# Patient Record
Sex: FEMALE | Race: OTHER | HISPANIC OR LATINO | Employment: UNEMPLOYED | URBAN - METROPOLITAN AREA
[De-identification: names, ages, dates, MRNs, and addresses within clinical notes are randomized per-mention and may not be internally consistent; named-entity substitution may affect disease eponyms.]

---

## 2023-03-28 ENCOUNTER — HOSPITAL ENCOUNTER (EMERGENCY)
Facility: HOSPITAL | Age: 83
Discharge: HOME | End: 2023-03-28
Attending: EMERGENCY MEDICINE

## 2023-03-28 ENCOUNTER — APPOINTMENT (EMERGENCY)
Dept: RADIOLOGY | Facility: HOSPITAL | Age: 83
End: 2023-03-28

## 2023-03-28 VITALS
HEART RATE: 85 BPM | OXYGEN SATURATION: 96 % | SYSTOLIC BLOOD PRESSURE: 174 MMHG | DIASTOLIC BLOOD PRESSURE: 74 MMHG | TEMPERATURE: 98.1 F | RESPIRATION RATE: 20 BRPM

## 2023-03-28 DIAGNOSIS — R05.3 CHRONIC COUGH: Primary | ICD-10-CM

## 2023-03-28 LAB
ANION GAP SERPL CALCULATED.3IONS-SCNC: 7 MMOL/L (ref 4–13)
BASOPHILS # BLD AUTO: 0.04 THOUSANDS/ÂΜL (ref 0–0.1)
BASOPHILS NFR BLD AUTO: 0 % (ref 0–1)
BNP SERPL-MCNC: 155 PG/ML (ref 0–100)
BUN SERPL-MCNC: 16 MG/DL (ref 5–25)
CALCIUM SERPL-MCNC: 8.7 MG/DL (ref 8.4–10.2)
CARDIAC TROPONIN I PNL SERPL HS: 39 NG/L
CHLORIDE SERPL-SCNC: 103 MMOL/L (ref 96–108)
CO2 SERPL-SCNC: 26 MMOL/L (ref 21–32)
CREAT SERPL-MCNC: 0.71 MG/DL (ref 0.6–1.3)
EOSINOPHIL # BLD AUTO: 0.41 THOUSAND/ÂΜL (ref 0–0.61)
EOSINOPHIL NFR BLD AUTO: 3 % (ref 0–6)
ERYTHROCYTE [DISTWIDTH] IN BLOOD BY AUTOMATED COUNT: 14.7 % (ref 11.6–15.1)
GFR SERPL CREATININE-BSD FRML MDRD: 79 ML/MIN/1.73SQ M
GLUCOSE SERPL-MCNC: 110 MG/DL (ref 65–140)
HCT VFR BLD AUTO: 36.8 % (ref 34.8–46.1)
HGB BLD-MCNC: 12.2 G/DL (ref 11.5–15.4)
IMM GRANULOCYTES # BLD AUTO: 0.12 THOUSAND/UL (ref 0–0.2)
IMM GRANULOCYTES NFR BLD AUTO: 1 % (ref 0–2)
LYMPHOCYTES # BLD AUTO: 2.71 THOUSANDS/ÂΜL (ref 0.6–4.47)
LYMPHOCYTES NFR BLD AUTO: 22 % (ref 14–44)
MCH RBC QN AUTO: 28.8 PG (ref 26.8–34.3)
MCHC RBC AUTO-ENTMCNC: 33.2 G/DL (ref 31.4–37.4)
MCV RBC AUTO: 87 FL (ref 82–98)
MONOCYTES # BLD AUTO: 0.96 THOUSAND/ÂΜL (ref 0.17–1.22)
MONOCYTES NFR BLD AUTO: 8 % (ref 4–12)
NEUTROPHILS # BLD AUTO: 8.12 THOUSANDS/ÂΜL (ref 1.85–7.62)
NEUTS SEG NFR BLD AUTO: 66 % (ref 43–75)
NRBC BLD AUTO-RTO: 0 /100 WBCS
PLATELET # BLD AUTO: 289 THOUSANDS/UL (ref 149–390)
PMV BLD AUTO: 9.6 FL (ref 8.9–12.7)
POTASSIUM SERPL-SCNC: 4.1 MMOL/L (ref 3.5–5.3)
RBC # BLD AUTO: 4.23 MILLION/UL (ref 3.81–5.12)
SODIUM SERPL-SCNC: 136 MMOL/L (ref 135–147)
WBC # BLD AUTO: 12.36 THOUSAND/UL (ref 4.31–10.16)

## 2023-03-28 RX ORDER — AMOXICILLIN 250 MG/1
500 CAPSULE ORAL ONCE
Status: COMPLETED | OUTPATIENT
Start: 2023-03-28 | End: 2023-03-28

## 2023-03-28 RX ORDER — ALBUTEROL SULFATE 90 UG/1
2 AEROSOL, METERED RESPIRATORY (INHALATION) EVERY 6 HOURS PRN
Qty: 8.5 G | Refills: 0 | Status: SHIPPED | OUTPATIENT
Start: 2023-03-28

## 2023-03-28 RX ORDER — IPRATROPIUM BROMIDE AND ALBUTEROL SULFATE 2.5; .5 MG/3ML; MG/3ML
3 SOLUTION RESPIRATORY (INHALATION) ONCE
Status: COMPLETED | OUTPATIENT
Start: 2023-03-28 | End: 2023-03-28

## 2023-03-28 RX ORDER — AMOXICILLIN 500 MG/1
500 CAPSULE ORAL 3 TIMES DAILY
Qty: 21 CAPSULE | Refills: 0 | Status: SHIPPED | OUTPATIENT
Start: 2023-03-28 | End: 2023-04-04

## 2023-03-28 RX ORDER — PREDNISONE 50 MG/1
50 TABLET ORAL DAILY
Qty: 5 TABLET | Refills: 0 | Status: SHIPPED | OUTPATIENT
Start: 2023-03-28

## 2023-03-28 RX ADMIN — AMOXICILLIN 500 MG: 250 CAPSULE ORAL at 20:55

## 2023-03-28 RX ADMIN — IPRATROPIUM BROMIDE AND ALBUTEROL SULFATE 3 ML: 2.5; .5 SOLUTION RESPIRATORY (INHALATION) at 20:55

## 2023-03-28 RX ADMIN — PREDNISONE 50 MG: 20 TABLET ORAL at 20:55

## 2023-03-28 NOTE — ED PROVIDER NOTES
History  Chief Complaint   Patient presents with   • Cough     Pt's family member states the pt has had a cough for 2 years with yellow sputum production      This is an 45-year-old female with no significant past medical history who does not smoke or drink  She lives with family members  She is brought to the emergency department for persistent cough that has been aggravating her for more than 2 years  No recent fevers  Some chills  The cough sounds wet and sometimes she complains of sore throat  History provided by:  Relative   used: Yes    Cough  Cough characteristics:  Productive  Associated symptoms: chest pain    Associated symptoms: no chills, no ear pain, no fever, no rash, no shortness of breath and no sore throat        None       History reviewed  No pertinent past medical history  History reviewed  No pertinent surgical history  History reviewed  No pertinent family history  I have reviewed and agree with the history as documented  E-Cigarette/Vaping     E-Cigarette/Vaping Substances     Social History     Tobacco Use   • Smoking status: Never   • Smokeless tobacco: Never       Review of Systems   Constitutional: Positive for fatigue  Negative for chills and fever  HENT: Negative for ear pain and sore throat  Eyes: Negative for pain and visual disturbance  Respiratory: Positive for cough and chest tightness  Negative for shortness of breath  Cardiovascular: Positive for chest pain  Negative for palpitations  Gastrointestinal: Negative for abdominal pain and vomiting  Genitourinary: Negative for dysuria and hematuria  Musculoskeletal: Negative for arthralgias and back pain  Skin: Negative for color change and rash  Neurological: Positive for weakness  Negative for seizures and syncope  All other systems reviewed and are negative  Physical Exam  Physical Exam  Vitals and nursing note reviewed     Constitutional:       General: She is not in acute distress  Appearance: Normal appearance  She is well-developed and normal weight  HENT:      Head: Normocephalic and atraumatic  Right Ear: External ear normal       Left Ear: External ear normal       Mouth/Throat:      Mouth: Mucous membranes are moist    Eyes:      Conjunctiva/sclera: Conjunctivae normal       Pupils: Pupils are equal, round, and reactive to light  Cardiovascular:      Rate and Rhythm: Normal rate and regular rhythm  Pulses: Normal pulses  Heart sounds: Normal heart sounds  No murmur heard  Pulmonary:      Effort: Pulmonary effort is normal  No respiratory distress  Breath sounds: Wheezing (With forced expiration) and rhonchi present  Chest:      Chest wall: No tenderness  Abdominal:      General: Abdomen is flat  Palpations: Abdomen is soft  Tenderness: There is no abdominal tenderness  Musculoskeletal:         General: No swelling  Cervical back: Neck supple  Skin:     General: Skin is warm and dry  Capillary Refill: Capillary refill takes less than 2 seconds  Neurological:      General: No focal deficit present  Mental Status: She is alert and oriented to person, place, and time  Psychiatric:         Mood and Affect: Mood normal          Thought Content:  Thought content normal          Judgment: Judgment normal          Vital Signs  ED Triage Vitals [03/28/23 1827]   Temperature Pulse Respirations Blood Pressure SpO2   98 1 °F (36 7 °C) 74 17 (!) 181/77 96 %      Temp src Heart Rate Source Patient Position - Orthostatic VS BP Location FiO2 (%)   -- Monitor -- -- --      Pain Score       --           Vitals:    03/28/23 1827 03/28/23 1915 03/28/23 2000 03/28/23 2100   BP: (!) 181/77  (!) 176/74 (!) 174/74   Pulse: 74 74 73 85         Visual Acuity      ED Medications  Medications   ipratropium-albuterol (DUO-NEB) 0 5-2 5 mg/3 mL inhalation solution 3 mL (3 mL Nebulization Given 3/28/23 2055)   predniSONE tablet 50 mg (50 mg Oral Given 3/28/23 2055)   amoxicillin (AMOXIL) capsule 500 mg (500 mg Oral Given 3/28/23 2055)       Diagnostic Studies  Results Reviewed     Procedure Component Value Units Date/Time    B-Type Natriuretic Peptide(BNP) [[id number]]  (Abnormal) Collected: 03/28/23 2002    Lab Status: Final result Specimen: Blood from Arm, Right Updated: 03/28/23 2039      pg/mL     HS Troponin 0hr (reflex protocol) [[id number]]  (Normal) Collected: 03/28/23 2002    Lab Status: Final result Specimen: Blood from Arm, Right Updated: 03/28/23 2035     hs TnI 0hr 39 ng/L     HS Troponin I 2hr [[id number]]     Lab Status: No result Specimen: Blood     HS Troponin I 4hr [[id number]]     Lab Status: No result Specimen: Blood     Basic metabolic panel [[id number]] Collected: 03/28/23 2002    Lab Status: Final result Specimen: Blood from Arm, Right Updated: 03/28/23 2024     Sodium 136 mmol/L      Potassium 4 1 mmol/L      Chloride 103 mmol/L      CO2 26 mmol/L      ANION GAP 7 mmol/L      BUN 16 mg/dL      Creatinine 0 71 mg/dL      Glucose 110 mg/dL      Calcium 8 7 mg/dL      eGFR 79 ml/min/1 73sq m     Narrative:      Rodri guidelines for Chronic Kidney Disease (CKD):   •  Stage 1 with normal or high GFR (GFR > 90 mL/min/1 73 square meters)  •  Stage 2 Mild CKD (GFR = 60-89 mL/min/1 73 square meters)  •  Stage 3A Moderate CKD (GFR = 45-59 mL/min/1 73 square meters)  •  Stage 3B Moderate CKD (GFR = 30-44 mL/min/1 73 square meters)  •  Stage 4 Severe CKD (GFR = 15-29 mL/min/1 73 square meters)  •  Stage 5 End Stage CKD (GFR <15 mL/min/1 73 square meters)  Note: GFR calculation is accurate only with a steady state creatinine    CBC and differential [[id number]]  (Abnormal) Collected: 03/28/23 2002    Lab Status: Final result Specimen: Blood from Arm, Right Updated: 03/28/23 2008     WBC 12 36 Thousand/uL      RBC 4 23 Million/uL      Hemoglobin 12 2 g/dL      Hematocrit 36 8 %      MCV 87 fL      MCH 28 8 pg MCHC 33 2 g/dL      RDW 14 7 %      MPV 9 6 fL      Platelets 786 Thousands/uL      nRBC 0 /100 WBCs      Neutrophils Relative 66 %      Immat GRANS % 1 %      Lymphocytes Relative 22 %      Monocytes Relative 8 %      Eosinophils Relative 3 %      Basophils Relative 0 %      Neutrophils Absolute 8 12 Thousands/µL      Immature Grans Absolute 0 12 Thousand/uL      Lymphocytes Absolute 2 71 Thousands/µL      Monocytes Absolute 0 96 Thousand/µL      Eosinophils Absolute 0 41 Thousand/µL      Basophils Absolute 0 04 Thousands/µL                  XR chest 2 views    (Results Pending)              Procedures  Procedures         ED Course  ED Course as of 03/28/23 2114   Tue Mar 28, 2023   2028 Sodium: 136   2028 Potassium: 4 1   2028 BUN: 16   2028 Creatinine: 0 71   2052 BNP(!): 155   2052 hs TnI 0hr: 44                                             Medical Decision Making  Differential diagnosis includes but not limited to: Bronchitis, chronic bronchitis, pneumonia, pleural effusion, congestive heart failure, pulmonary fibrosis, COPD    EKG shows normal sinus rhythm, left axis deviation  Rate of 84  Amount and/or Complexity of Data Reviewed  Labs: ordered  Decision-making details documented in ED Course  Radiology: ordered  Discussion of management or test interpretation with external provider(s): Patient is having no chest pain at this time  Risk  Prescription drug management  Disposition  Final diagnoses:   Chronic cough     Time reflects when diagnosis was documented in both MDM as applicable and the Disposition within this note     Time User Action Codes Description Comment    3/28/2023  9:06 PM Esther Hickey Add [R05 3] Chronic cough       ED Disposition     ED Disposition   Discharge    Condition   Stable    Date/Time   Tue Mar 28, 2023  9:06 PM    [address] discharge to home/self care                 Follow-up Information     Follow up With Specialties Details Why Contact Paty    Maria De Jesus Buck, [address] [facility] In 1 week  [address] [facility]  [phone]            Patient's Medications   Discharge Prescriptions    ALBUTEROL (PROAIR HFA) 90 MCG/ACT INHALER    Inhale 2 puffs every 6 (six) hours as needed for wheezing       Start Date: 3/28/2023 End Date: --       Order Dose: 2 puffs       Quantity: 8 5 g    Refills: 0    AMOXICILLIN (AMOXIL) 500 MG CAPSULE    Take 1 capsule (500 mg total) by mouth 3 (three) times a day for 7 days       Start Date: 3/28/2023 End Date: 4/4/2023       Order Dose: 500 mg       Quantity: 21 capsule    Refills: 0    PREDNISONE 50 MG TABLET    Take 1 tablet (50 mg total) by mouth daily       Start Date: 3/28/2023 End Date: --       Order Dose: 50 mg       Quantity: 5 tablet    Refills: 0       No discharge procedures on file      PDMP Review     None          ED Provider  Electronically Signed by           Jaida Aguilar MD  03/28/23 8528

## 2023-03-29 ENCOUNTER — OFFICE VISIT (OUTPATIENT)
Dept: FAMILY MEDICINE CLINIC | Facility: CLINIC | Age: 83
End: 2023-03-29

## 2023-03-29 VITALS
DIASTOLIC BLOOD PRESSURE: 78 MMHG | HEIGHT: 64 IN | SYSTOLIC BLOOD PRESSURE: 126 MMHG | HEART RATE: 95 BPM | OXYGEN SATURATION: 97 % | TEMPERATURE: 98.8 F

## 2023-03-29 DIAGNOSIS — Z13.6 SCREENING FOR CARDIOVASCULAR CONDITION: ICD-10-CM

## 2023-03-29 DIAGNOSIS — R05.2 SUBACUTE COUGH: Primary | ICD-10-CM

## 2023-03-29 DIAGNOSIS — J18.9 PNEUMONIA OF LEFT LUNG DUE TO INFECTIOUS ORGANISM, UNSPECIFIED PART OF LUNG: ICD-10-CM

## 2023-03-29 LAB
ATRIAL RATE: 84 BPM
P AXIS: -1 DEGREES
PR INTERVAL: 152 MS
QRS AXIS: -43 DEGREES
QRSD INTERVAL: 86 MS
QT INTERVAL: 358 MS
QTC INTERVAL: 423 MS
T WAVE AXIS: 33 DEGREES
VENTRICULAR RATE: 84 BPM

## 2023-03-29 RX ORDER — DEXTROMETHORPHAN HYDROBROMIDE AND PROMETHAZINE HYDROCHLORIDE 15; 6.25 MG/5ML; MG/5ML
5 SOLUTION ORAL 4 TIMES DAILY PRN
Qty: 240 ML | Refills: 0 | Status: SHIPPED | OUTPATIENT
Start: 2023-03-29

## 2023-03-29 RX ORDER — AZITHROMYCIN 250 MG/1
TABLET, FILM COATED ORAL
Qty: 6 TABLET | Refills: 0 | Status: SHIPPED | OUTPATIENT
Start: 2023-03-29 | End: 2023-04-03

## 2023-03-29 RX ORDER — BENZONATATE 200 MG/1
200 CAPSULE ORAL 3 TIMES DAILY PRN
Qty: 20 CAPSULE | Refills: 0 | Status: SHIPPED | OUTPATIENT
Start: 2023-03-29

## 2023-03-29 NOTE — PROGRESS NOTES
Assessment/Plan:         Problem List Items Addressed This Visit    None  Visit Diagnoses     Subacute cough    -  Primary    Will add mucinex, promethazine dm and tessalon pearls, also suggest saline nasal spray  Repeat xray in 1 month  Relevant Medications    Promethazine-DM (PHENERGAN-DM) 6 25-15 mg/5 mL oral syrup    benzonatate (TESSALON) 200 MG capsule    Pneumonia of left lung due to infectious organism, unspecified part of lung        Will add zpak, demonestrated use of inhaler, take all prescribed antibiotics, will return to [facility] in 2 weeks, follow up if not better  Relevant Medications    Promethazine-DM (PHENERGAN-DM) 6 25-15 mg/5 mL oral syrup    benzonatate (TESSALON) 200 MG capsule    azithromycin (Zithromax) 250 mg tablet    Screening for cardiovascular condition        Please have lab work and follow up in 1 month  Relevant Orders    HEMOGLOBIN A1C W/ EAG ESTIMATION    Lipid panel            Subjective:      Patient ID: Yohannes Clark is a 80 y o  female  Laura Kyle is here to establish, she was seen in the ER for pneumonia  She only took 1 dose of the antibiotics  She is from [facility] and visits her son here every 3-4 months  She is on no medicine in [facility]  The following portions of the patient's history were reviewed and updated as appropriate:   Past Medical History:  She has no past medical history on file ,  _______________________________________________________________________  Medical Problems:  does not have a problem list on file ,  _______________________________________________________________________  Past Surgical History:   has no past surgical history on file ,  _______________________________________________________________________  Family History:  family history is not on file ,  _______________________________________________________________________  Social History:   reports that she has never smoked  She has never been exposed to tobacco smoke  She has never used smokeless tobacco  She reports that she does not currently use alcohol  She reports that she does not use drugs  ,  _______________________________________________________________________  Allergies:  has No Known Allergies     _______________________________________________________________________  Current Outpatient Medications   Medication Sig Dispense Refill   • azithromycin (Zithromax) 250 mg tablet Take 2 tablets (500 mg total) by mouth daily for 1 day, THEN 1 tablet (250 mg total) daily for 4 days  6 tablet 0   • benzonatate (TESSALON) 200 MG capsule Take 1 capsule (200 mg total) by mouth 3 (three) times a day as needed for cough 20 capsule 0   • Promethazine-DM (PHENERGAN-DM) 6 25-15 mg/5 mL oral syrup Take 5 mL by mouth 4 (four) times a day as needed for cough 240 mL 0   • albuterol (ProAir HFA) 90 mcg/act inhaler Inhale 2 puffs every 6 (six) hours as needed for wheezing 8 5 g 0   • amoxicillin (AMOXIL) 500 mg capsule Take 1 capsule (500 mg total) by mouth 3 (three) times a day for 7 days 21 capsule 0   • predniSONE 50 mg tablet Take 1 tablet (50 mg total) by mouth daily 5 tablet 0     No current facility-administered medications for this visit      _______________________________________________________________________  Review of Systems   Constitutional: Positive for fatigue  Negative for chills, diaphoresis and fever  HENT: Positive for congestion  Negative for ear pain, rhinorrhea, sinus pressure, sinus pain and sore throat  Eyes: Negative for pain and visual disturbance  Respiratory: Positive for cough, chest tightness and wheezing  Negative for shortness of breath  Cardiovascular: Positive for chest pain  Negative for palpitations  Gastrointestinal: Negative for abdominal pain, constipation, diarrhea, nausea and vomiting  Genitourinary: Negative for dysuria, frequency, hematuria and urgency  Musculoskeletal: Negative for arthralgias, back pain and myalgias     Skin: "Negative for color change and rash  Neurological: Positive for dizziness  Negative for seizures, syncope and headaches  Psychiatric/Behavioral: Positive for sleep disturbance  All other systems reviewed and are negative  Objective:  Vitals:    03/29/23 1407   BP: 126/78   Pulse: 95   Temp: 98 8 °F (37 1 °C)   SpO2: 97%   Height: 5' 4\" (1 626 m)     There is no height or weight on file to calculate BMI  Physical Exam  Vitals and nursing note reviewed  Constitutional:       Appearance: Normal appearance  She is not ill-appearing  HENT:      Head: Normocephalic  Right Ear: Tympanic membrane, ear canal and external ear normal  There is no impacted cerumen  Left Ear: Tympanic membrane, ear canal and external ear normal  There is no impacted cerumen  Nose: Congestion present  Mouth/Throat:      Mouth: Mucous membranes are moist       Pharynx: No posterior oropharyngeal erythema  Eyes:      Extraocular Movements: Extraocular movements intact  Conjunctiva/sclera: Conjunctivae normal       Pupils: Pupils are equal, round, and reactive to light  Cardiovascular:      Rate and Rhythm: Normal rate and regular rhythm  Heart sounds: No murmur heard  Pulmonary:      Effort: Pulmonary effort is normal       Breath sounds: Wheezing and rales present  Abdominal:      General: Bowel sounds are normal       Palpations: Abdomen is soft  Tenderness: There is no abdominal tenderness  Musculoskeletal:         General: Normal range of motion  Cervical back: Normal range of motion  Right lower leg: No edema  Left lower leg: No edema  Skin:     General: Skin is warm and dry  Neurological:      General: No focal deficit present  Mental Status: She is alert     Psychiatric:         Mood and Affect: Mood normal          Behavior: Behavior normal          "

## 2023-03-29 NOTE — DISCHARGE INSTRUCTIONS
A  personal message from Dr Sushma Prasad,  Thank you so much for allowing me to care for you today  I pride myself in the care and attention I give all my patients  I hope you were a witness to this tonight  If for any reason your condition does not improve, worsens, or you have a question that was not answered during your visit you can feel free to text me on my personal phone   # [phone]  I will answer to your message and continue your care past your emergency room visit

## 2023-04-11 LAB — HBA1C MFR BLD HPLC: 6.1 %

## 2023-04-17 DIAGNOSIS — R05.2 SUBACUTE COUGH: ICD-10-CM

## 2023-05-05 ENCOUNTER — OFFICE VISIT (OUTPATIENT)
Dept: FAMILY MEDICINE CLINIC | Facility: CLINIC | Age: 83
End: 2023-05-05

## 2023-05-05 VITALS
HEIGHT: 64 IN | HEART RATE: 71 BPM | OXYGEN SATURATION: 97 % | DIASTOLIC BLOOD PRESSURE: 84 MMHG | SYSTOLIC BLOOD PRESSURE: 134 MMHG | TEMPERATURE: 97.8 F

## 2023-05-05 DIAGNOSIS — Z13.6 SCREENING FOR CARDIOVASCULAR CONDITION: Primary | ICD-10-CM

## 2023-05-05 DIAGNOSIS — E28.39 OVARIAN FAILURE DUE TO MENOPAUSE: ICD-10-CM

## 2023-05-05 DIAGNOSIS — M25.561 CHRONIC PAIN OF BOTH KNEES: ICD-10-CM

## 2023-05-05 DIAGNOSIS — M25.562 CHRONIC PAIN OF BOTH KNEES: ICD-10-CM

## 2023-05-05 DIAGNOSIS — G89.29 CHRONIC PAIN OF BOTH KNEES: ICD-10-CM

## 2023-05-05 RX ORDER — MELOXICAM 15 MG/1
15 TABLET ORAL DAILY
Qty: 90 TABLET | Refills: 1 | Status: SHIPPED | OUTPATIENT
Start: 2023-05-05

## 2023-05-05 NOTE — PROGRESS NOTES
Assessment/Plan:         Problem List Items Addressed This Visit        Other    Chronic pain of both knees     Given instructions to start meloxicam once daily and tylenol arthritis twice daily and glucosamine chondroitin and tumeric  Follow up in 3 months when returned from [facility]  Relevant Medications    meloxicam (Mobic) 15 mg tablet   Other Visit Diagnoses     Screening for cardiovascular condition    -  Primary    lab work is ordered, if when she returns she still has no insurance will do in office A1C    Relevant Orders    Lipid panel    Hemoglobin A1C    TSH, 3rd generation with Free T4 reflex    CBC and Platelet    Comprehensive metabolic panel    Ovarian failure due to menopause        Dexa ordered, will wait to have until insurance is obtained  Relevant Orders    DXA bone density spine hip and pelvis            Subjective:      Patient ID: Guillermo Alex is a 80 y o  female  Sophia Mercury is here for bilateral knee pain, her breathing is improved  She will be going to [facility] on May 5/15/23 for 2 months and will return after  She still does not have insurance  The following portions of the patient's history were reviewed and updated as appropriate:   Past Medical History:  She has no past medical history on file ,  _______________________________________________________________________  Medical Problems:  does not have any pertinent problems on file ,  _______________________________________________________________________  Past Surgical History:   has no past surgical history on file ,  _______________________________________________________________________  Family History:  family history is not on file ,  _______________________________________________________________________  Social History:   reports that she has never smoked  She has never been exposed to tobacco smoke  She has never used smokeless tobacco  She reports that she does not currently use alcohol   She reports that she does not use drugs  ,  _______________________________________________________________________  Allergies:  has No Known Allergies     _______________________________________________________________________  Current Outpatient Medications   Medication Sig Dispense Refill    albuterol (ProAir HFA) 90 mcg/act inhaler Inhale 2 puffs every 6 (six) hours as needed for wheezing 8 5 g 0    benzonatate (TESSALON) 200 MG capsule Take 1 capsule (200 mg total) by mouth 3 (three) times a day as needed for cough 20 capsule 0    meloxicam (Mobic) 15 mg tablet Take 1 tablet (15 mg total) by mouth daily 90 tablet 1    predniSONE 50 mg tablet Take 1 tablet (50 mg total) by mouth daily 5 tablet 0    Promethazine-DM (PHENERGAN-DM) 6 25-15 mg/5 mL oral syrup Take 5 mL by mouth 4 (four) times a day as needed for cough 240 mL 0     No current facility-administered medications for this visit      _______________________________________________________________________  Review of Systems   Constitutional: Negative for chills, fatigue and fever  HENT: Negative for congestion, ear pain, rhinorrhea, sinus pressure, sinus pain and sore throat  Eyes: Negative for pain and visual disturbance  Respiratory: Negative for cough, chest tightness and shortness of breath  Cardiovascular: Negative for chest pain and palpitations  Gastrointestinal: Negative for abdominal pain, constipation, diarrhea, nausea and vomiting  Genitourinary: Negative for dysuria and hematuria  Musculoskeletal: Positive for arthralgias  Negative for back pain  Skin: Negative for color change and rash  Neurological: Negative for dizziness, seizures, syncope, light-headedness and headaches  Psychiatric/Behavioral: Negative for dysphoric mood  The patient is not nervous/anxious  All other systems reviewed and are negative          Objective:  Vitals:    05/05/23 0928   BP: 134/84   BP Location: Left arm   Patient Position: Sitting   Cuff Size: "Standard   Pulse: 71   Temp: 97 8 °F (36 6 °C)   TempSrc: Tympanic   SpO2: 97%   Height: 5' 4\" (1 626 m)     There is no height or weight on file to calculate BMI  Physical Exam  Vitals and nursing note reviewed  Constitutional:       General: She is not in acute distress  Appearance: Normal appearance  She is not ill-appearing  HENT:      Head: Normocephalic  Right Ear: External ear normal  There is impacted cerumen  Left Ear: Tympanic membrane, ear canal and external ear normal  There is no impacted cerumen  Nose: Nose normal  No congestion  Mouth/Throat:      Mouth: Mucous membranes are moist       Pharynx: No posterior oropharyngeal erythema  Eyes:      Extraocular Movements: Extraocular movements intact  Conjunctiva/sclera: Conjunctivae normal       Pupils: Pupils are equal, round, and reactive to light  Cardiovascular:      Rate and Rhythm: Normal rate and regular rhythm  Heart sounds: Normal heart sounds  No murmur heard  Pulmonary:      Effort: Pulmonary effort is normal       Breath sounds: Rhonchi present  No wheezing  Abdominal:      General: Bowel sounds are normal       Palpations: Abdomen is soft  Tenderness: There is no abdominal tenderness  Musculoskeletal:         General: Normal range of motion  Cervical back: Normal range of motion  Right lower leg: No edema  Left lower leg: No edema  Skin:     General: Skin is warm and dry  Neurological:      General: No focal deficit present  Mental Status: She is alert     Psychiatric:         Mood and Affect: Mood normal          Behavior: Behavior normal          "

## 2023-05-05 NOTE — ASSESSMENT & PLAN NOTE
Given instructions to start meloxicam once daily and tylenol arthritis twice daily and glucosamine chondroitin and tumeric  Follow up in 3 months when returned from [facility]

## 2023-12-28 ENCOUNTER — OFFICE VISIT (OUTPATIENT)
Dept: FAMILY MEDICINE CLINIC | Facility: CLINIC | Age: 83
End: 2023-12-28

## 2023-12-28 VITALS — HEIGHT: 64 IN | HEART RATE: 63 BPM | TEMPERATURE: 97.2 F | OXYGEN SATURATION: 97 %

## 2023-12-28 DIAGNOSIS — J06.9 UPPER RESPIRATORY TRACT INFECTION, UNSPECIFIED TYPE: Primary | ICD-10-CM

## 2023-12-28 LAB
SARS-COV-2 AG UPPER RESP QL IA: NEGATIVE
VALID CONTROL: NORMAL

## 2023-12-28 PROCEDURE — 99214 OFFICE O/P EST MOD 30 MIN: CPT

## 2023-12-28 PROCEDURE — 87811 SARS-COV-2 COVID19 W/OPTIC: CPT

## 2023-12-28 RX ORDER — FLUTICASONE PROPIONATE 50 MCG
1 SPRAY, SUSPENSION (ML) NASAL DAILY
Qty: 15.8 ML | Refills: 1 | Status: SHIPPED | OUTPATIENT
Start: 2023-12-28

## 2023-12-28 RX ORDER — MELOXICAM 15 MG/1
15 TABLET ORAL DAILY
Qty: 60 TABLET | Refills: 0 | Status: SHIPPED | OUTPATIENT
Start: 2023-12-28

## 2023-12-28 RX ORDER — PREDNISONE 10 MG/1
TABLET ORAL DAILY
Qty: 30 TABLET | Refills: 0 | Status: SHIPPED | OUTPATIENT
Start: 2023-12-28 | End: 2024-01-06

## 2023-12-28 RX ORDER — DEXTROMETHORPHAN HYDROBROMIDE AND PROMETHAZINE HYDROCHLORIDE 15; 6.25 MG/5ML; MG/5ML
5 SYRUP ORAL 4 TIMES DAILY PRN
Qty: 473 ML | Refills: 1 | Status: SHIPPED | OUTPATIENT
Start: 2023-12-28

## 2023-12-28 RX ORDER — AMOXICILLIN AND CLAVULANATE POTASSIUM 875; 125 MG/1; MG/1
1 TABLET, FILM COATED ORAL EVERY 12 HOURS SCHEDULED
Qty: 20 TABLET | Refills: 0 | Status: SHIPPED | OUTPATIENT
Start: 2023-12-28 | End: 2024-01-07

## 2023-12-28 RX ORDER — ALBUTEROL SULFATE 90 UG/1
2 AEROSOL, METERED RESPIRATORY (INHALATION) EVERY 6 HOURS PRN
Qty: 18 G | Refills: 5 | Status: SHIPPED | OUTPATIENT
Start: 2023-12-28

## 2023-12-28 NOTE — PATIENT INSTRUCTIONS
Problem List Items Addressed This Visit    None  Visit Diagnoses       Upper respiratory tract infection, unspecified type    -  Primary    Will treat with augmentin and steroid burst, promethazine as needed. flonase and inhaler.    Relevant Medications    amoxicillin-clavulanate (AUGMENTIN) 875-125 mg per tablet    predniSONE 10 mg tablet    fluticasone (FLONASE) 50 mcg/act nasal spray    promethazine-dextromethorphan (PHENERGAN-DM) 6.25-15 mg/5 mL oral syrup

## 2023-12-28 NOTE — PROGRESS NOTES
Assessment/Plan:         Problem List Items Addressed This Visit    None  Visit Diagnoses     Upper respiratory tract infection, unspecified type    -  Primary    Will treat with augmentin and steroid burst, promethazine as needed. flonase and inhaler.    Relevant Medications    amoxicillin-clavulanate (AUGMENTIN) 875-125 mg per tablet    predniSONE 10 mg tablet    fluticasone (FLONASE) 50 mcg/act nasal spray    promethazine-dextromethorphan (PHENERGAN-DM) 6.25-15 mg/5 mL oral syrup    albuterol (Ventolin HFA) 90 mcg/act inhaler    meloxicam (Mobic) 15 mg tablet    Other Relevant Orders    POCT Rapid Covid Ag (Completed)              Subjective:      Patient ID: Bart Jasso is a 83 y.o. female.    Bart is here for a cough for 8 days. Bart came to the [place] from [facility] on the 19th of December.     Cough  Associated symptoms include chest pain, chills, headaches, shortness of breath and wheezing. Pertinent negatives include no ear pain, fever, postnasal drip, rash, rhinorrhea or sore throat.       The following portions of the patient's history were reviewed and updated as appropriate:   Past Medical History:  She has no past medical history on file.,  _______________________________________________________________________  Medical Problems:  does not have any pertinent problems on file.,  _______________________________________________________________________  Past Surgical History:   has no past surgical history on file.,  _______________________________________________________________________  Family History:  family history is not on file.,  _______________________________________________________________________  Social History:   reports that she has never smoked. She has never been exposed to tobacco smoke. She has never used smokeless tobacco. She reports that she does not currently use alcohol. She reports that she does not use  drugs.,  _______________________________________________________________________  Allergies:  has No Known Allergies..  _______________________________________________________________________  Current Outpatient Medications   Medication Sig Dispense Refill   • albuterol (Ventolin HFA) 90 mcg/act inhaler Inhale 2 puffs every 6 (six) hours as needed for wheezing 18 g 5   • amoxicillin-clavulanate (AUGMENTIN) 875-125 mg per tablet Take 1 tablet by mouth every 12 (twelve) hours for 10 days 20 tablet 0   • fluticasone (FLONASE) 50 mcg/act nasal spray 1 spray into each nostril daily 15.8 mL 1   • meloxicam (Mobic) 15 mg tablet Take 1 tablet (15 mg total) by mouth daily 60 tablet 0   • predniSONE 10 mg tablet Take 5 tablets (50 mg total) by mouth daily for 2 days, THEN 4 tablets (40 mg total) daily for 2 days, THEN 3 tablets (30 mg total) daily for 2 days, THEN 2 tablets (20 mg total) daily for 2 days, THEN 1 tablet (10 mg total) daily for 2 days. 30 tablet 0   • promethazine-dextromethorphan (PHENERGAN-DM) 6.25-15 mg/5 mL oral syrup Take 5 mL by mouth 4 (four) times a day as needed for cough 473 mL 1     No current facility-administered medications for this visit.     _______________________________________________________________________  Review of Systems   Constitutional:  Positive for chills and fatigue. Negative for fever.   HENT:  Negative for congestion, ear pain, postnasal drip, rhinorrhea, sinus pressure, sinus pain and sore throat.    Eyes:  Negative for pain and visual disturbance.   Respiratory:  Positive for cough, chest tightness, shortness of breath and wheezing.    Cardiovascular:  Positive for chest pain. Negative for palpitations.   Gastrointestinal:  Negative for abdominal pain, constipation, diarrhea, nausea and vomiting.   Genitourinary:  Negative for dysuria, frequency, hematuria and urgency.   Musculoskeletal:  Positive for arthralgias and back pain.   Skin:  Negative for color change and rash.  "  Neurological:  Positive for headaches. Negative for seizures and syncope.   All other systems reviewed and are negative.        Objective:  Vitals:    12/28/23 1121   Pulse: 63   Temp: (!) 97.2 °F (36.2 °C)   SpO2: 97%   Height: 5' 4\" (1.626 m)     There is no height or weight on file to calculate BMI.     Physical Exam  Vitals and nursing note reviewed.   Constitutional:       Appearance: Normal appearance. She is not ill-appearing.   HENT:      Head: Normocephalic.      Right Ear: Tympanic membrane, ear canal and external ear normal. There is no impacted cerumen.      Left Ear: Tympanic membrane, ear canal and external ear normal. There is no impacted cerumen.      Nose: Nose normal. No congestion.      Mouth/Throat:      Mouth: Mucous membranes are moist.      Pharynx: No posterior oropharyngeal erythema.   Eyes:      Extraocular Movements: Extraocular movements intact.      Conjunctiva/sclera: Conjunctivae normal.      Pupils: Pupils are equal, round, and reactive to light.   Cardiovascular:      Rate and Rhythm: Normal rate and regular rhythm.      Heart sounds: Normal heart sounds. No murmur heard.  Pulmonary:      Effort: Pulmonary effort is normal.      Breath sounds: Wheezing present.   Abdominal:      Palpations: Abdomen is soft.      Tenderness: There is no abdominal tenderness.   Musculoskeletal:         General: Normal range of motion.      Cervical back: Normal range of motion.      Right lower leg: No edema.      Left lower leg: No edema.   Skin:     General: Skin is warm and dry.   Neurological:      General: No focal deficit present.      Mental Status: She is alert.   Psychiatric:         Mood and Affect: Mood normal.         Behavior: Behavior normal.         "

## 2025-01-07 ENCOUNTER — APPOINTMENT (EMERGENCY)
Dept: RADIOLOGY | Facility: HOSPITAL | Age: 85
DRG: 660 | End: 2025-01-07
Payer: COMMERCIAL

## 2025-01-07 ENCOUNTER — HOSPITAL ENCOUNTER (INPATIENT)
Facility: HOSPITAL | Age: 85
LOS: 8 days | Discharge: HOME | DRG: 660 | End: 2025-01-16
Attending: EMERGENCY MEDICINE | Admitting: STUDENT IN AN ORGANIZED HEALTH CARE EDUCATION/TRAINING PROGRAM
Payer: COMMERCIAL

## 2025-01-07 ENCOUNTER — OFFICE VISIT (OUTPATIENT)
Dept: FAMILY MEDICINE CLINIC | Facility: CLINIC | Age: 85
End: 2025-01-07

## 2025-01-07 ENCOUNTER — APPOINTMENT (EMERGENCY)
Dept: CT IMAGING | Facility: HOSPITAL | Age: 85
DRG: 660 | End: 2025-01-07
Payer: COMMERCIAL

## 2025-01-07 VITALS
BODY MASS INDEX: 22.81 KG/M2 | HEART RATE: 73 BPM | WEIGHT: 133.6 LBS | HEIGHT: 64 IN | SYSTOLIC BLOOD PRESSURE: 120 MMHG | DIASTOLIC BLOOD PRESSURE: 70 MMHG | OXYGEN SATURATION: 99 %

## 2025-01-07 DIAGNOSIS — R09.89 BILATERAL RALES: ICD-10-CM

## 2025-01-07 DIAGNOSIS — I49.9 CARDIAC ARRHYTHMIA, UNSPECIFIED CARDIAC ARRHYTHMIA TYPE: Primary | ICD-10-CM

## 2025-01-07 DIAGNOSIS — R13.10 ODYNOPHAGIA: ICD-10-CM

## 2025-01-07 DIAGNOSIS — I48.0 PAF (PAROXYSMAL ATRIAL FIBRILLATION) (HCC): ICD-10-CM

## 2025-01-07 DIAGNOSIS — R79.89 ELEVATED TROPONIN: ICD-10-CM

## 2025-01-07 DIAGNOSIS — I48.20 CHRONIC ATRIAL FIBRILLATION (HCC): ICD-10-CM

## 2025-01-07 DIAGNOSIS — K13.79 MOUTH SORES: ICD-10-CM

## 2025-01-07 DIAGNOSIS — I48.0 PAROXYSMAL ATRIAL FIBRILLATION (HCC): ICD-10-CM

## 2025-01-07 DIAGNOSIS — D69.6 THROMBOCYTOPENIA (HCC): ICD-10-CM

## 2025-01-07 DIAGNOSIS — K13.79 MOUTH PAIN: ICD-10-CM

## 2025-01-07 DIAGNOSIS — K13.79 MOUTH SORES: Primary | ICD-10-CM

## 2025-01-07 DIAGNOSIS — D61.818 PANCYTOPENIA (HCC): ICD-10-CM

## 2025-01-07 LAB
2HR DELTA HS TROPONIN: 16 NG/L
4HR DELTA HS TROPONIN: 54 NG/L
ALBUMIN SERPL BCG-MCNC: 3.7 G/DL (ref 3.5–5)
ALP SERPL-CCNC: 68 U/L (ref 34–104)
ALT SERPL W P-5'-P-CCNC: 14 U/L (ref 7–52)
ANION GAP SERPL CALCULATED.3IONS-SCNC: 7 MMOL/L (ref 4–13)
APTT PPP: 29 SECONDS (ref 23–34)
AST SERPL W P-5'-P-CCNC: 12 U/L (ref 13–39)
ATRIAL RATE: 74 BPM
BACTERIA UR QL AUTO: ABNORMAL /HPF
BASOPHILS # BLD AUTO: 0 THOUSANDS/ΜL (ref 0–0.1)
BASOPHILS NFR BLD AUTO: 0 % (ref 0–1)
BILIRUB SERPL-MCNC: 0.89 MG/DL (ref 0.2–1)
BILIRUB UR QL STRIP: NEGATIVE
BNP SERPL-MCNC: 372 PG/ML (ref 0–100)
BUN SERPL-MCNC: 26 MG/DL (ref 5–25)
CALCIUM SERPL-MCNC: 8.7 MG/DL (ref 8.4–10.2)
CARDIAC TROPONIN I PNL SERPL HS: 120 NG/L (ref ?–50)
CARDIAC TROPONIN I PNL SERPL HS: 136 NG/L (ref ?–50)
CARDIAC TROPONIN I PNL SERPL HS: 174 NG/L (ref ?–50)
CHLORIDE SERPL-SCNC: 107 MMOL/L (ref 96–108)
CLARITY UR: CLEAR
CO2 SERPL-SCNC: 26 MMOL/L (ref 21–32)
COLOR UR: YELLOW
CREAT SERPL-MCNC: 1.14 MG/DL (ref 0.6–1.3)
EOSINOPHIL # BLD AUTO: 0.03 THOUSAND/ΜL (ref 0–0.61)
EOSINOPHIL NFR BLD AUTO: 1 % (ref 0–6)
ERYTHROCYTE [DISTWIDTH] IN BLOOD BY AUTOMATED COUNT: 13.8 % (ref 11.6–15.1)
GFR SERPL CREATININE-BSD FRML MDRD: 44 ML/MIN/1.73SQ M
GLUCOSE SERPL-MCNC: 114 MG/DL (ref 65–140)
GLUCOSE UR STRIP-MCNC: NEGATIVE MG/DL
HCT VFR BLD AUTO: 35.1 % (ref 34.8–46.1)
HGB BLD-MCNC: 11.4 G/DL (ref 11.5–15.4)
HGB UR QL STRIP.AUTO: ABNORMAL
IMM GRANULOCYTES # BLD AUTO: 0.02 THOUSAND/UL (ref 0–0.2)
IMM GRANULOCYTES NFR BLD AUTO: 1 % (ref 0–2)
INR PPP: 1.13 (ref 0.85–1.19)
KETONES UR STRIP-MCNC: ABNORMAL MG/DL
LACTATE SERPL-SCNC: 1.1 MMOL/L (ref 0.5–2)
LDH SERPL-CCNC: 128 U/L (ref 140–271)
LEUKOCYTE ESTERASE UR QL STRIP: NEGATIVE
LG PLATELETS BLD QL SMEAR: PRESENT
LYMPHOCYTES # BLD AUTO: 1.74 THOUSANDS/ΜL (ref 0.6–4.47)
LYMPHOCYTES NFR BLD AUTO: 54 % (ref 14–44)
MCH RBC QN AUTO: 28.4 PG (ref 26.8–34.3)
MCHC RBC AUTO-ENTMCNC: 32.5 G/DL (ref 31.4–37.4)
MCV RBC AUTO: 88 FL (ref 82–98)
MONOCYTES # BLD AUTO: 0.02 THOUSAND/ΜL (ref 0.17–1.22)
MONOCYTES NFR BLD AUTO: 1 % (ref 4–12)
MUCOUS THREADS UR QL AUTO: ABNORMAL
NEUTROPHILS # BLD AUTO: 1.38 THOUSANDS/ΜL (ref 1.85–7.62)
NEUTS SEG NFR BLD AUTO: 43 % (ref 43–75)
NITRITE UR QL STRIP: NEGATIVE
NON-SQ EPI CELLS URNS QL MICRO: ABNORMAL /HPF
NRBC BLD AUTO-RTO: 0 /100 WBCS
P AXIS: 61 DEGREES
PATHOLOGY REVIEW: NO
PH UR STRIP.AUTO: 6 [PH]
PLATELET # BLD AUTO: 33 THOUSANDS/UL (ref 149–390)
PLATELET BLD QL SMEAR: ABNORMAL
PMV BLD AUTO: 11.1 FL (ref 8.9–12.7)
POTASSIUM SERPL-SCNC: 4.4 MMOL/L (ref 3.5–5.3)
PR INTERVAL: 166 MS
PROCALCITONIN SERPL-MCNC: <0.05 NG/ML
PROT SERPL-MCNC: 6.1 G/DL (ref 6.4–8.4)
PROT UR STRIP-MCNC: ABNORMAL MG/DL
PROTHROMBIN TIME: 15.2 SECONDS (ref 12.3–15)
QRS AXIS: -39 DEGREES
QRSD INTERVAL: 80 MS
QT INTERVAL: 344 MS
QTC INTERVAL: 381 MS
RBC # BLD AUTO: 4.01 MILLION/UL (ref 3.81–5.12)
RBC #/AREA URNS AUTO: ABNORMAL /HPF
RBC MORPH BLD: NORMAL
RETICS # AUTO: ABNORMAL 10*3/UL (ref 14097–95744)
RETICS # CALC: 0.35 % (ref 0.37–1.87)
SODIUM SERPL-SCNC: 140 MMOL/L (ref 135–147)
SP GR UR STRIP.AUTO: >=1.05 (ref 1–1.03)
T WAVE AXIS: 61 DEGREES
UROBILINOGEN UR STRIP-ACNC: <2 MG/DL
VENTRICULAR RATE: 74 BPM
WBC # BLD AUTO: 3.19 THOUSAND/UL (ref 4.31–10.16)
WBC #/AREA URNS AUTO: ABNORMAL /HPF

## 2025-01-07 PROCEDURE — 99285 EMERGENCY DEPT VISIT HI MDM: CPT | Performed by: EMERGENCY MEDICINE

## 2025-01-07 PROCEDURE — 99284 EMERGENCY DEPT VISIT MOD MDM: CPT

## 2025-01-07 PROCEDURE — 84145 PROCALCITONIN (PCT): CPT | Performed by: EMERGENCY MEDICINE

## 2025-01-07 PROCEDURE — 84484 ASSAY OF TROPONIN QUANT: CPT | Performed by: EMERGENCY MEDICINE

## 2025-01-07 PROCEDURE — 93005 ELECTROCARDIOGRAM TRACING: CPT

## 2025-01-07 PROCEDURE — 85610 PROTHROMBIN TIME: CPT | Performed by: EMERGENCY MEDICINE

## 2025-01-07 PROCEDURE — 84484 ASSAY OF TROPONIN QUANT: CPT | Performed by: INTERNAL MEDICINE

## 2025-01-07 PROCEDURE — 83880 ASSAY OF NATRIURETIC PEPTIDE: CPT | Performed by: EMERGENCY MEDICINE

## 2025-01-07 PROCEDURE — 87040 BLOOD CULTURE FOR BACTERIA: CPT | Performed by: EMERGENCY MEDICINE

## 2025-01-07 PROCEDURE — 71046 X-RAY EXAM CHEST 2 VIEWS: CPT

## 2025-01-07 PROCEDURE — 80053 COMPREHEN METABOLIC PANEL: CPT | Performed by: EMERGENCY MEDICINE

## 2025-01-07 PROCEDURE — 85045 AUTOMATED RETICULOCYTE COUNT: CPT | Performed by: INTERNAL MEDICINE

## 2025-01-07 PROCEDURE — 85730 THROMBOPLASTIN TIME PARTIAL: CPT | Performed by: EMERGENCY MEDICINE

## 2025-01-07 PROCEDURE — 99215 OFFICE O/P EST HI 40 MIN: CPT

## 2025-01-07 PROCEDURE — 85025 COMPLETE CBC W/AUTO DIFF WBC: CPT | Performed by: EMERGENCY MEDICINE

## 2025-01-07 PROCEDURE — 71275 CT ANGIOGRAPHY CHEST: CPT

## 2025-01-07 PROCEDURE — 83605 ASSAY OF LACTIC ACID: CPT | Performed by: EMERGENCY MEDICINE

## 2025-01-07 PROCEDURE — 83615 LACTATE (LD) (LDH) ENZYME: CPT | Performed by: INTERNAL MEDICINE

## 2025-01-07 PROCEDURE — 81001 URINALYSIS AUTO W/SCOPE: CPT | Performed by: INTERNAL MEDICINE

## 2025-01-07 PROCEDURE — 36415 COLL VENOUS BLD VENIPUNCTURE: CPT | Performed by: EMERGENCY MEDICINE

## 2025-01-07 PROCEDURE — 87070 CULTURE OTHR SPECIMN AEROBIC: CPT

## 2025-01-07 PROCEDURE — 99222 1ST HOSP IP/OBS MODERATE 55: CPT | Performed by: INTERNAL MEDICINE

## 2025-01-07 RX ORDER — LIDOCAINE HYDROCHLORIDE 20 MG/ML
15 SOLUTION OROPHARYNGEAL 4 TIMES DAILY PRN
Qty: 100 ML | Refills: 0 | Status: ON HOLD | OUTPATIENT
Start: 2025-01-07 | End: 2025-01-16

## 2025-01-07 RX ORDER — DIPHENHYDRAMINE HYDROCHLORIDE AND LIDOCAINE HYDROCHLORIDE AND ALUMINUM HYDROXIDE AND MAGNESIUM HYDRO
10 KIT EVERY 4 HOURS PRN
Status: DISCONTINUED | OUTPATIENT
Start: 2025-01-07 | End: 2025-01-12

## 2025-01-07 RX ORDER — ASPIRIN 81 MG/1
324 TABLET, CHEWABLE ORAL ONCE
Status: COMPLETED | OUTPATIENT
Start: 2025-01-07 | End: 2025-01-07

## 2025-01-07 RX ORDER — HEPARIN SODIUM 5000 [USP'U]/ML
5000 INJECTION, SOLUTION INTRAVENOUS; SUBCUTANEOUS EVERY 8 HOURS SCHEDULED
Status: DISCONTINUED | OUTPATIENT
Start: 2025-01-08 | End: 2025-01-07 | Stop reason: CLARIF

## 2025-01-07 RX ORDER — ACETAMINOPHEN 325 MG/1
650 TABLET ORAL EVERY 6 HOURS PRN
Status: DISCONTINUED | OUTPATIENT
Start: 2025-01-07 | End: 2025-01-09

## 2025-01-07 RX ORDER — AMOXICILLIN 400 MG/5ML
875 POWDER, FOR SUSPENSION ORAL 2 TIMES DAILY
Qty: 220 ML | Refills: 0 | Status: SHIPPED | OUTPATIENT
Start: 2025-01-07 | End: 2025-01-16

## 2025-01-07 RX ORDER — ALBUTEROL SULFATE 90 UG/1
2 INHALANT RESPIRATORY (INHALATION) EVERY 6 HOURS PRN
Status: DISCONTINUED | OUTPATIENT
Start: 2025-01-07 | End: 2025-01-16 | Stop reason: HOSPADM

## 2025-01-07 RX ORDER — FLUTICASONE PROPIONATE 50 MCG
1 SPRAY, SUSPENSION (ML) NASAL DAILY
Status: DISCONTINUED | OUTPATIENT
Start: 2025-01-07 | End: 2025-01-16 | Stop reason: HOSPADM

## 2025-01-07 RX ADMIN — FLUTICASONE PROPIONATE 1 SPRAY: 50 SPRAY, METERED NASAL at 16:01

## 2025-01-07 RX ADMIN — IOHEXOL 80 ML: 350 INJECTION, SOLUTION INTRAVENOUS at 15:31

## 2025-01-07 RX ADMIN — ASPIRIN 324 MG: 81 TABLET, CHEWABLE ORAL at 17:38

## 2025-01-07 RX ADMIN — ALBUTEROL SULFATE 2 PUFF: 90 AEROSOL, METERED RESPIRATORY (INHALATION) at 16:00

## 2025-01-07 RX ADMIN — LIDOCAINE HYDROCHLORIDE 10 ML: 20 SOLUTION ORAL; TOPICAL at 16:00

## 2025-01-07 RX ADMIN — ACETAMINOPHEN 650 MG: 325 TABLET, FILM COATED ORAL at 16:00

## 2025-01-07 NOTE — ADDENDUM NOTE
Addended by: SARABJIT BLANKENSHIP on: 1/7/2025 12:28 PM     Modules accepted: Orders, Level of Service

## 2025-01-07 NOTE — ASSESSMENT & PLAN NOTE
Reports of mouth pain for the past week or 2 with poor oral intake  Noted with mouth sore.  Care discussed with GI and currently viral workup including HSV mouth swab ordered  Follow-up with ID consultation.

## 2025-01-07 NOTE — PROGRESS NOTES
Name: Bart Jasso      : 1940      MRN: [id number]  Encounter Provider: MAKAYLA Chapman  Encounter Date: 2025   Encounter department: [facility] [address]  :  Assessment & Plan  Mouth sores  .  Start nystatin, swab sent today.  Lidocaine swish and swallow sent can use up to 4 times a day amoxicillin twice daily.  Will call with results follow-up as needed.  Call if symptoms are not improving  Orders:    Lidocaine Viscous HCl (XYLOCAINE) 2 % mucosal solution; Swish and spit 15 mL 4 (four) times a day as needed for mouth pain or discomfort    amoxicillin (AMOXIL) 400 MG/5ML suspension; Take 10.9 mL (875 mg total) by mouth 2 (two) times a day for 10 days    Throat culture    Bilateral rales  Rales on auscultation, cough present.  Will x-ray and call with results  Orders:    XR chest pa and lateral; Future    Cardiac arrhythmia, unspecified cardiac arrhythmia type  EKG in the office today Afib,   Chronic atrial fibrillation (HCC)  Afib RVR in the office, ADT order placed pt and family understand to report to ER.  Orders:    Ambulatory Referral to Cardiology; Future    Transfer to other facility           History of Present Illness     Bart Milan is here for an injury from her denture, she had the injury and now pain has spread to throat, she is not eating or drinking for 1 week. She is not able to put the denture back in due to pain. She is on nystatin now.     Oral Pain   Pertinent negatives include no fever.     Review of Systems   Constitutional:  Positive for chills. Negative for diaphoresis and fever.   HENT:  Positive for dental problem and sore throat.    Respiratory:  Positive for cough. Negative for chest tightness, shortness of breath and wheezing.    Gastrointestinal:  Negative for diarrhea, nausea and vomiting.   Skin:  Positive for wound.   Neurological:  Negative for dizziness and headaches.   All other systems reviewed and are  "negative.      Objective   /70 (BP Location: Right arm, Patient Position: Sitting, Cuff Size: Standard)   Pulse 73   Ht 5' 4\" (1.626 m)   Wt 60.6 kg (133 lb 9.6 oz)   SpO2 99%   BMI 22.93 kg/m²      Physical Exam  Vitals and nursing note reviewed.   Constitutional:       General: She is not in acute distress.     Appearance: Normal appearance. She is well-developed. She is not ill-appearing.   HENT:      Head: Normocephalic and atraumatic.      Right Ear: There is impacted cerumen.      Left Ear: Tympanic membrane, ear canal and external ear normal. There is no impacted cerumen.      Nose: Nose normal. No congestion.      Mouth/Throat:      Mouth: Oral lesions present.      Pharynx: Pharyngeal swelling and posterior oropharyngeal erythema present.   Eyes:      Extraocular Movements: Extraocular movements intact.      Conjunctiva/sclera: Conjunctivae normal.      Pupils: Pupils are equal, round, and reactive to light.   Cardiovascular:      Rate and Rhythm: Normal rate. Rhythm irregular.      Heart sounds: No murmur heard.  Pulmonary:      Effort: Pulmonary effort is normal. No respiratory distress.      Breath sounds: Rales present.   Abdominal:      Palpations: Abdomen is soft.      Tenderness: There is no abdominal tenderness.   Musculoskeletal:         General: No swelling.      Cervical back: Normal range of motion and neck supple.      Right lower leg: No edema.      Left lower leg: No edema.   Lymphadenopathy:      Cervical: No cervical adenopathy.   Skin:     General: Skin is warm and dry.      Capillary Refill: Capillary refill takes less than 2 seconds.   Neurological:      General: No focal deficit present.      Mental Status: She is alert.   Psychiatric:         Mood and Affect: Mood normal.         Behavior: Behavior normal.         "

## 2025-01-07 NOTE — ASSESSMENT & PLAN NOTE
Noted to have platelet count of 33  Unclear etiology   given mild anemia we will check LDH, haptoglobin, reticulocyte count to rule out hemolytic anemia  Hematology consult for further evaluation

## 2025-01-07 NOTE — H&P
H&P - Hospitalist   Name: Bart Jasso 84 y.o. female I MRN: [id number]  Unit/Bed#: ED 13 I Date of Admission: 1/7/2025   Date of Service: 1/7/2025 I Hospital Day: 0     Assessment & Plan  Elevated troponin  Noted to have troponin of 120 in the emergency department  Unclear etiology  EKG revealed no acute abnormalities  Currently without chest pain  Was sent in by primary care office as well as reportedly noted to have A-fib with RVR  Currently in normal sinus rhythm  Will trend troponins  Check echo  Pending echo May benefit from stress test if noted to have regional wall abnormalities  Telemetry monitoring  Consider cardiology consult  Cardiac arrhythmia  Reportedly had A-fib with RVR and outpatient PCP office earlier today  Currently normal sinus rhythm and rate controlled  Will monitor on telemetry for now  Mouth pain  Reports of mouth pain for the past week or 2 with poor oral intake  No signs of acute infection  Will provide Magic mouthwash  Thrombocytopenia (HCC)  Noted to have platelet count of 33  Unclear etiology   given mild anemia we will check LDH, haptoglobin, reticulocyte count to rule out hemolytic anemia  Hematology consult for further evaluation      VTE Pharmacologic Prophylaxis:   Moderate Risk (Score 3-4) - Pharmacological DVT Prophylaxis Ordered: heparin.  Code Status: full code  Discussion with family: Patient declined call to .     Anticipated Length of Stay: Patient will be admitted on an observation basis with an anticipated length of stay of less than 2 midnights secondary to elevated troponin.    History of Present Illness   Chief Complaint: Mouth pain    Bart Jasso is a 84 y.o. female Mongolian-speaking patient with no significant past medical history initially presented with mouth pain to her PCP office.  Reports mouth pain for the past few days and reports poor oral intake.  She otherwise denies any acute complaints.  Denies chest pain, shortness of  breath and cough, fevers, chills, abdominal pain, nausea  Review of Systems   Constitutional:  Negative for activity change, appetite change, chills, diaphoresis, fever and unexpected weight change.   HENT:  Negative for congestion, facial swelling and rhinorrhea.    Eyes:  Negative for photophobia and visual disturbance.   Respiratory:  Negative for cough, shortness of breath and wheezing.    Cardiovascular:  Negative for chest pain and palpitations.   Gastrointestinal:  Negative for abdominal pain, blood in stool, constipation, diarrhea, nausea and vomiting.   Genitourinary:  Negative for decreased urine volume, difficulty urinating, dysuria, flank pain, frequency, hematuria and urgency.   Musculoskeletal:  Negative for arthralgias, back pain, joint swelling and myalgias.   Neurological:  Negative for dizziness, syncope, facial asymmetry, light-headedness, numbness and headaches.   Psychiatric/Behavioral:  Negative for confusion and decreased concentration. The patient is not nervous/anxious.        Historical Information   History reviewed. No pertinent past medical history.  History reviewed. No pertinent surgical history.  Social History     Tobacco Use    Smoking status: Never     Passive exposure: Never    Smokeless tobacco: Never   Substance and Sexual Activity    Alcohol use: Not Currently    Drug use: Never    Sexual activity: Not Currently     E-Cigarette/Vaping     E-Cigarette/Vaping Substances     History reviewed. No pertinent family history.  Social History:  Marital Status: Single   O  Patient Pre-hospital Living Situation: Home  Patient Pre-hospital Level of Mobility: walks  Patient Pre-hospital Diet Restrictions: none    Meds/Allergies   I have reviewed home medications with patient personally.  Prior to Admission medications    Medication Sig Start Date End Date Taking? Authorizing Provider   albuterol (Ventolin HFA) 90 mcg/act inhaler Inhale 2 puffs every 6 (six) hours as needed for wheezing  12/28/23   MAKAYLA Chapman   amoxicillin (AMOXIL) 400 MG/5ML suspension Take 10.9 mL (875 mg total) by mouth 2 (two) times a day for 10 days 1/7/25 1/17/25  MAKAYLA Chapman   fluticasone (FLONASE) 50 mcg/act nasal spray 1 spray into each nostril daily 12/28/23   MAKAYLA Chapman   Lidocaine Viscous HCl (XYLOCAINE) 2 % mucosal solution Swish and spit 15 mL 4 (four) times a day as needed for mouth pain or discomfort 1/7/25   MAKAYLA Chapman   meloxicam (Mobic) 15 mg tablet Take 1 tablet (15 mg total) by mouth daily 12/28/23   MAKAYLA Chapman   promethazine-dextromethorphan (PHENERGAN-DM) 6.25-15 mg/5 mL oral syrup Take 5 mL by mouth 4 (four) times a day as needed for cough 12/28/23   MAKAYLA Chapman     No Known Allergies    Objective :  Temp:  [98.6 °F (37 °C)] 98.6 °F (37 °C)  HR:  [73-83] 83  BP: (100-120)/(50-70) 100/50  Resp:  [20] 20  SpO2:  [96 %-99 %] 96 %  O2 Device: None (Room air)    Physical Exam  Constitutional:       General: She is not in acute distress.     Appearance: She is well-developed. She is not diaphoretic.   HENT:      Head: Normocephalic and atraumatic.      Nose: Nose normal.      Mouth/Throat:      Pharynx: No oropharyngeal exudate.   Eyes:      General: No scleral icterus.        Right eye: No discharge.         Left eye: No discharge.      Conjunctiva/sclera: Conjunctivae normal.   Neck:      Thyroid: No thyromegaly.      Vascular: No JVD.   Cardiovascular:      Rate and Rhythm: Normal rate and regular rhythm.      Heart sounds: Normal heart sounds. No murmur heard.     No friction rub. No gallop.   Pulmonary:      Effort: Pulmonary effort is normal. No respiratory distress.      Breath sounds: Normal breath sounds. No wheezing or rales.   Chest:      Chest wall: No tenderness.   Abdominal:      General: Bowel sounds are normal. There is no distension.      Palpations: Abdomen is soft.      Tenderness: There is no abdominal tenderness. There is no  guarding or rebound.   Musculoskeletal:         General: No tenderness or deformity. Normal range of motion.      Cervical back: Normal range of motion and neck supple.   Skin:     General: Skin is warm and dry.      Findings: No erythema or rash.   Neurological:      Mental Status: She is alert. Mental status is at baseline.      Cranial Nerves: No cranial nerve deficit.      Sensory: No sensory deficit.      Motor: No abnormal muscle tone.      Coordination: Coordination normal.          Lines/Drains:            Lab Results: I have reviewed the following results:  Results from last 7 days   Lab Units 01/07/25  1357   WBC Thousand/uL 3.19*   HEMOGLOBIN g/dL 11.4*   HEMATOCRIT % 35.1   PLATELETS Thousands/uL 33*   SEGS PCT % 43   LYMPHO PCT % 54*   MONO PCT % 1*   EOS PCT % 1     Results from last 7 days   Lab Units 01/07/25  1357   SODIUM mmol/L 140   POTASSIUM mmol/L 4.4   CHLORIDE mmol/L 107   CO2 mmol/L 26   BUN mg/dL 26*   CREATININE mg/dL 1.14   ANION GAP mmol/L 7   CALCIUM mg/dL 8.7   ALBUMIN g/dL 3.7   TOTAL BILIRUBIN mg/dL 0.89   ALK PHOS U/L 68   ALT U/L 14   AST U/L 12*   GLUCOSE RANDOM mg/dL 114     Results from last 7 days   Lab Units 01/07/25  1357   INR  1.13         Lab Results   Component Value Date    HGBA1C 6.1 04/11/2023     Results from last 7 days   Lab Units 01/07/25  1357   LACTIC ACID mmol/L 1.1   PROCALCITONIN ng/ml <0.05       Imaging Results Review: I personally reviewed the following image studies/reports in PACS and discussed pertinent findings with Radiology: chest xray. My interpretation of the radiology images/reports is:  .  Other Study Results Review: EKG was reviewed.     Administrative Statements   I have spent a total time of 60 minutes in caring for this patient on the day of the visit/encounter including Diagnostic results.    ** Please Note: This note has been constructed using a voice recognition system. **

## 2025-01-07 NOTE — ASSESSMENT & PLAN NOTE
Reports of mouth pain for the past week or 2 with poor oral intake  No signs of acute infection  Will provide Magic mouthwash

## 2025-01-07 NOTE — ED PROVIDER NOTES
Time reflects when diagnosis was documented in both MDM as applicable and the Disposition within this note       Time User Action Codes Description Comment    1/7/2025  3:18 PM Kamlesh Foster Add [R79.89] Elevated troponin     1/7/2025  4:23 PM Balbir Moore Add [D69.6] Thrombocytopenia (HCC)           ED Disposition       ED Disposition   Admit    Condition   Stable    Date/Time   Tue Jan 7, 2025  3:41 PM    Comment   Case was discussed with hospitalist and the patient's admission status was agreed to be Admission Status: observation status to the service of Dr. Moore .               Assessment & Plan       Medical Decision Making  Amount and/or Complexity of Data Reviewed  Labs: ordered.  Radiology: ordered.    Risk  Prescription drug management.  Decision regarding hospitalization.    Medical decision making 84-year-old female presents emergency department difficulty story and has non-English-speaking but history through the language line apparently cough and congestion went to urgent care today because of soreness in her mouth apparently has a laceration her mouth related to her dentures and then has pain rating down into her chest.  EKG showed no ST elevation.  Her cardiac troponin was 120 with normal renal function.  Concern for non-ST elevation MI.  Patient will require further evaluation.  Cussed with the hospitalist service will admit.  Treated with aspirin         Medications   acetaminophen (TYLENOL) tablet 650 mg (650 mg Oral Given 1/7/25 1600)   diphenhydramine, lidocaine, Al/Mg hydroxide, simethicone (Magic Mouthwash) oral solution 10 mL (10 mL Swish & Spit Given 1/7/25 1600)   albuterol (PROVENTIL HFA,VENTOLIN HFA) inhaler 2 puff (2 puffs Inhalation Given 1/7/25 1600)   fluticasone (FLONASE) 50 mcg/act nasal spray 1 spray (1 spray Nasal Given 1/7/25 1601)   aspirin chewable tablet 324 mg (has no administration in time range)   iohexol (OMNIPAQUE) 350 MG/ML injection (MULTI-DOSE) 80 mL (80 mL  Intravenous Given 1/7/25 1531)       ED Risk Strat Scores          Chest x-ray: Chest x-ray showed a normal cardiac silhouette, no pneumothorax no infiltrates, No sign of pathology, interpreted by me, I was the primary .       Diagnostic testing  White count was normal at 3.1 no sign of inflammation hemoglobin is normal 11 no sign of anemia  Lactate was 1.1 no sign of severe sepsis  Electrolytes within normal limits.  Platelets were reduced.  33.    Initial cardiac troponin was 120, there were no acute EKG changes possible STEMI difficult to tell as difficult historian.      Chest x-ray: Chest x-ray showed a normal cardiac silhouette, no pneumothorax no infiltrates, No sign of pathology, interpreted by me, I was the primary .  CT scan of the chest was done because patient had elevated troponin concern for pulmonary malign strain showed left lower lobe bronchial inflammation no airspace consolidation, no pulmonary emboli.                          History of Present Illness       Chief Complaint   Patient presents with    Mouth Lesions     Pt sent from PCP for sore throat and sores in her mouth for the past 15 days.        History reviewed. No pertinent past medical history.   History reviewed. No pertinent surgical history.   History reviewed. No pertinent family history.   Social History     Tobacco Use    Smoking status: Never     Passive exposure: Never    Smokeless tobacco: Never   Substance Use Topics    Alcohol use: Not Currently    Drug use: Never      E-Cigarette/Vaping      E-Cigarette/Vaping Substances      I have reviewed and agree with the history as documented.     HPI the patient is an 84-year-old female her son speaks Tu[place] and the history is through the language line apparently patient speaks another language that we do not know what exactly it is but this time can interpret for her.  Son reports they were seen at the urgent care clinic he was told that she had heart and lung  issues and that she should come to the hospital.  Patient reports sores in her mouth and pain when she swallows she reports the pain goes down into her chest.  She describes a chest pain seems to be worse with swallowing and with movement of her tongue and lips.  Denies any fever or chills.  She denies any vomiting.  Son reports she has no difficulty breathing.  They report she developed mouth lesions over the last 2 weeks.  Apparently seen at urgent care today and given nystatin and amoxicillin although just treated with Augmentin.  Apparently had an injury from her denture.  Past medical history cough and congestion ED visit back in March 2023.  Family history noncontributory  Social history here with her family non-smoker    Review of Systems   Constitutional:  Negative for fever.   HENT:  Positive for mouth sores. Negative for congestion.    Eyes:  Negative for pain and redness.   Respiratory:  Positive for cough. Negative for shortness of breath.    Cardiovascular:  Negative for chest pain.   Gastrointestinal:  Negative for abdominal pain and vomiting.   Oral pain        Objective       ED Triage Vitals   Temperature Pulse Blood Pressure Respirations SpO2 Patient Position - Orthostatic VS   01/07/25 1314 01/07/25 1314 01/07/25 1314 01/07/25 1314 01/07/25 1314 01/07/25 1314   98.6 °F (37 °C) 83 100/50 20 96 % Sitting      Temp Source Heart Rate Source BP Location FiO2 (%) Pain Score    01/07/25 1314 01/07/25 1314 01/07/25 1314 -- 01/07/25 1600    Temporal Monitor Left arm  10 - Worst Possible Pain      Vitals      Date and Time Temp Pulse SpO2 Resp BP Pain Score FACES Pain Rating User   01/07/25 1631 -- -- -- -- -- 5 mouth -- AM   01/07/25 1600 -- 71 96 % 16 115/56 10 - Worst Possible Pain -- AM   01/07/25 1500 -- 72 96 % 17 116/63 -- -- SG   01/07/25 1314 98.6 °F (37 °C) 83 96 % 20 100/50 -- -- GP            Physical Exam  Vitals and nursing note reviewed.   Constitutional:       Appearance: She is  well-developed.   HENT:      Head: Normocephalic.      Right Ear: External ear normal.      Left Ear: External ear normal.      Nose: Nose normal.      Mouth/Throat:      Mouth: Mucous membranes are moist.      Pharynx: Oropharynx is clear.      Comments: Laceration of the upper palate consistent with an injury probably secondary to the patient's dentures, no viral lesions  Eyes:      General: Lids are normal.      Extraocular Movements: Extraocular movements intact.      Pupils: Pupils are equal, round, and reactive to light.   Cardiovascular:      Rate and Rhythm: Normal rate and regular rhythm.   Pulmonary:      Effort: Pulmonary effort is normal. No respiratory distress.   Musculoskeletal:         General: No deformity. Normal range of motion.      Cervical back: Normal range of motion and neck supple.   Skin:     General: Skin is warm and dry.   Neurological:      Mental Status: She is alert and oriented to person, place, and time.   Psychiatric:         Mood and Affect: Mood normal.         Results Reviewed       Procedure Component Value Units Date/Time    Lactate dehydrogenase [[id number]] Collected: 01/07/25 1357    Lab Status: In process Specimen: Blood from Arm, Right Updated: 01/07/25 1632    Retic Count [[id number]]     Lab Status: No result Specimen: Blood     HS Troponin I 2hr [[id number]] Collected: 01/07/25 1612    Lab Status: In process Specimen: Blood from Arm, Right Updated: 01/07/25 1615    HS Troponin I 4hr [[id number]]     Lab Status: No result Specimen: Blood     Procalcitonin [[id number]]  (Normal) Collected: 01/07/25 1357    Lab Status: Final result Specimen: Blood from Arm, Right Updated: 01/07/25 1541     Procalcitonin <0.05 ng/ml     B-Type Natriuretic Peptide(BNP) [[id number]]  (Abnormal) Collected: 01/07/25 1357    Lab Status: Final result Specimen: Blood from Arm, Right Updated: 01/07/25 1535      pg/mL     Smear Review(Phlebs Do Not Order) [[id number]]  (Abnormal) Collected: 01/07/25  1357    Lab Status: Final result Specimen: Blood from Arm, Right Updated: 01/07/25 1531     RBC Morphology Normal     Platelet Estimate Decreased     Large Platelet Present     Pathology Review No    Comprehensive metabolic panel [[id number]]  (Abnormal) Collected: 01/07/25 1357    Lab Status: Final result Specimen: Blood from Arm, Right Updated: 01/07/25 1504     Sodium 140 mmol/L      Potassium 4.4 mmol/L      Chloride 107 mmol/L      CO2 26 mmol/L      ANION GAP 7 mmol/L      BUN 26 mg/dL      Creatinine 1.14 mg/dL      Glucose 114 mg/dL      Calcium 8.7 mg/dL      AST 12 U/L      ALT 14 U/L      Alkaline Phosphatase 68 U/L      Total Protein 6.1 g/dL      Albumin 3.7 g/dL      Total Bilirubin 0.89 mg/dL      eGFR 44 ml/min/1.73sq m     Narrative:      National Kidney Disease Foundation guidelines for Chronic Kidney Disease (CKD):     Stage 1 with normal or high GFR (GFR > 90 mL/min/1.73 square meters)    Stage 2 Mild CKD (GFR = 60-89 mL/min/1.73 square meters)    Stage 3A Moderate CKD (GFR = 45-59 mL/min/1.73 square meters)    Stage 3B Moderate CKD (GFR = 30-44 mL/min/1.73 square meters)    Stage 4 Severe CKD (GFR = 15-29 mL/min/1.73 square meters)    Stage 5 End Stage CKD (GFR <15 mL/min/1.73 square meters)  Note: GFR calculation is accurate only with a steady state creatinine    Lactic acid [[id number]]  (Normal) Collected: 01/07/25 1357    Lab Status: Final result Specimen: Blood from Arm, Right Updated: 01/07/25 1503     LACTIC ACID 1.1 mmol/L     Narrative:      Result may be elevated if tourniquet was used during collection.    CBC and differential [[id number]]  (Abnormal) Collected: 01/07/25 1357    Lab Status: Final result Specimen: Blood from Arm, Right Updated: 01/07/25 1455     WBC 3.19 Thousand/uL      RBC 4.01 Million/uL      Hemoglobin 11.4 g/dL      Hematocrit 35.1 %      MCV 88 fL      MCH 28.4 pg      MCHC 32.5 g/dL      RDW 13.8 %      MPV 11.1 fL      Platelets 33 Thousands/uL      nRBC 0 /100  WBCs      Segmented % 43 %      Immature Grans % 1 %      Lymphocytes % 54 %      Monocytes % 1 %      Eosinophils Relative 1 %      Basophils Relative 0 %      Absolute Neutrophils 1.38 Thousands/µL      Absolute Immature Grans 0.02 Thousand/uL      Absolute Lymphocytes 1.74 Thousands/µL      Absolute Monocytes 0.02 Thousand/µL      Eosinophils Absolute 0.03 Thousand/µL      Basophils Absolute 0.00 Thousands/µL     Narrative:      This is an appended report.  These results have been appended to a previously verified report.    Protime-INR [[id number]]  (Abnormal) Collected: 01/07/25 1357    Lab Status: Final result Specimen: Blood from Arm, Right Updated: 01/07/25 1441     Protime 15.2 seconds      INR 1.13    Narrative:      INR Therapeutic Range    Indication                                             INR Range      Atrial Fibrillation                                               2.0-3.0  Hypercoagulable State                                    2.0.2.3  Left Ventricular Asist Device                            2.0-3.0  Mechanical Heart Valve                                  -    Aortic(with afib, MI, embolism, HF, LA enlargement,    and/or coagulopathy)                                     2.0-3.0 (2.5-3.5)     Mitral                                                             2.5-3.5  Prosthetic/Bioprosthetic Heart Valve               2.0-3.0  Venous thromboembolism (VTE: VT, PE        2.0-3.0    APTT [[id number]]  (Normal) Collected: 01/07/25 1357    Lab Status: Final result Specimen: Blood from Arm, Right Updated: 01/07/25 1441     PTT 29 seconds     HS Troponin 0hr (reflex protocol) [[id number]]  (Abnormal) Collected: 01/07/25 1357    Lab Status: Final result Specimen: Blood from Arm, Right Updated: 01/07/25 1437     hs TnI 0hr 120 ng/L     Blood culture #1 [[id number]] Collected: 01/07/25 1403    Lab Status: In process Specimen: Blood from Arm, Right Updated: 01/07/25 1412    Blood culture #2 [[id number]]  Collected: 25 1357    Lab Status: In process Specimen: Blood from Arm, Right Updated: 25 1412    UA w Reflex to Microscopic w Reflex to Culture [[id number]]     Lab Status: No result Specimen: Urine             CTA chest pe study   Final Interpretation by Samuel Renteria MD ( [id number])         1. Right lower lobe bronchial inflammation. No airspace consolidation.   2. No evidence of acute pulmonary embolus.                  Workstation performed: [id number]         XR chest pa and lateral   Final Interpretation by Lane Uriarte MD ( [id number])      No acute cardiopulmonary disease.            Workstation performed: [id number]             ECG 12 Lead Documentation Only    Date/Time: 2025 2:22 PM    Performed by: Kamlesh Foster MD  Authorized by: Kamlesh Foster MD    Indications / Diagnosis:  Difficulty swallowing cough congestion  Patient location:  ED  Previous ECG:     Previous ECG:  Compared to current    Comparison ECG info:  2023    Similarity:  Changes noted  Interpretation:     Interpretation: non-specific    Rate:     ECG rate:  74    ECG rate assessment: normal    Rhythm:     Rhythm: sinus rhythm    Comments:      Normal sinus rhythm poor anterior forces no acute ST-T wave changes      ED Medication and Procedure Management   Prior to Admission Medications   Prescriptions Last Dose Informant Patient Reported? Taking?   Lidocaine Viscous HCl (XYLOCAINE) 2 % mucosal solution   No No   Sig: Swish and spit 15 mL 4 (four) times a day as needed for mouth pain or discomfort   albuterol (Ventolin HFA) 90 mcg/act inhaler   No No   Sig: Inhale 2 puffs every 6 (six) hours as needed for wheezing   amoxicillin (AMOXIL) 400 MG/5ML suspension   No No   Sig: Take 10.9 mL (875 mg total) by mouth 2 (two) times a day for 10 days   fluticasone (FLONASE) 50 mcg/act nasal spray   No No   Si spray into each nostril daily   meloxicam (Mobic) 15 mg tablet   No No   Sig: Take 1 tablet (15 mg  total) by mouth daily   promethazine-dextromethorphan (PHENERGAN-DM) 6.25-15 mg/5 mL oral syrup   No No   Sig: Take 5 mL by mouth 4 (four) times a day as needed for cough      Facility-Administered Medications: None     Patient's Medications   Discharge Prescriptions    No medications on file     No discharge procedures on file.  ED SEPSIS DOCUMENTATION   Time reflects when diagnosis was documented in both MDM as applicable and the Disposition within this note       Time User Action Codes Description Comment    1/7/2025  3:18 PM Kamlesh Foster Add [R79.89] Elevated troponin     1/7/2025  4:23 PM Balbir Moore Add [D69.6] Thrombocytopenia (HCC)                  Kamlesh Foster MD  01/07/25 [id number]

## 2025-01-07 NOTE — ASSESSMENT & PLAN NOTE
Reportedly had A-fib with RVR and outpatient PCP office earlier today  Currently normal sinus rhythm and rate controlled  Will monitor on telemetry for now

## 2025-01-07 NOTE — ASSESSMENT & PLAN NOTE
Noted to have troponin of 120 in the emergency department  Unclear etiology  EKG revealed no acute abnormalities  Currently without chest pain  Was sent in by primary care office as well as reportedly noted to have A-fib with RVR  Currently in normal sinus rhythm  Will trend troponins  Check echo  Pending echo May benefit from stress test if noted to have regional wall abnormalities  Telemetry monitoring  Consider cardiology consult

## 2025-01-07 NOTE — ASSESSMENT & PLAN NOTE
84-year-old female patient from [facility] here with multiple problems.  Elevated troponin likely secondary to A-fib RVR.  Troponins are elevated however flat.  Follow-up with cardiology recommendation.

## 2025-01-08 ENCOUNTER — APPOINTMENT (OUTPATIENT)
Dept: NON INVASIVE DIAGNOSTICS | Facility: HOSPITAL | Age: 85
DRG: 660 | End: 2025-01-08
Payer: COMMERCIAL

## 2025-01-08 PROBLEM — I48.0 PAROXYSMAL ATRIAL FIBRILLATION (HCC): Status: ACTIVE | Noted: 2025-01-08

## 2025-01-08 PROBLEM — N18.30 CKD (CHRONIC KIDNEY DISEASE) STAGE 3, GFR 30-59 ML/MIN (HCC): Status: ACTIVE | Noted: 2025-01-08

## 2025-01-08 PROBLEM — D61.818 PANCYTOPENIA (HCC): Status: ACTIVE | Noted: 2025-01-08

## 2025-01-08 PROBLEM — D69.6 THROMBOCYTOPENIA (HCC): Status: RESOLVED | Noted: 2025-01-07 | Resolved: 2025-01-08

## 2025-01-08 LAB
2HR DELTA HS TROPONIN: -2 NG/L
4HR DELTA HS TROPONIN: -8 NG/L
ABO GROUP BLD: NORMAL
ABO GROUP BLD: NORMAL
ANION GAP SERPL CALCULATED.3IONS-SCNC: 4 MMOL/L (ref 4–13)
AORTIC ROOT: 3.7 CM
ASCENDING AORTA: 3.9 CM
AV LVOT MEAN GRADIENT: 2 MMHG
AV LVOT PEAK GRADIENT: 3 MMHG
AV REGURGITATION PRESSURE HALF TIME: 369 MS
BASOPHILS # BLD AUTO: 0 THOUSANDS/ΜL (ref 0–0.1)
BASOPHILS NFR BLD AUTO: 0 % (ref 0–1)
BLD GP AB SCN SERPL QL: NEGATIVE
BSA FOR ECHO PROCEDURE: 1.64 M2
BUN SERPL-MCNC: 27 MG/DL (ref 5–25)
CALCIUM SERPL-MCNC: 8.1 MG/DL (ref 8.4–10.2)
CARDIAC TROPONIN I PNL SERPL HS: 84 NG/L (ref ?–50)
CARDIAC TROPONIN I PNL SERPL HS: 90 NG/L (ref ?–50)
CARDIAC TROPONIN I PNL SERPL HS: 92 NG/L (ref ?–50)
CHLORIDE SERPL-SCNC: 109 MMOL/L (ref 96–108)
CO2 SERPL-SCNC: 27 MMOL/L (ref 21–32)
CREAT SERPL-MCNC: 0.93 MG/DL (ref 0.6–1.3)
DOP CALC LVOT PEAK VEL VTI: 18.3 CM
DOP CALC LVOT PEAK VEL: 0.82 M/S
E WAVE DECELERATION TIME: 224 MS
E/A RATIO: 0.75
EOSINOPHIL # BLD AUTO: 0.16 THOUSAND/ΜL (ref 0–0.61)
EOSINOPHIL NFR BLD AUTO: 7 % (ref 0–6)
ERYTHROCYTE [DISTWIDTH] IN BLOOD BY AUTOMATED COUNT: 13.9 % (ref 11.6–15.1)
FERRITIN SERPL-MCNC: 278 NG/ML (ref 11–307)
FOLATE SERPL-MCNC: 5.3 NG/ML
FRACTIONAL SHORTENING: 30 (ref 28–44)
GFR SERPL CREATININE-BSD FRML MDRD: 56 ML/MIN/1.73SQ M
GLUCOSE SERPL-MCNC: 105 MG/DL (ref 65–140)
HCT VFR BLD AUTO: 30.7 % (ref 34.8–46.1)
HGB BLD-MCNC: 10.2 G/DL (ref 11.5–15.4)
IMM GRANULOCYTES # BLD AUTO: 0.01 THOUSAND/UL (ref 0–0.2)
IMM GRANULOCYTES NFR BLD AUTO: 0 % (ref 0–2)
INTERVENTRICULAR SEPTUM IN DIASTOLE (PARASTERNAL SHORT AXIS VIEW): 1 CM
INTERVENTRICULAR SEPTUM: 1 CM (ref 0.6–1.1)
IRON SATN MFR SERPL: 16 % (ref 15–50)
IRON SERPL-MCNC: 32 UG/DL (ref 50–212)
LA/AORTA RATIO 2D: 0.78
LAAS-AP2: 11.6 CM2
LAAS-AP4: 16.9 CM2
LEFT ATRIUM SIZE: 2.9 CM
LEFT INTERNAL DIMENSION IN SYSTOLE: 3.3 CM (ref 2.1–4)
LEFT VENTRICULAR INTERNAL DIMENSION IN DIASTOLE: 4.7 CM (ref 3.5–6)
LEFT VENTRICULAR POSTERIOR WALL IN END DIASTOLE: 1 CM
LEFT VENTRICULAR STROKE VOLUME: 56 ML
LV EF US.2D.A4C+ESTIMATED: 59 %
LVSV (TEICH): 56 ML
LYMPHOCYTES # BLD AUTO: 1.42 THOUSANDS/ΜL (ref 0.6–4.47)
LYMPHOCYTES NFR BLD AUTO: 64 % (ref 14–44)
MCH RBC QN AUTO: 28.9 PG (ref 26.8–34.3)
MCHC RBC AUTO-ENTMCNC: 33.2 G/DL (ref 31.4–37.4)
MCV RBC AUTO: 87 FL (ref 82–98)
MONOCYTES # BLD AUTO: 0.01 THOUSAND/ΜL (ref 0.17–1.22)
MONOCYTES NFR BLD AUTO: 0 % (ref 4–12)
MV E'TISSUE VEL-SEP: 7 CM/S
MV PEAK A VEL: 0.85 M/S
MV PEAK E VEL: 64 CM/S
MV STENOSIS PRESSURE HALF TIME: 65 MS
MV VALVE AREA P 1/2 METHOD: 3.38
NEUTROPHILS # BLD AUTO: 0.66 THOUSANDS/ΜL (ref 1.85–7.62)
NEUTS SEG NFR BLD AUTO: 29 % (ref 43–75)
NRBC BLD AUTO-RTO: 0 /100 WBCS
PLATELET # BLD AUTO: 26 THOUSANDS/UL (ref 149–390)
PMV BLD AUTO: 12 FL (ref 8.9–12.7)
POTASSIUM SERPL-SCNC: 4.1 MMOL/L (ref 3.5–5.3)
RBC # BLD AUTO: 3.53 MILLION/UL (ref 3.81–5.12)
RH BLD: POSITIVE
RH BLD: POSITIVE
RIGHT VENTRICLE ID DIMENSION: 2.7 CM
SL CV AV PEAK GRADIENT RETROGRADE: 38 MMHG
SL CV LV EF: 52
SL CV PED ECHO LEFT VENTRICLE DIASTOLIC VOLUME (MOD BIPLANE) 2D: 100 ML
SL CV PED ECHO LEFT VENTRICLE SYSTOLIC VOLUME (MOD BIPLANE) 2D: 44 ML
SODIUM SERPL-SCNC: 140 MMOL/L (ref 135–147)
SPECIMEN EXPIRATION DATE: NORMAL
TIBC SERPL-MCNC: 197.4 UG/DL (ref 250–450)
TRANSFERRIN SERPL-MCNC: 141 MG/DL (ref 203–362)
TRICUSPID ANNULAR PLANE SYSTOLIC EXCURSION: 2 CM
UIBC SERPL-MCNC: 165 UG/DL (ref 155–355)
VIT B12 SERPL-MCNC: 937 PG/ML (ref 180–914)
WBC # BLD AUTO: 2.26 THOUSAND/UL (ref 4.31–10.16)

## 2025-01-08 PROCEDURE — 88184 FLOWCYTOMETRY/ TC 1 MARKER: CPT | Performed by: PHYSICIAN ASSISTANT

## 2025-01-08 PROCEDURE — 82607 VITAMIN B-12: CPT | Performed by: PHYSICIAN ASSISTANT

## 2025-01-08 PROCEDURE — 93306 TTE W/DOPPLER COMPLETE: CPT

## 2025-01-08 PROCEDURE — 86645 CMV ANTIBODY IGM: CPT | Performed by: PHYSICIAN ASSISTANT

## 2025-01-08 PROCEDURE — 86665 EPSTEIN-BARR CAPSID VCA: CPT | Performed by: PHYSICIAN ASSISTANT

## 2025-01-08 PROCEDURE — 86900 BLOOD TYPING SEROLOGIC ABO: CPT | Performed by: STUDENT IN AN ORGANIZED HEALTH CARE EDUCATION/TRAINING PROGRAM

## 2025-01-08 PROCEDURE — 83540 ASSAY OF IRON: CPT | Performed by: PHYSICIAN ASSISTANT

## 2025-01-08 PROCEDURE — 87254 VIRUS INOCULATION SHELL VIA: CPT | Performed by: PHYSICIAN ASSISTANT

## 2025-01-08 PROCEDURE — 86850 RBC ANTIBODY SCREEN: CPT | Performed by: STUDENT IN AN ORGANIZED HEALTH CARE EDUCATION/TRAINING PROGRAM

## 2025-01-08 PROCEDURE — 86664 EPSTEIN-BARR NUCLEAR ANTIGEN: CPT | Performed by: PHYSICIAN ASSISTANT

## 2025-01-08 PROCEDURE — 84165 PROTEIN E-PHORESIS SERUM: CPT | Performed by: PHYSICIAN ASSISTANT

## 2025-01-08 PROCEDURE — 86663 EPSTEIN-BARR ANTIBODY: CPT | Performed by: PHYSICIAN ASSISTANT

## 2025-01-08 PROCEDURE — 86704 HEP B CORE ANTIBODY TOTAL: CPT | Performed by: PHYSICIAN ASSISTANT

## 2025-01-08 PROCEDURE — 87252 VIRUS INOCULATION TISSUE: CPT | Performed by: PHYSICIAN ASSISTANT

## 2025-01-08 PROCEDURE — 85025 COMPLETE CBC W/AUTO DIFF WBC: CPT | Performed by: INTERNAL MEDICINE

## 2025-01-08 PROCEDURE — 83010 ASSAY OF HAPTOGLOBIN QUANT: CPT | Performed by: INTERNAL MEDICINE

## 2025-01-08 PROCEDURE — NC001 PR NO CHARGE: Performed by: NURSE PRACTITIONER

## 2025-01-08 PROCEDURE — 88185 FLOWCYTOMETRY/TC ADD-ON: CPT | Performed by: PHYSICIAN ASSISTANT

## 2025-01-08 PROCEDURE — 99254 IP/OBS CNSLTJ NEW/EST MOD 60: CPT | Performed by: INTERNAL MEDICINE

## 2025-01-08 PROCEDURE — 82746 ASSAY OF FOLIC ACID SERUM: CPT | Performed by: PHYSICIAN ASSISTANT

## 2025-01-08 PROCEDURE — P9055 PLT, APH/PHER, L/R, CMV-NEG: HCPCS

## 2025-01-08 PROCEDURE — 99222 1ST HOSP IP/OBS MODERATE 55: CPT | Performed by: PHYSICIAN ASSISTANT

## 2025-01-08 PROCEDURE — 87529 HSV DNA AMP PROBE: CPT | Performed by: PHYSICIAN ASSISTANT

## 2025-01-08 PROCEDURE — 83550 IRON BINDING TEST: CPT | Performed by: PHYSICIAN ASSISTANT

## 2025-01-08 PROCEDURE — 86901 BLOOD TYPING SEROLOGIC RH(D): CPT | Performed by: STUDENT IN AN ORGANIZED HEALTH CARE EDUCATION/TRAINING PROGRAM

## 2025-01-08 PROCEDURE — 87389 HIV-1 AG W/HIV-1&-2 AB AG IA: CPT | Performed by: PHYSICIAN ASSISTANT

## 2025-01-08 PROCEDURE — 99222 1ST HOSP IP/OBS MODERATE 55: CPT | Performed by: INTERNAL MEDICINE

## 2025-01-08 PROCEDURE — 93306 TTE W/DOPPLER COMPLETE: CPT | Performed by: INTERNAL MEDICINE

## 2025-01-08 PROCEDURE — 80048 BASIC METABOLIC PNL TOTAL CA: CPT | Performed by: INTERNAL MEDICINE

## 2025-01-08 PROCEDURE — 30233R1 TRANSFUSION OF NONAUTOLOGOUS PLATELETS INTO PERIPHERAL VEIN, PERCUTANEOUS APPROACH: ICD-10-PCS | Performed by: STUDENT IN AN ORGANIZED HEALTH CARE EDUCATION/TRAINING PROGRAM

## 2025-01-08 PROCEDURE — 86803 HEPATITIS C AB TEST: CPT | Performed by: PHYSICIAN ASSISTANT

## 2025-01-08 PROCEDURE — 86334 IMMUNOFIX E-PHORESIS SERUM: CPT | Performed by: PHYSICIAN ASSISTANT

## 2025-01-08 PROCEDURE — 84484 ASSAY OF TROPONIN QUANT: CPT | Performed by: STUDENT IN AN ORGANIZED HEALTH CARE EDUCATION/TRAINING PROGRAM

## 2025-01-08 PROCEDURE — 87340 HEPATITIS B SURFACE AG IA: CPT | Performed by: PHYSICIAN ASSISTANT

## 2025-01-08 PROCEDURE — 99233 SBSQ HOSP IP/OBS HIGH 50: CPT | Performed by: STUDENT IN AN ORGANIZED HEALTH CARE EDUCATION/TRAINING PROGRAM

## 2025-01-08 PROCEDURE — 86705 HEP B CORE ANTIBODY IGM: CPT | Performed by: PHYSICIAN ASSISTANT

## 2025-01-08 PROCEDURE — 82728 ASSAY OF FERRITIN: CPT | Performed by: PHYSICIAN ASSISTANT

## 2025-01-08 RX ADMIN — ACETAMINOPHEN 650 MG: 325 TABLET, FILM COATED ORAL at 02:22

## 2025-01-08 RX ADMIN — ACETAMINOPHEN 650 MG: 325 TABLET, FILM COATED ORAL at 20:23

## 2025-01-08 RX ADMIN — FLUTICASONE PROPIONATE 1 SPRAY: 50 SPRAY, METERED NASAL at 10:28

## 2025-01-08 RX ADMIN — LIDOCAINE HYDROCHLORIDE 10 ML: 20 SOLUTION ORAL; TOPICAL at 17:11

## 2025-01-08 RX ADMIN — LIDOCAINE HYDROCHLORIDE 10 ML: 20 SOLUTION ORAL; TOPICAL at 02:22

## 2025-01-08 RX ADMIN — LIDOCAINE HYDROCHLORIDE 10 ML: 20 SOLUTION ORAL; TOPICAL at 10:30

## 2025-01-08 RX ADMIN — ACETAMINOPHEN 650 MG: 325 TABLET, FILM COATED ORAL at 12:03

## 2025-01-08 NOTE — ASSESSMENT & PLAN NOTE
Reportedly had episode of A-fib with RVR with outpatient PCP visit however currently EKG noted with normal sinus rhythm.  Further care per cardiology recommendation.

## 2025-01-08 NOTE — ASSESSMENT & PLAN NOTE
Lab Results   Component Value Date    EGFR 56 01/08/2025    EGFR 44 01/07/2025    EGFR 79 03/28/2023    CREATININE 0.93 01/08/2025    CREATININE 1.14 01/07/2025    CREATININE 0.71 03/28/2023   Management per primary team.

## 2025-01-08 NOTE — CONSULTS
e-Consult ([facility])  - Interventional Radiology  Bart Jasso 84 y.o. female MRN: [id number]  Unit/Bed#: 2 E 272-01 Encounter: [id number]      Interventional Radiology has been consulted to evaluate Bart Jasso    We were consulted by internal medicine concerning this patient with pancytopenia.    Inpatient Consult to IR  Consult performed by: MAKAYLA Frausto  Consult ordered by: Garret NIETO MD        01/08/25    Assessment/Recommendation:     84-year-old Georgian-speaking female presenting to the emergency department with complaints of mouth pain worsening with swallowing and movement of tongue and lips x 2 weeks.    Review of medical record, no significant past medical history noted.    Workup/evaluation in the emergency department patient noted to have troponin of 120 with no acute abnormalities on EKG.  Also, on admission, she was noted to have a platelet count of 33,000; platelet count has decreased to 26,000 today, 1/8/2025.    Interventional radiologist been consulted for further evaluation of pancytopenia with bone marrow biopsy.    Component      Latest Ref Rng 1/8/2025   WBC      4.31 - 10.16 Thousand/uL 2.26 (L)    RBC      3.81 - 5.12 Million/uL 3.53 (L)       Component      Latest Ref Rng 1/8/2025   Platelet Count      149 - 390 Thousands/uL 26 (L)       Reviewed laboratory findings  Plan for IR bone marrow biopsy probable tomorrow, 1/9/2025.  Time to be determined per IR availability  NPO after midnight 1/9/2025  Remainder of care per primary care service team  Please do not hesitate to contact interventional radiology for questions/concerns    11-20 minutes, >50% of the total time devoted to medical consultative verbal/EMR discussion between providers. Written report will be generated in the EMR.     Thank you for allowing Interventional Radiology to participate in the care of Bart Jasso.     MAKAYLA Frausto

## 2025-01-08 NOTE — ASSESSMENT & PLAN NOTE
ulcerations involving lips and soft palate  R/o viral etiology  HHSV and CMV culture   Serum CMV studies    GI consulted   Recommend ID consult as above    WDL

## 2025-01-08 NOTE — PLAN OF CARE
Problem: PAIN - ADULT  Goal: Verbalizes/displays adequate comfort level or baseline comfort level  Description: Interventions:  - Encourage patient to monitor pain and request assistance  - Assess pain using appropriate pain scale  - Administer analgesics based on type and severity of pain and evaluate response  - Implement non-pharmacological measures as appropriate and evaluate response  - Consider cultural and social influences on pain and pain management  - Notify physician/advanced practitioner if interventions unsuccessful or patient reports new pain  Outcome: Progressing     Problem: INFECTION - ADULT  Goal: Absence or prevention of progression during hospitalization  Description: INTERVENTIONS:  - Assess and monitor for signs and symptoms of infection  - Monitor lab/diagnostic results  - Monitor all insertion sites, i.e. indwelling lines, tubes, and drains  - Monitor endotracheal if appropriate and nasal secretions for changes in amount and color  - [place] appropriate cooling/warming therapies per order  - Administer medications as ordered  - Instruct and encourage patient and family to use good hand hygiene technique  - Identify and instruct in appropriate isolation precautions for identified infection/condition  Outcome: Progressing     Problem: SAFETY ADULT  Goal: Patient will remain free of falls  Description: INTERVENTIONS:  - Educate patient/family on patient safety including physical limitations  - Instruct patient to call for assistance with activity   - Consult OT/PT to assist with strengthening/mobility   - Keep Call bell within reach  - Keep bed low and locked with side rails adjusted as appropriate  - Keep care items and personal belongings within reach  - Initiate and maintain comfort rounds  - Make Fall Risk Sign visible to staff  - Offer Toileting every  Hours, in advance of need  - Initiate/Maintain alarm  - Obtain necessary fall risk management equipment:   - Apply yellow socks and  OFFICE NOTE    10/13/22  Name: Joie Marley  GYO:[birth year]   Sex:male   Age:73 y.o. SUBJECTIVE  Chief Complaint   Patient presents with    Tingling     Bilateral hands     Other     Losing strength in the hands     Urinary Frequency       HPI Aaron Shiela continues to report depressive symptoms of anhedonia, early AM awakening, anger. No longer seeing Jaime Marie who seemed to help him. Some of his symptoms sound like lumbar spinal stenosis. Shots not much help in past, details not available. Review of Systems   Constitutional:  Positive for activity change and fatigue. Negative for appetite change, fever and unexpected weight change. HENT:  Negative for congestion, ear pain, hearing loss, sinus pain, sore throat and trouble swallowing. Eyes:  Negative for photophobia, redness and visual disturbance. Respiratory:  Negative for cough, shortness of breath and wheezing. Cardiovascular:  Negative for chest pain, palpitations and leg swelling. Gastrointestinal:  Negative for abdominal pain, blood in stool, constipation, diarrhea and vomiting. Endocrine: Negative for cold intolerance, polydipsia and polyuria. Genitourinary:  Positive for frequency and urgency. Negative for difficulty urinating, genital sores and hematuria. Musculoskeletal:  Positive for arthralgias, back pain and gait problem. Negative for joint swelling. Skin:  Negative for pallor and rash. Allergic/Immunologic: Negative for food allergies. Neurological:  Negative for dizziness, tremors, seizures, syncope, numbness and headaches. Hematological:  Negative for adenopathy. Does not bruise/bleed easily. Psychiatric/Behavioral:  Positive for dysphoric mood and sleep disturbance. Negative for agitation and hallucinations. The patient is nervous/anxious. All other systems reviewed and are negative.          Current Outpatient Medications:     celecoxib (CELEBREX) 200 MG capsule, Take 1 capsule by mouth daily, Disp: 90 bracelet for high fall risk patients  - Consider moving patient to room near nurses station  Outcome: Progressing  Goal: Maintain or return to baseline ADL function  Description: INTERVENTIONS:  -  Assess patient's ability to carry out ADLs; assess patient's baseline for ADL function and identify physical deficits which impact ability to perform ADLs (bathing, care of mouth/teeth, toileting, grooming, dressing, etc.)  - Assess/evaluate cause of self-care deficits   - Assess range of motion  - Assess patient's mobility; develop plan if impaired  - Assess patient's need for assistive devices and provide as appropriate  - Encourage maximum independence but intervene and supervise when necessary  - Involve family in performance of ADLs  - Assess for home care needs following discharge   - Consider OT consult to assist with ADL evaluation and planning for discharge  - Provide patient education as appropriate  Outcome: Progressing  Goal: Maintains/Returns to pre admission functional level  Description: INTERVENTIONS:  - Perform AM-PAC 6 Click Basic Mobility/ Daily Activity assessment daily.  - Set and communicate daily mobility goal to care team and patient/family/caregiver.   - Collaborate with rehabilitation services on mobility goals if consulted  - Perform Range of Motion  times a day.  - Reposition patient every hours.  - Dangle patient  times a day  - Stand patient  times a day  - Ambulate patient  times a day  - Out of bed to chair  times a day   - Out of bed for m times a day  - Out of bed for toileting  - Record patient progress and toleration of activity level   Outcome: Progressing     Problem: DISCHARGE PLANNING  Goal: Discharge to home or other facility with appropriate resources  Description: INTERVENTIONS:  - Identify barriers to discharge w/patient and caregiver  - Arrange for needed discharge resources and transportation as appropriate  - Identify discharge learning needs (meds, wound care, etc.)  -  capsule, Rfl: 1    tamsulosin (FLOMAX) 0.4 MG capsule, Take 2 capsules by mouth daily With food, Disp: 180 capsule, Rfl: 1    DULoxetine (CYMBALTA) 30 MG extended release capsule, Take one capsule daily in 2 weeks can increase to 2 capsules daily and remain on this dose, Disp: 60 capsule, Rfl: 5    Red Yeast Rice 600 MG CAPS, Take by mouth, Disp: , Rfl:     Multiple Vitamins-Minerals (CENTRUM SILVER 50+MEN) TABS, Take by mouth, Disp: , Rfl:     tiZANidine (ZANAFLEX) 4 MG tablet, Take 1 tablet by mouth nightly as needed (back pain) (Patient not taking: Reported on 10/13/2022), Disp: 10 tablet, Rfl: 0    DULoxetine (CYMBALTA) 30 MG extended release capsule, Take 1 capsule by mouth daily (Patient not taking: Reported on 10/13/2022), Disp: 30 capsule, Rfl: 5    ibuprofen (ADVIL;MOTRIN) 400 MG tablet, Take 400 mg by mouth every 6 hours as needed for Pain  (Patient not taking: Reported on 10/13/2022), Disp: , Rfl:   Allergies   Allergen Reactions    Atorvastatin Calcium Other (See Comments)     Leg cramps      Ezetimibe-Simvastatin Other (See Comments)     Leg pain/cramping       No past medical history on file. No past surgical history on file. No family history on file. Social History       Tobacco History       Smoking Status  Some Days Smoking Tobacco Type  Cigarettes, Cigars      Smokeless Tobacco Use  Never              Alcohol History       Alcohol Use Status  Not Currently              Drug Use       Drug Use Status  Never              Sexual Activity       Sexually Active  Not Currently Partners  Female                    OBJECTIVE  Vitals:    10/13/22 0925   BP: 138/80   Pulse: 68   Resp: 17   Temp: 97.6 °F (36.4 °C)   TempSrc: Temporal   SpO2: 97%   Weight: 232 lb 9.6 oz (105.5 kg)   Height: 5' 9\" (1.753 m)        Body mass index is 34.35 kg/m².     Orders Placed This Encounter   Procedures    Influenza, FLUAD, (age 72 y+), IM, Preservative Free, 0.5 mL    CBC with Auto Differential     Standing Status: Arrange for interpretive services to assist at discharge as needed  - Refer to Case Management Department for coordinating discharge planning if the patient needs post-hospital services based on physician/advanced practitioner order or complex needs related to functional status, cognitive ability, or social support system  Outcome: Progressing     Problem: Knowledge Deficit  Goal: Patient/family/caregiver demonstrates understanding of disease process, treatment plan, medications, and discharge instructions  Description: Complete learning assessment and assess knowledge base.  Interventions:  - Provide teaching at level of understanding  - Provide teaching via preferred learning methods  Outcome: Progressing      Future     Standing Expiration Date:   10/13/2023    Comprehensive Metabolic Panel     Standing Status:   Future     Standing Expiration Date:   10/13/2023    Lipid Panel     Standing Status:   Future     Standing Expiration Date:   10/13/2023    TSH     Standing Status:   Future     Standing Expiration Date:   10/13/2023    Urinalysis     Standing Status:   Future     Standing Expiration Date:   10/13/2023    PSA Screening     Standing Status:   Future     Standing Expiration Date:   10/13/2023    External Referral To Urology     Referral Priority:   Routine     Referral Type:   Eval and Treat     Referral Reason:   Specialty Services Required     Referred to Provider:   Berna Avery MD     Requested Specialty:   Urology     Number of Visits Requested:   1    External Referral To Pain Clinic     Referral Priority:   Routine     Referral Type:   Eval and Treat     Referral Reason:   Specialty Services Required     Referred to Provider:   Belle Chen MD     Requested Specialty:   Pain Management     Number of Visits Requested:   1    POCT Urinalysis no Micro        EXAM   Physical Exam  Vitals and nursing note reviewed. Constitutional:       Appearance: Normal appearance. He is obese. HENT:      Right Ear: Tympanic membrane and external ear normal.      Left Ear: Tympanic membrane and external ear normal.      Nose: No congestion or rhinorrhea. Mouth/Throat:      Pharynx: Oropharynx is clear. No posterior oropharyngeal erythema. Eyes:      Conjunctiva/sclera: Conjunctivae normal.   Cardiovascular:      Rate and Rhythm: Normal rate and regular rhythm. Heart sounds: No murmur heard. Pulmonary:      Effort: Pulmonary effort is normal.      Breath sounds: Normal breath sounds. Abdominal:      General: Bowel sounds are normal.      Palpations: There is no mass. Tenderness: There is no abdominal tenderness. Musculoskeletal:         General: No swelling or tenderness.       Cervical back: No tenderness. Right lower leg: No edema. Left lower leg: No edema. Lymphadenopathy:      Cervical: No cervical adenopathy. Skin:     Coloration: Skin is not jaundiced. Findings: No bruising or rash. Neurological:      Mental Status: He is alert and oriented to person, place, and time. Sensory: Sensory deficit present. Motor: Weakness present. Coordination: Coordination normal.      Gait: Gait normal.      Comments: Reporting bilateral ulnar neuropathy   Psychiatric:         Behavior: Behavior normal.      Comments: Poor eye contact. Seems discouraged         Jaclyn Armas was seen today for tingling, other and urinary frequency. Diagnoses and all orders for this visit:    Frequent urination  -     POCT Urinalysis no Micro  -     External Referral To Urology  Lost track of Dr. Narcisa Tejada who follows him for this will resubmit  referral  Primary osteoarthritis of both elbows  -     celecoxib (CELEBREX) 200 MG capsule; Take 1 capsule by mouth daily  -     CBC with Auto Differential; Future  -     Comprehensive Metabolic Panel; Future  -     Urinalysis; Future    Benign prostatic hyperplasia with urinary frequency  -     tamsulosin (FLOMAX) 0.4 MG capsule; Take 2 capsules by mouth daily With food  -     External Referral To Urology    Immunization due  -     Influenza, FLUAD, (age 72 y+), IM, Preservative Free, 0.5 mL    Other hyperlipidemia  -     Lipid Panel; Future  -     TSH; Future    Screening for prostate cancer  -     PSA Screening; Future    Current mild episode of major depressive disorder, unspecified whether recurrent (HCC)  -     DULoxetine (CYMBALTA) 30 MG extended release capsule; Take one capsule daily in 2 weeks can increase to 2 capsules daily and remain on this dose  May help him some for neuropathic pain.  We have tried multiple times to get him to take antidepressant meds, will try once more, am not optimistic  Ulnar neuropathy of both upper extremities  - External Referral To Pain Clinic    Spinal stenosis of lumbar region, unspecified whether neurogenic claudication present  -     External Referral To Pain Clinic  Suspect he has this      No follow-ups on file.     Electronically signed by Mavis Gloria MD on 10/13/22 at 10:35 AM EDT

## 2025-01-08 NOTE — ASSESSMENT & PLAN NOTE
Lab Results   Component Value Date    EGFR 56 01/08/2025    EGFR 44 01/07/2025    EGFR 79 03/28/2023    CREATININE 0.93 01/08/2025    CREATININE 1.14 01/07/2025    CREATININE 0.71 03/28/2023

## 2025-01-08 NOTE — CONSULTS
Consultation - Gastroenterology   Name: Bart Jasso 84 y.o. female I MRN: [id number]  Unit/Bed#: 2 E 272-01 I Date of Admission: 1/7/2025   Date of Service: 1/8/2025 I Hospital Day: 0   Consults  Physician Requesting Evaluation: Garret NIETO MD   Reason for Evaluation / Principal Problem: mouth pain    Assessment & Plan  Pancytopenia (HCC)    Elevated troponin    Cardiac arrhythmia    Mouth pain  - She has ulcers on the roof of her mouth with active bleeding and pain, suspicious for a viral process  - Rec cultures for HSV/CMV; consider ID or ENT consultation pending cultures  - Agree with magic mouthwash and supportive care  - IVF for hydration while not taking PO  - No indication for EGD given clear ulcerations in the mouth and her new onset thrombocytopenia  Thrombocytopenia (HCC)  - She has new thrombocytopenia compared to 2023 likely causing the bleeding  - Hematology consulted, consider bone marrow biopsy given pancytopenia    The patient will be seen by Dr. Zhu.    History of Present Illness   HPI:  Bart Jasso is a 84 y.o. female with no diagnosed PMH, who presented yesterday due to mouth pain with ulcers and bleeding. She is Somali speaking primarily so her grandson is at bedside translation. These symptoms started on 12/28. She came here from [facility] on 12/18 on a [place]. Prior to 12/28, was eating fine without any mouth pain or issues. She was having issues with a cough last May but no other reported illnesses or viruses recently. She also had Afib with RVR at a PCP office yesterday so she is undergoing workup for this.     Review of Systems  I have reviewed the patient's PMH, PSH, Social History, Family History, Meds, and Allergies  Historical Information   History reviewed. No pertinent past medical history.  History reviewed. No pertinent surgical history.  Social History     Tobacco Use    Smoking status: Never     Passive exposure: Never    Smokeless tobacco: Never   Substance  and Sexual Activity    Alcohol use: Not Currently    Drug use: Never    Sexual activity: Not Currently     E-Cigarette/Vaping     E-Cigarette/Vaping Substances     Family history non-contributory  Social History     Tobacco Use    Smoking status: Never     Passive exposure: Never    Smokeless tobacco: Never   Substance and Sexual Activity    Alcohol use: Not Currently    Drug use: Never    Sexual activity: Not Currently     Prior to Admission Medications   Prescriptions Last Dose Informant Patient Reported? Taking?   Lidocaine Viscous HCl (XYLOCAINE) 2 % mucosal solution   No No   Sig: Swish and spit 15 mL 4 (four) times a day as needed for mouth pain or discomfort   albuterol (Ventolin HFA) 90 mcg/act inhaler   No No   Sig: Inhale 2 puffs every 6 (six) hours as needed for wheezing   amoxicillin (AMOXIL) 400 MG/5ML suspension   No No   Sig: Take 10.9 mL (875 mg total) by mouth 2 (two) times a day for 10 days   fluticasone (FLONASE) 50 mcg/act nasal spray   No No   Si spray into each nostril daily   meloxicam (Mobic) 15 mg tablet   No No   Sig: Take 1 tablet (15 mg total) by mouth daily   promethazine-dextromethorphan (PHENERGAN-DM) 6.25-15 mg/5 mL oral syrup   No No   Sig: Take 5 mL by mouth 4 (four) times a day as needed for cough      Facility-Administered Medications: None       Objective :  Temp:  [98.1 °F (36.7 °C)-98.7 °F (37.1 °C)] 98.7 °F (37.1 °C)  HR:  [65-74] 74  BP: (101-130)/(45-60) 112/47  Resp:  [16-20] 16  SpO2:  [94 %-96 %] 94 %  O2 Device: None (Room air)    Physical Exam  General- Appears stated age, mouth with ulcers on the upper palate with oozing blood, dried blood throughout mouth      Lab Results: I have reviewed the following results:CBC/BMP:   .     25  0443   WBC 2.26*   HGB 10.2*   HCT 30.7*   PLT 26*   SODIUM 140   K 4.1   *   CO2 27   BUN 27*   CREATININE 0.93   GLUC 105    , Creatinine Clearance: Estimated Creatinine Clearance: 38.9 mL/min (by C-G formula based on SCr  of 0.93 mg/dL)., LFTs: No new results in last 24 hours. , PTT/INR:No new results in last 24 hours.     Imaging Results Review: No pertinent imaging studies reviewed.  Other Study Results Review: No additional pertinent studies reviewed.

## 2025-01-08 NOTE — CASE MANAGEMENT
Case Management Assessment & Discharge Planning Note    Patient name Bart Jasso  Location [address] [address]/2 E 272-01 MRN [id number]  : 1940 Date 2025       Current Admission Date: 2025  Current Admission Diagnosis:Pancytopenia (HCC)   Patient Active Problem List    Diagnosis Date Noted Date Diagnosed    Pancytopenia (HCC) 2025     CKD (chronic kidney disease) stage 3, GFR 30-59 ml/min (HCC) 2025     Elevated troponin 2025     Cardiac arrhythmia 2025     Mouth pain 2025     Thrombocytopenia (HCC) 2025     Chronic pain of both knees 2023       LOS (days): 0  Geometric Mean LOS (GMLOS) (days):   Days to GMLOS:     OBJECTIVE:              Current admission status: Inpatient       Preferred Pharmacy:   [facility]/pharmacy #[id number] - [address] [address]  Phone: [phone] Fax: [phone]    Primary Care Provider: MAKAYLA Chapman    Primary Insurance: NOLAN SAMSON PENDING  Secondary Insurance:     ASSESSMENT:  Active Health Care Proxies    There are no active Health Care Proxies on file.       Advance Directives  Does patient have a Health Care POA?: No  Does patient currently have a Health Care decision maker?: No  Does patient have Advance Directives?: No  Primary Contact: Maxime Collier/[place] ([phone])    Readmission Root Cause  30 Day Readmission: No    Patient Information  Admitted from:: Home  Mental Status: Alert  During Assessment patient was accompanied by: Other-Comment (Grandson)  Assessment information provided by:: Other - please comment (Grandson)  Primary Caregiver: Self  Support Systems: Daughter, Family members  Home entry access options. Select all that apply.: Stairs  Number of steps to enter home.: 2  Type of Current Residence: 2 story home  Living Arrangements: Lives w/ Daughter    Activities of Daily Living Prior to Admission  Functional Status:  Independent  Completes ADLs independently?: Yes  Ambulates independently?: Yes  Does patient use assisted devices?: No  Does patient currently own DME?: No  Does patient have a history of Outpatient Therapy (PT/OT)?: No  Does the patient have a history of Short-Term Rehab?: No  Does patient have a history of HHC?: No  Does patient currently have HHC?: No    Patient Information Continued  Does patient have prescription coverage?: No  Does patient have a history of substance abuse?: No  Does patient have a history of Mental Health Diagnosis?: No    Means of Transportation  Means of Transport to Appts:: Family transport    DISCHARGE DETAILS:    Discharge planning discussed with:: Pt's grandson     CM contacted family/caregiver?: Yes (Family at bedside during assessment)  Were Treatment Team discharge recommendations reviewed with patient/caregiver?: Yes  Did patient/caregiver verbalize understanding of patient care needs?: Yes  Were patient/caregiver advised of the risks associated with not following Treatment Team discharge recommendations?: Yes    Contacts  Patient Contacts: Maxime Collier/Liz  Relationship to Patient:: Family  Contact Method: In Person  Reason/Outcome: Continuity of Care, Emergency Contact, Discharge Planning    Additional Comments: CM met with pt, pt's grandson, and multiple other family members at bedside. CM offered to use  service however pt's grandson reports that pt understands Pashto but speaks a very specific dialect and offered to translate for pt. Pt's [facility] and has been visiting with family since December. Pt is staying with her daughter in a 2 story house. Pt is reportedly independent with ADLs and ambulation, no use of DME. Pt is listed as MA Pending at this time. No discharge concerns or needs expressed or identified at this time, CM department to remain available.

## 2025-01-08 NOTE — CONSULTS
Consultation - Cardiology   Bart Jasso 84 y.o. female MRN: [id number]  Unit/Bed#: 2 E 272-01 Encounter: [id number]  01/08/25  2:45 PM    Assessment/ Plan:   Assessment & Plan  Paroxysmal atrial fibrillation (HCC)  She is maintaining predominantly sinus rhythm with brief episodes of PAF on telemetry.  Suspect secondary to recent poor p.o. intake and possible underlying viral infection.  Echocardiogram pending  Will hold off on initiation of rate control given adequate rate control and soft BPs.  Will hold off on initiation of anticoagulation given pancytopenia and plans for potential bone marrow biopsy tomorrow per heme-onc.  Plan to discuss consideration of anticoagulation with the patient and family after procedures.  [id number] 3  Continue telemetry    Elevated troponin  EKG without acute ischemic changes.  Denies anginal symptoms.  Likely nonischemic myocardial injury in the setting of paroxysmal atrial fibrillation with RVR, and possible underlying infection.    Pancytopenia (HCC)  Management per heme-onc.  Per discussion with heme-onc, suspected secondary to viral infection, with plans for bone marrow biopsy potentially tomorrow.    Mouth pain  Management per primary team/heme-onc    CKD (chronic kidney disease) stage 3, GFR 30-59 ml/min ([facility])  Lab Results   Component Value Date    EGFR 56 01/08/2025    EGFR 44 01/07/2025    EGFR 79 03/28/2023    CREATININE 0.93 01/08/2025    CREATININE 1.14 01/07/2025    CREATININE 0.71 03/28/2023   Management per primary team.        History of Present Illness   Physician Requesting Consult: Garret NIETO MD    Reason for Consult / Principal Problem: Atrial fibrillation, elevated troponin    HPI: Bart Jasso is a 84 y.o. year old female with no significant known past medical history who presents with mouth pain and sores, and concern for atrial fibrillation.    Patient's family assisted in providing history and translation.  She saw her PCP  yesterday for mouth discomfort and sores, with poor p.o. intake.  EKG at her PCPs office demonstrated new onset atrial fibrillation.  Patient was advised to proceed to the ER for further evaluation.    Her EKG in the ED demonstrated sinus rhythm.  She was found to have elevated troponin: [phone].  Her GFR was found to fall in the CKD 3 range.  She is found to have leukopenia leukopenia and panel and thrombocytopenia.  She was noted to have mild anemia with hemoglobin of 11.4 yesterday, 10.2 today.  CTA demonstrated cardiomegaly, no acute PE.  Echocardiogram pending.  She has not been initiated on anticoagulation.    She denies chest pain, shortness of breath, orthopnea, palpitations, lightheadedness, syncope.  She denies bleeding history.  She denies fevers, endorses chills.    She denies history of heart disease, heart attacks, strokes, or hospitalization for heart problems.            Inpatient consult to Cardiology  Consult performed by: Yovana Gtz PA-C  Consult ordered by: Garret NIETO MD        EKG: Sinus rhythm, left axis deviation.  EKG in PCPs office from 1/7/2025 consistent with atrial fibrillation.  Tele: Predominantly sinus rhythm with occasional brief episodes of paroxysmal atrial fibrillation.    Review of Systems   Constitutional:  Positive for chills. Negative for diaphoresis, fever and unexpected weight change.   HENT:  Positive for mouth sores. Negative for ear pain.    Eyes:  Negative for pain and redness.   Respiratory:  Negative for cough and shortness of breath.    Cardiovascular:  Negative for chest pain, palpitations and leg swelling.   Gastrointestinal:  Negative for abdominal pain, blood in stool and vomiting.   Genitourinary:  Negative for dysuria and hematuria.   Skin:  Negative for color change and rash.   Neurological:  Negative for dizziness, syncope and light-headedness.   Hematological:  Does not bruise/bleed easily.   Psychiatric/Behavioral:  Negative for  "agitation and behavioral problems.        Historical Information   History reviewed. No pertinent past medical history.  History reviewed. No pertinent surgical history.  Social History     Substance and Sexual Activity   Alcohol Use Not Currently     Social History     Substance and Sexual Activity   Drug Use Never     Social History     Tobacco Use   Smoking Status Never    Passive exposure: Never   Smokeless Tobacco Never       Family History: History reviewed. No pertinent family history.    Meds/Allergies   all current active meds have been reviewed  No Known Allergies    Objective   Vitals: Blood pressure (!) 112/47, pulse 74, temperature 98.7 °F (37.1 °C), temperature source Oral, resp. rate 16, height 5' 4\" (1.626 m), weight 60.3 kg (133 lb), SpO2 94%., Body mass index is 22.83 kg/m².,   Orthostatic Blood Pressures      Flowsheet Row Most Recent Value   Blood Pressure 112/47 filed at 2025 1439   Patient Position - Orthostatic VS Sitting filed at 2025 0700            Systolic (24hrs), Av , Min:101 , Max:130     Diastolic (24hrs), Av, Min:45, Max:63        Intake/Output Summary (Last 24 hours) at 2025 1445  Last data filed at 2025 0900  Gross per 24 hour   Intake 240 ml   Output 500 ml   Net -260 ml       Invasive Devices       Peripheral Intravenous Line  Duration             Peripheral IV 25 Right Antecubital 1 day                        Physical Exam:    GEN: Awake, in no acute distress.  Ill-appearing.  HEENT: Sclera anicteric, conjunctivae pink.  Mouth erythema and sores noted.  NECK: Supple, no significant JVD. Trachea midline.   HEART: Regular rhythm, normal S1 and S2, no murmurs, clicks, gallops or rubs. PMI nondisplaced, no thrills.   LUNGS: Clear to auscultation bilaterally; no wheezes, rales, or rhonchi. No increased work of breathing or signs of respiratory distress.   ABDOMEN: Soft, nontender, non-distended.   EXTREMITIES: Skin warm and well perfused, no " clubbing, cyanosis, or edema.  SKIN: Normal without suspicious lesions on exposed skin.      Lab Results:     Troponins:   Results from last 7 days   Lab Units 01/08/25  1338 01/07/25  2013 01/07/25  1612 01/07/25  1357   HS TNI 0HR ng/L 92*  --   --  120*   HS TNI 2HR ng/L  --   --  136*  --    HS TNI 4HR ng/L  --  174*  --   --    HSTNI D4 ng/L  --  54*  --   --        CBC with diff:   Results from last 7 days   Lab Units 01/08/25 0443 01/07/25  1357   WBC Thousand/uL 2.26* 3.19*   HEMOGLOBIN g/dL 10.2* 11.4*   HEMATOCRIT % 30.7* 35.1   MCV fL 87 88   PLATELETS Thousands/uL 26* 33*   RBC Million/uL 3.53* 4.01   MCH pg 28.9 28.4   MCHC g/dL 33.2 32.5   RDW % 13.9 13.8   MPV fL 12.0 11.1   NRBC AUTO /100 WBCs 0 0         CMP:   Results from last 7 days   Lab Units 01/08/25 0443 01/07/25  1357   POTASSIUM mmol/L 4.1 4.4   CHLORIDE mmol/L 109* 107   CO2 mmol/L 27 26   BUN mg/dL 27* 26*   CREATININE mg/dL 0.93 1.14   CALCIUM mg/dL 8.1* 8.7   AST U/L  --  12*   ALT U/L  --  14   ALK PHOS U/L  --  68   EGFR ml/min/1.73sq m 56 44       ** Please Note: Fluency DirectDictation voice to text software may have been used in the creation of this document. **

## 2025-01-08 NOTE — ASSESSMENT & PLAN NOTE
- She has ulcers on the roof of her mouth with active bleeding and pain, suspicious for a viral process  - Rec cultures for HSV/CMV; consider ID or ENT consultation pending cultures  - Agree with magic mouthwash and supportive care  - IVF for hydration while not taking PO  - No indication for EGD given clear ulcerations in the mouth and her new onset thrombocytopenia

## 2025-01-08 NOTE — UTILIZATION REVIEW
Initial Clinical Review    OBSERVATION  1/7  CHANGED TO IP ADMISSIOn 1/8  @  1500    Admission: Date/Time/Statement:   Admission Orders (From admission, onward)       Ordered        01/07/25 1543  Place in Observation  Once                          1/08/25 1500  INPATIENT ADMISSION  Once        Transfer Service: Hospitalist   Question Answer Comment   Level of Care Med Surg    Estimated length of stay More than 2 Midnights    Certification I certify that inpatient services are medically necessary for this patient for a duration of greater than two midnights. See H&P and MD Progress Notes for additional information about the patient's course of treatment.        01/08/25 1500       ED Arrival Information       Expected   1/7/2025     Arrival   1/7/2025 12:53    Acuity   Urgent              Means of arrival   Walk-In    Escorted by   Family Member    Service   Hospitalist    Admission type   Emergency              Arrival complaint   Mouth sores             Chief Complaint   Patient presents with    Mouth Lesions     Pt sent from PCP for sore throat and sores in her mouth for the past 15 days.        Initial Presentation: 84 y.o. female presents to ED from PCP office   with mouth pain for past  few days and poor  oral intake.  No PMH.    Afib  noted at  PCP office.  NSR  in ED.   Labs  show platelets   33.   Troponin   120.  Admit  Observation  with  Elevated troponin, Cardiac  arrhythmia, mouth pain and thrombocytopenia and plan is  monitor labs,  tele, possibly  cardiology consult, 2 DE, possible   stress test, trend troponin  and magic mouthwash, no sign of infection.    1/8   IP  admission  GI  consult  Ulcers  on roof of mouth with active bleeding and pain, suspect viral process.  F/U cultures,  may need  ENT  or  ID  consult.  Not really taking po.  No indication for  EGD.      BM BX   planned   Thurs 1/9 in     ED Treatment-Medication Administration from 01/07/2025 1231 to 01/07/2025 1806         Date/Time  Order Dose Route Action     01/07/2025 1531 iohexol (OMNIPAQUE) 350 MG/ML injection (MULTI-DOSE) 80 mL 80 mL Intravenous Given     01/07/2025 1600 acetaminophen (TYLENOL) tablet 650 mg 650 mg Oral Given     01/07/2025 1600 diphenhydramine, lidocaine, Al/Mg hydroxide, simethicone (Magic Mouthwash) oral solution 10 mL 10 mL Swish & Spit Given     01/07/2025 1600 albuterol (PROVENTIL HFA,VENTOLIN HFA) inhaler 2 puff 2 puff Inhalation Given     01/07/2025 1601 fluticasone (FLONASE) 50 mcg/act nasal spray 1 spray 1 spray Nasal Given     01/07/2025 1738 aspirin chewable tablet 324 mg 324 mg Oral Given            Scheduled Medications:  fluticasone, 1 spray, Nasal, Daily      Continuous IV Infusions:     PRN Meds:  acetaminophen, 650 mg, Oral, Q6H PRN  albuterol, 2 puff, Inhalation, Q6H PRN  diphenhydramine, lidocaine, Al/Mg hydroxide, simethicone, 10 mL, Swish & Spit, Q4H PRN      ED Triage Vitals   Temperature Pulse Respirations Blood Pressure SpO2 Pain Score   01/07/25 1314 01/07/25 1314 01/07/25 1314 01/07/25 1314 01/07/25 1314 01/07/25 1600   98.6 °F (37 °C) 83 20 100/50 96 % 10 - Worst Possible Pain     Weight (last 2 days)       Date/Time Weight    01/07/25 1700 60.6 (133.6)            Vital Signs (last 3 days)       Date/Time Temp Pulse Resp BP MAP (mmHg) SpO2 O2 Device Patient Position - Orthostatic VS Pain    01/08/25 0700 98.7 °F (37.1 °C) -- 16 125/55 78 -- None (Room air) Sitting --    01/08/25 0300 98.3 °F (36.8 °C) -- 16 107/45 66 -- None (Room air) Lying --    01/08/25 0222 -- -- -- -- -- -- -- -- 4    01/08/25 02:17:01 98.1 °F (36.7 °C) 73 17 122/46 71 94 % None (Room air) Lying --    01/07/25 22:39:26 98.5 °F (36.9 °C) 67 16 119/56 77 95 % -- -- --    01/07/25 20:00:53 -- 65 -- -- -- 96 % -- -- --    01/07/25 1900 98.6 °F (37 °C) -- -- 110/55 73 -- -- -- --    01/07/25 18:12:03 98.5 °F (36.9 °C) 69 20 101/52 68 94 % -- -- --    01/07/25 1631 -- -- -- -- -- -- -- -- 5    01/07/25 1600 -- 71 16 115/56 80 96  % None (Room air) Lying 10 - Worst Possible Pain    01/07/25 1500 -- 72 17 116/63 -- 96 % None (Room air) Sitting --    01/07/25 1314 98.6 °F (37 °C) 83 20 100/50 -- 96 % None (Room air) Sitting --              Pertinent Labs/Diagnostic Test Results:   Radiology:  CTA chest pe study   Final Interpretation by Samuel Renteria MD (01/07 1631)         1. Right lower lobe bronchial inflammation. No airspace consolidation.   2. No evidence of acute pulmonary embolus.                  Workstation performed: [id number]         XR chest pa and lateral   Final Interpretation by Lane Uriarte MD (01/07 1438)      No acute cardiopulmonary disease.            Workstation performed: [id number]           Cardiology:  ECG 12 lead    by Interface, Ris Results In (01/07 1420)        EKG  NSR       Results from last 7 days   Lab Units 01/08/25  0443 01/07/25  1357   WBC Thousand/uL 2.26* 3.19*   HEMOGLOBIN g/dL 10.2* 11.4*   HEMATOCRIT % 30.7* 35.1   PLATELETS Thousands/uL 26* 33*   TOTAL NEUT ABS Thousands/µL 0.66* 1.38*     Results from last 7 days   Lab Units 01/07/25  1357   RETIC CT ABS  14,000*   RETIC CT PCT % 0.35*     Results from last 7 days   Lab Units 01/08/25  0443 01/07/25  1357   SODIUM mmol/L 140 140   POTASSIUM mmol/L 4.1 4.4   CHLORIDE mmol/L 109* 107   CO2 mmol/L 27 26   ANION GAP mmol/L 4 7   BUN mg/dL 27* 26*   CREATININE mg/dL 0.93 1.14   EGFR ml/min/1.73sq m 56 44   CALCIUM mg/dL 8.1* 8.7     Results from last 7 days   Lab Units 01/07/25  1357   AST U/L 12*   ALT U/L 14   ALK PHOS U/L 68   TOTAL PROTEIN g/dL 6.1*   ALBUMIN g/dL 3.7   TOTAL BILIRUBIN mg/dL 0.89         Results from last 7 days   Lab Units 01/08/25  0443 01/07/25  1357   GLUCOSE RANDOM mg/dL 105 114           Results from last 7 days   Lab Units 01/07/25  2013 01/07/25  1612 01/07/25  1357   HS TNI 0HR ng/L  --   --  120*   HS TNI 2HR ng/L  --  136*  --    HSTNI D2 ng/L  --  16  --    HS TNI 4HR ng/L 174*  --   --    HSTNI D4 ng/L  54*  --   --          Results from last 7 days   Lab Units 01/07/25  1357   PROTIME seconds 15.2*   INR  1.13   PTT seconds 29         Results from last 7 days   Lab Units 01/07/25  1357   PROCALCITONIN ng/ml <0.05     Results from last 7 days   Lab Units 01/07/25  1357   LACTIC ACID mmol/L 1.1             Results from last 7 days   Lab Units 01/07/25  1357   BNP pg/mL 372*           Results from last 7 days   Lab Units 01/07/25  2059   CLARITY UA  Clear   COLOR UA  Yellow   SPEC GRAV UA  >=1.050*   PH UA  6.0   GLUCOSE UA mg/dl Negative   KETONES UA mg/dl 10 (1+)*   BLOOD UA  Small*   PROTEIN UA mg/dl 30 (1+)*   NITRITE UA  Negative   BILIRUBIN UA  Negative   UROBILINOGEN UA (BE) mg/dl <2.0   LEUKOCYTES UA  Negative   WBC UA /hpf 2-4*   RBC UA /hpf 4-10*   BACTERIA UA /hpf None Seen   EPITHELIAL CELLS WET PREP /hpf Occasional   MUCUS THREADS  Occasional*               Results from last 7 days   Lab Units 01/07/25  1403 01/07/25  1357   BLOOD CULTURE  Received in Microbiology Lab. Culture in Progress. Received in Microbiology Lab. Culture in Progress.           Admitting Diagnosis: Thrombocytopenia (HCC) [D69.6]  Elevated troponin [R79.89]  Mouth sores [K13.79]  Age/Sex: 84 y.o. female    Network Utilization Review Department  ATTENTION: Please call with any questions or concerns to [phone] and carefully listen to the prompts so that you are directed to the right person. All voicemails are confidential.   For Discharge needs, contact Care Management DC Support Team at [phone] opt. [phone]  Send all requests for admission clinical reviews, approved or denied determinations and any other requests to dedicated fax number below belonging to the campus where the patient is receiving treatment. List of dedicated fax numbers for the Facilities:  FACILITY NAME UR FAX NUMBER   ADMISSION DENIALS (Administrative/Medical Necessity) [phone]   DISCHARGE SUPPORT TEAM (NETWORK) [phone]   [facility] CHILD [facility]  (Maternity/NICU/Pediatrics) [phone]   [facility] [phone]   [facility] [phone]   [facility] [phone]   [facility] [phone]   [facility] [phone]   [facility] [phone]   [facility] [phone]   [facility] [phone]   [facility] [phone]   [facility] [phone]   [facility] [phone]   [facility] [phone]

## 2025-01-08 NOTE — PROGRESS NOTES
"Progress Note - Hospitalist   Name: Bart Jasso 84 y.o. female I MRN: [id number]  Unit/Bed#: 2 E 272-01 I Date of Admission: 1/7/2025   Date of Service: 1/8/2025 I Hospital Day: 0    Assessment & Plan  Pancytopenia (HCC)  84-year-old female patient from [facility] here with multiple problems.  Elevated troponin likely secondary to A-fib RVR.  Noted pancytopenia, mouth ulcer, odynophagia, chest pain.  Difficult to obtain history since patient primarily speaks native language \"[place] \", patient's daughter speak patient language however daughter does not speak English however grandson is helping contribute to patient's daughter who is helping interpret with the patient.    CBC noted with leukopenia, anemia with hemoglobin 10.2, platelets 26,000    Overall history limited since family member does not seem to know patient's primary medications and medical history.  Care discussed with heme-onc who is recommending ID consultation as well as IR consult for bone marrow biopsy.  Workup for viral etiology and mouth sore swabs ordered.  Ongoing discussion with heme-onc for possible platelet transfusion.  Further care per ID and heme-onc recommendation.      Elevated troponin  84-year-old female patient from [facility] here with multiple problems.  Elevated troponin likely secondary to A-fib RVR.  Troponins are elevated however flat.  Follow-up with cardiology recommendation.  Cardiac arrhythmia  Reportedly had A-fib with RVR and outpatient PCP office earlier today  Currently normal sinus rhythm and rate controlled  Will monitor on telemetry for now  Mouth pain  Reports of mouth pain for the past week or 2 with poor oral intake  Noted with mouth sore.  Care discussed with GI and currently viral workup including HSV mouth swab ordered  Follow-up with ID consultation.  CKD (chronic kidney disease) stage 3, GFR 30-59 ml/min ([facility])  Lab Results   Component Value Date    EGFR 56 01/08/2025    EGFR 44 01/07/2025    EGFR 79 " 03/28/2023    CREATININE 0.93 01/08/2025    CREATININE 1.14 01/07/2025    CREATININE 0.71 03/28/2023   Currently creatinine stable.  Paroxysmal atrial fibrillation (HCC)  Reportedly had episode of A-fib with RVR with outpatient PCP visit however currently EKG noted with normal sinus rhythm.  Further care per cardiology recommendation.    VTE Pharmacologic Prophylaxis: VTE Score: 6 Moderate Risk (Score 3-4) - Pharmacological DVT Prophylaxis Contraindicated. Sequential Compression Devices Ordered.    Mobility:   Basic Mobility Inpatient Raw Score: 18  -HLM Goal: 6: Walk 10 steps or more  JH-HLM Achieved: 6: Walk 10 steps or more      Patient Centered Rounds: I performed bedside rounds with nursing staff today.   Discussions with Specialists or Other Care Team Provider: Care discussed with GI, ID, heme-onc.    Education and Discussions with Family / Patient: Updated  (spoke with grandson and daughter) at bedside.    Current Length of Stay: 0 day(s)  Current Patient Status: Observation   Certification Statement: The patient will continue to require additional inpatient hospital stay due to multiple active issues currently workup ongoing  Discharge Plan: Anticipate discharge in 48 hrs to discharge location to be determined pending rehab evaluations.    Code Status: Level 1 - Full Code    Subjective   Seen during a.m. rounds.  Patient appears comfortable however limited history since patient primarily speaks native regional language from [place] and currently daughter and grandson at bedside assisting with interpretation.  Patient is complaining of some GI upset, cough, mouth soreness and spitting up blood, difficulty with swallowing.  Currently no other events reported.    Objective :  Temp:  [98.1 °F (36.7 °C)-98.7 °F (37.1 °C)] 98.7 °F (37.1 °C)  HR:  [65-73] 73  BP: (101-130)/(45-63) 112/47  Resp:  [16-20] 16  SpO2:  [94 %-96 %] 94 %  O2 Device: None (Room air)    Body mass index is 22.93 kg/m².      Input and Output Summary (last 24 hours):     Intake/Output Summary (Last 24 hours) at 1/8/2025 1327  Last data filed at 1/8/2025 0700  Gross per 24 hour   Intake 240 ml   Output 300 ml   Net -60 ml       Physical Exam  Constitutional:       General: She is not in acute distress.     Appearance: She is not ill-appearing, toxic-appearing or diaphoretic.   Cardiovascular:      Rate and Rhythm: Normal rate.      Pulses: Normal pulses.   Pulmonary:      Effort: Pulmonary effort is normal. No respiratory distress.      Breath sounds: No wheezing.   Abdominal:      General: Bowel sounds are normal. There is no distension.      Palpations: Abdomen is soft.      Tenderness: There is no abdominal tenderness.   Neurological:      Mental Status: She is alert. Mental status is at baseline.      Comments: Exam is also limited given her language barrier since 2 family were at the bedside assisting with interpretation.  Both family ember reports that patient is aware of what is happening.   Psychiatric:         Mood and Affect: Mood normal.         Behavior: Behavior normal.           Lines/Drains:        Telemetry:  Telemetry Orders (From admission, onward)               24 Hour Telemetry Monitoring  Continuous x 24 Hours (Telem)        Expiring   Question:  Reason for 24 Hour Telemetry  Answer:  Patients with robert/hitesh/endocarditis; cardiac contusion                     Telemetry Reviewed: Normal Sinus Rhythm  Indication for Continued Telemetry Use: Arrthymias requiring medical therapy               Lab Results: I have reviewed the following results:   Results from last 7 days   Lab Units 01/08/25  0443   WBC Thousand/uL 2.26*   HEMOGLOBIN g/dL 10.2*   HEMATOCRIT % 30.7*   PLATELETS Thousands/uL 26*   SEGS PCT % 29*   LYMPHO PCT % 64*   MONO PCT % 0*   EOS PCT % 7*     Results from last 7 days   Lab Units 01/08/25  0443 01/07/25  1357   SODIUM mmol/L 140 140   POTASSIUM mmol/L 4.1 4.4   CHLORIDE mmol/L 109* 107   CO2 mmol/L  27 26   BUN mg/dL 27* 26*   CREATININE mg/dL 0.93 1.14   ANION GAP mmol/L 4 7   CALCIUM mg/dL 8.1* 8.7   ALBUMIN g/dL  --  3.7   TOTAL BILIRUBIN mg/dL  --  0.89   ALK PHOS U/L  --  68   ALT U/L  --  14   AST U/L  --  12*   GLUCOSE RANDOM mg/dL 105 114     Results from last 7 days   Lab Units 01/07/25  1357   INR  1.13             Results from last 7 days   Lab Units 01/07/25  1357   LACTIC ACID mmol/L 1.1   PROCALCITONIN ng/ml <0.05       Recent Cultures (last 7 days):   Results from last 7 days   Lab Units 01/07/25  1403 01/07/25  1357   BLOOD CULTURE  Received in Microbiology Lab. Culture in Progress. Received in Microbiology Lab. Culture in Progress.       Imaging Results Review: I reviewed radiology reports from this admission including: CTA pe study report .  Other Study Results Review: EKG was personally reviewed and my interpretation is: NSR..    Last 24 Hours Medication List:     Current Facility-Administered Medications:     acetaminophen (TYLENOL) tablet 650 mg, Q6H PRN    albuterol (PROVENTIL HFA,VENTOLIN HFA) inhaler 2 puff, Q6H PRN    diphenhydramine, lidocaine, Al/Mg hydroxide, simethicone (Magic Mouthwash) oral solution 10 mL, Q4H PRN    fluticasone (FLONASE) 50 mcg/act nasal spray 1 spray, Daily    Administrative Statements   Today, Patient Was Seen By: Garret Singh MD  I have spent a total time of 50 minutes in caring for this patient on the day of the visit/encounter including Diagnostic results, Patient and family education, Impressions, Counseling / Coordination of care, Documenting in the medical record, Reviewing / ordering tests, medicine, procedures  , Obtaining or reviewing history  , and Communicating with other healthcare professionals .    **Please Note: This note may have been constructed using a voice recognition system.**

## 2025-01-08 NOTE — ASSESSMENT & PLAN NOTE
Suspect marrow suppression from acute illness/viral etiology  lower suspicion for hematolgical malignancy   No evidence of hemolysis apparent on labs  Check B12, iron panel, LD, peripheral flow cytometry, PNH  Physical exam without lymphadenopathy or splenomegaly  Check EBV, CMV panal   HSV, CMV swab of lesion   Check CT abdomen/pelvis to evaluate for hepatosplenomegaly   Continue supportive care, magic mouth rinse   Transfuse for PLT <50,000 given active bleeding.   Will plan for bone marrow biopsy while inpatient to rule out hematological malignancy  ID consult recommended   Nutrition consult recommended

## 2025-01-08 NOTE — ASSESSMENT & PLAN NOTE
- She has new thrombocytopenia compared to 2023 likely causing the bleeding  - Hematology consulted, consider bone marrow biopsy given pancytopenia

## 2025-01-08 NOTE — ASSESSMENT & PLAN NOTE
EKG without acute ischemic changes.  Denies anginal symptoms.  Likely nonischemic myocardial injury in the setting of paroxysmal atrial fibrillation with RVR, and possible underlying infection.

## 2025-01-08 NOTE — CONSULTS
Consultation - Oncology-Medical   Name: Bart Jasso 84 y.o. female I MRN: [id number]  Unit/Bed#: 2 E 272-01 I Date of Admission: 1/7/2025   Date of Service: 1/8/2025 I Hospital Day: 0   Inpatient consult to Hematology  Consult performed by: Maite Hedrick PA-C  Consult ordered by: Balbir Moore MD      Physician Requesting Evaluation: Garret NIETO MD   Reason for Evaluation / Principal Problem: pancytopenia       In summary this is an 84-year-old female who presented to the emergency room with abrupt onset of oral mouth pain.  On admission she had CT chest that was largely unremarkable.  Initial labs revealed pancytopenia with significant thrombocytopenia and platelet count around 33,000.  On physical exam she has significant ulcerations in the mouth with associated bleeding. Today she is having throat pain which is limiting her ability to tolerate oral intake.  Assessment & Plan  Pancytopenia (HCC)  Suspect marrow suppression from acute illness/viral etiology  lower suspicion for hematolgical malignancy   No evidence of hemolysis apparent on labs  Check B12, iron panel, LD, peripheral flow cytometry, PNH  Physical exam without lymphadenopathy or splenomegaly  Check EBV, CMV panal   HSV, CMV swab of lesion   Check CT abdomen/pelvis to evaluate for hepatosplenomegaly   Continue supportive care, magic mouth rinse   Transfuse for PLT <50,000 given active bleeding.   Will plan for bone marrow biopsy while inpatient to rule out hematological malignancy  ID consult recommended   Nutrition consult recommended    Mouth pain  ulcerations involving lips and soft palate  R/o viral etiology  HHSV and CMV culture   Serum CMV studies    GI consulted   Recommend ID consult as above   CKD (chronic kidney disease) stage 3, GFR 30-59 ml/min ([facility])  Lab Results   Component Value Date    EGFR 56 01/08/2025    EGFR 44 01/07/2025    EGFR 79 03/28/2023    CREATININE 0.93 01/08/2025    CREATININE 1.14 01/07/2025     CREATININE 0.71 03/28/2023     Paroxysmal atrial fibrillation (HCC)  Suspect due to acute illness   Cardiology consulted       History of Present Illness   Bart Jasso is a 84 y.o. female with no significant past medical history who presented to the emergency room for evaluation of oral pain and bleeding.  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  She had abrupt onset of oral mouth pain beginning 12/28/2024.  Symptoms progressed to throat pain and bleeding a few days ago.  She denies any hematochezia, hematuria, melena or other bleeding.  No bruising or new rashes.  She denies fever however has been experiencing night sweats since the start of her symptoms.  She is unable to tolerate oral intake secondary to pain.  Has been losing weight for this reason.  No recent medication changes.  The patient's daughter will be bringing in her medication list later today. She denies known exposure to sick contact prior to her leaving [facility] or while visiting the [place].     Patient speaks quichua.  Her daughter also speaks this language and Iranian.  Grandson speaks Iranian.  Interpretation was obtained through family.  They declined virtual interpretative.    CT abdomen 01/07/2025     PULMONARY ARTERIAL TREE:  No filling defect in the visualized pulmonary arteries to indicate an acute embolus.     LUNGS: Bronchial inflammation and fluid in right lower lobe segmental and subsegmental branches. No airspace consolidation or pulmonary edema. No tracheal or endobronchial lesion.     PLEURA: No effusion.     HEART/GREAT VESSELS: Cardiomegaly. No thoracic aortic aneurysm.     MEDIASTINUM AND GARY: No lymphadenopathy.     CHEST WALL AND LOWER NECK: Thyromegaly.     VISUALIZED STRUCTURES IN THE UPPER ABDOMEN: Cholelithiasis.     OSSEOUS STRUCTURES: No acute fracture or destructive osseous lesion.     IMPRESSION:  1. Right lower lobe bronchial inflammation. No airspace  consolidation.  2. No evidence of acute pulmonary embolus.       Review of Systems  Historical Information   I have reviewed the patient's PMH, PSH, Social History, Family History, Meds, and Allergies    Oncology History:   Cancer Staging   No matching staging information was found for the patient.    Oncology History    No history exists.     Current Facility-Administered Medications   Medication Dose Route Frequency Provider Last Rate Last Admin    acetaminophen (TYLENOL) tablet 650 mg  650 mg Oral Q6H PRN Balbir Moore MD   650 mg at 01/08/25 1203    albuterol (PROVENTIL HFA,VENTOLIN HFA) inhaler 2 puff  2 puff Inhalation Q6H PRN Balbir Moore MD   2 puff at 01/07/25 1600    diphenhydramine, lidocaine, Al/Mg hydroxide, simethicone (Magic Mouthwash) oral solution 10 mL  10 mL Swish & Spit Q4H PRN Balbir Moore MD   10 mL at 01/08/25 0222    fluticasone (FLONASE) 50 mcg/act nasal spray 1 spray  1 spray Nasal Daily Balbir Moore MD   1 spray at 01/08/25 1028       Objective :  Temp:  [98.1 °F (36.7 °C)-98.7 °F (37.1 °C)] 98.7 °F (37.1 °C)  HR:  [65-74] 74  BP: (101-130)/(45-60) 112/47  Resp:  [16-20] 16  SpO2:  [94 %-96 %] 94 %  O2 Device: None (Room air)    Physical Exam  Vitals and nursing note reviewed.   Constitutional:       General: She is not in acute distress.     Appearance: She is well-developed.   HENT:      Head: Normocephalic and atraumatic.      Mouth/Throat:      Comments: Ulcerations present upper lip, lower lip, left lateral tongue, soft palate with bleeding and soft palate.  Eyes:      Conjunctiva/sclera: Conjunctivae normal.   Cardiovascular:      Rate and Rhythm: Normal rate and regular rhythm.      Heart sounds: No murmur heard.  Pulmonary:      Effort: Pulmonary effort is normal. No respiratory distress.      Breath sounds: Normal breath sounds.   Abdominal:      Palpations: Abdomen is soft.      Tenderness: There is abdominal tenderness (LUQ pain).   Musculoskeletal:         General: No  swelling.      Cervical back: Neck supple.   Lymphadenopathy:      Cervical: No cervical adenopathy.      Upper Body:      Right upper body: No supraclavicular or axillary adenopathy.      Left upper body: No supraclavicular or axillary adenopathy.   Skin:     General: Skin is warm and dry.      Capillary Refill: Capillary refill takes less than 2 seconds.   Neurological:      Mental Status: She is alert.   Psychiatric:         Mood and Affect: Mood normal.           Lab Results: I have reviewed the following results:  Lab Results   Component Value Date    K 4.1 01/08/2025     (H) 01/08/2025    CO2 27 01/08/2025    BUN 27 (H) 01/08/2025    CREATININE 0.93 01/08/2025    CALCIUM 8.1 (L) 01/08/2025    AST 12 (L) 01/07/2025    ALT 14 01/07/2025    ALKPHOS 68 01/07/2025    EGFR 56 01/08/2025     Lab Results   Component Value Date    WBC 2.26 (L) 01/08/2025    HGB 10.2 (L) 01/08/2025    HCT 30.7 (L) 01/08/2025    MCV 87 01/08/2025    PLT 26 (L) 01/08/2025     Lab Results   Component Value Date    NEUTROABS 0.66 (L) 01/08/2025

## 2025-01-08 NOTE — ASSESSMENT & PLAN NOTE
"84-year-old female patient from [facility] here with multiple problems.  Elevated troponin likely secondary to A-fib RVR.  Noted pancytopenia, mouth ulcer, odynophagia, chest pain.  Difficult to obtain history since patient primarily speaks native language \"Quichia \", patient's daughter speak patient language however daughter does not speak English however grandson is helping contribute to patient's daughter who is helping interpret with the patient.    CBC noted with leukopenia, anemia with hemoglobin 10.2, platelets 26,000    Overall history limited since family member does not seem to know patient's primary medications and medical history.  Care discussed with heme-onc who is recommending ID consultation as well as IR consult for bone marrow biopsy.  Workup for viral etiology and mouth sore swabs ordered.  Ongoing discussion with heme-onc for possible platelet transfusion.  Further care per ID and heme-onc recommendation.      "

## 2025-01-08 NOTE — ASSESSMENT & PLAN NOTE
She is maintaining predominantly sinus rhythm with brief episodes of PAF on telemetry.  Suspect secondary to recent poor p.o. intake and possible underlying viral infection.  Echocardiogram pending  Will hold off on initiation of rate control given adequate rate control and soft BPs.  Will hold off on initiation of anticoagulation given pancytopenia and plans for potential bone marrow biopsy tomorrow per heme-onc.  Plan to discuss consideration of anticoagulation with the patient and family after procedures.  [id number] 3  Continue telemetry

## 2025-01-08 NOTE — ASSESSMENT & PLAN NOTE
Management per heme-onc.  Per discussion with heme-onc, suspected secondary to viral infection, with plans for bone marrow biopsy potentially tomorrow.

## 2025-01-08 NOTE — ASSESSMENT & PLAN NOTE
Lab Results   Component Value Date    EGFR 56 01/08/2025    EGFR 44 01/07/2025    EGFR 79 03/28/2023    CREATININE 0.93 01/08/2025    CREATININE 1.14 01/07/2025    CREATININE 0.71 03/28/2023   Currently creatinine stable.

## 2025-01-09 ENCOUNTER — APPOINTMENT (INPATIENT)
Dept: NON INVASIVE DIAGNOSTICS | Facility: HOSPITAL | Age: 85
DRG: 660 | End: 2025-01-09
Payer: COMMERCIAL

## 2025-01-09 ENCOUNTER — APPOINTMENT (INPATIENT)
Dept: CT IMAGING | Facility: HOSPITAL | Age: 85
DRG: 660 | End: 2025-01-09
Payer: COMMERCIAL

## 2025-01-09 PROBLEM — I35.1 AORTIC REGURGITATION: Status: ACTIVE | Noted: 2025-01-09

## 2025-01-09 PROBLEM — R50.9 FEVER: Status: ACTIVE | Noted: 2025-01-09

## 2025-01-09 LAB
ABO GROUP BLD BPU: NORMAL
ANION GAP SERPL CALCULATED.3IONS-SCNC: 4 MMOL/L (ref 4–13)
BACTERIA THROAT CULT: NORMAL
BLD SMEAR INTERP: NORMAL
BPU ID: NORMAL
BUN SERPL-MCNC: 19 MG/DL (ref 5–25)
CALCIUM SERPL-MCNC: 7.7 MG/DL (ref 8.4–10.2)
CHLORIDE SERPL-SCNC: 108 MMOL/L (ref 96–108)
CO2 SERPL-SCNC: 27 MMOL/L (ref 21–32)
CREAT SERPL-MCNC: 0.83 MG/DL (ref 0.6–1.3)
ERYTHROCYTE [DISTWIDTH] IN BLOOD BY AUTOMATED COUNT: 13.6 % (ref 11.6–15.1)
ERYTHROCYTE [DISTWIDTH] IN BLOOD BY AUTOMATED COUNT: 13.6 % (ref 11.6–15.1)
GFR SERPL CREATININE-BSD FRML MDRD: 64 ML/MIN/1.73SQ M
GLUCOSE SERPL-MCNC: 101 MG/DL (ref 65–140)
HAPTOGLOB SERPL-MCNC: 239 MG/DL (ref 41–333)
HBV CORE AB SER QL: NORMAL
HBV CORE IGM SER QL: NORMAL
HBV SURFACE AG SER QL: NORMAL
HCT VFR BLD AUTO: 29.7 % (ref 34.8–46.1)
HCT VFR BLD AUTO: 29.8 % (ref 34.8–46.1)
HCV AB SER QL: NORMAL
HGB BLD-MCNC: 10 G/DL (ref 11.5–15.4)
HGB BLD-MCNC: 9.9 G/DL (ref 11.5–15.4)
HIV 1+2 AB+HIV1 P24 AG SERPL QL IA: NORMAL
HIV 2 AB SERPL QL IA: NORMAL
HIV1 AB SERPL QL IA: NORMAL
HIV1 P24 AG SERPL QL IA: NORMAL
HSV1 DNA SPEC QL NAA+PROBE: NORMAL
HSV1 DNA SPEC QL NAA+PROBE: NORMAL
MCH RBC QN AUTO: 29 PG (ref 26.8–34.3)
MCH RBC QN AUTO: 29.2 PG (ref 26.8–34.3)
MCHC RBC AUTO-ENTMCNC: 33.2 G/DL (ref 31.4–37.4)
MCHC RBC AUTO-ENTMCNC: 33.7 G/DL (ref 31.4–37.4)
MCV RBC AUTO: 87 FL (ref 82–98)
MCV RBC AUTO: 87 FL (ref 82–98)
NRBC BLD AUTO-RTO: 0 /100 WBCS
PLATELET # BLD AUTO: 84 THOUSANDS/UL (ref 149–390)
PLATELET # BLD AUTO: 94 THOUSANDS/UL (ref 149–390)
PMV BLD AUTO: 9.7 FL (ref 8.9–12.7)
PMV BLD AUTO: 9.8 FL (ref 8.9–12.7)
POTASSIUM SERPL-SCNC: 3.7 MMOL/L (ref 3.5–5.3)
RBC # BLD AUTO: 3.41 MILLION/UL (ref 3.81–5.12)
RBC # BLD AUTO: 3.43 MILLION/UL (ref 3.81–5.12)
SODIUM SERPL-SCNC: 139 MMOL/L (ref 135–147)
UNIT DISPENSE STATUS: NORMAL
UNIT PRODUCT CODE: NORMAL
UNIT PRODUCT VOLUME: 244 ML
UNIT RH: NORMAL
WBC # BLD AUTO: 1.89 THOUSAND/UL (ref 4.31–10.16)
WBC # BLD AUTO: 2.29 THOUSAND/UL (ref 4.31–10.16)

## 2025-01-09 PROCEDURE — 88360 TUMOR IMMUNOHISTOCHEM/MANUAL: CPT | Performed by: PATHOLOGY

## 2025-01-09 PROCEDURE — 88185 FLOWCYTOMETRY/TC ADD-ON: CPT

## 2025-01-09 PROCEDURE — 80048 BASIC METABOLIC PNL TOTAL CA: CPT | Performed by: STUDENT IN AN ORGANIZED HEALTH CARE EDUCATION/TRAINING PROGRAM

## 2025-01-09 PROCEDURE — 88184 FLOWCYTOMETRY/ TC 1 MARKER: CPT

## 2025-01-09 PROCEDURE — 88311 DECALCIFY TISSUE: CPT | Performed by: PATHOLOGY

## 2025-01-09 PROCEDURE — 99232 SBSQ HOSP IP/OBS MODERATE 35: CPT | Performed by: INTERNAL MEDICINE

## 2025-01-09 PROCEDURE — 77002 NEEDLE LOCALIZATION BY XRAY: CPT

## 2025-01-09 PROCEDURE — 88342 IMHCHEM/IMCYTCHM 1ST ANTB: CPT | Performed by: PATHOLOGY

## 2025-01-09 PROCEDURE — 88313 SPECIAL STAINS GROUP 2: CPT | Performed by: PATHOLOGY

## 2025-01-09 PROCEDURE — C1830 POWER BONE MARROW BX NEEDLE: HCPCS

## 2025-01-09 PROCEDURE — 079T3ZX DRAINAGE OF BONE MARROW, PERCUTANEOUS APPROACH, DIAGNOSTIC: ICD-10-PCS | Performed by: RADIOLOGY

## 2025-01-09 PROCEDURE — 74176 CT ABD & PELVIS W/O CONTRAST: CPT

## 2025-01-09 PROCEDURE — 87529 HSV DNA AMP PROBE: CPT | Performed by: INTERNAL MEDICINE

## 2025-01-09 PROCEDURE — 99254 IP/OBS CNSLTJ NEW/EST MOD 60: CPT | Performed by: INTERNAL MEDICINE

## 2025-01-09 PROCEDURE — 88365 INSITU HYBRIDIZATION (FISH): CPT | Performed by: PATHOLOGY

## 2025-01-09 PROCEDURE — 85027 COMPLETE CBC AUTOMATED: CPT | Performed by: STUDENT IN AN ORGANIZED HEALTH CARE EDUCATION/TRAINING PROGRAM

## 2025-01-09 PROCEDURE — 85007 BL SMEAR W/DIFF WBC COUNT: CPT | Performed by: PHYSICIAN ASSISTANT

## 2025-01-09 PROCEDURE — 83918 ORGANIC ACIDS TOTAL QUANT: CPT | Performed by: PHYSICIAN ASSISTANT

## 2025-01-09 PROCEDURE — 99232 SBSQ HOSP IP/OBS MODERATE 35: CPT | Performed by: STUDENT IN AN ORGANIZED HEALTH CARE EDUCATION/TRAINING PROGRAM

## 2025-01-09 PROCEDURE — NC001 PR NO CHARGE: Performed by: INTERNAL MEDICINE

## 2025-01-09 PROCEDURE — 85097 BONE MARROW INTERPRETATION: CPT | Performed by: PATHOLOGY

## 2025-01-09 PROCEDURE — 99232 SBSQ HOSP IP/OBS MODERATE 35: CPT | Performed by: PHYSICIAN ASSISTANT

## 2025-01-09 PROCEDURE — 88364 INSITU HYBRIDIZATION (FISH): CPT | Performed by: PATHOLOGY

## 2025-01-09 PROCEDURE — 88237 TISSUE CULTURE BONE MARROW: CPT

## 2025-01-09 PROCEDURE — 88264 CHROMOSOME ANALYSIS 20-25: CPT

## 2025-01-09 PROCEDURE — 38222 DX BONE MARROW BX & ASPIR: CPT

## 2025-01-09 PROCEDURE — 88341 IMHCHEM/IMCYTCHM EA ADD ANTB: CPT | Performed by: PATHOLOGY

## 2025-01-09 PROCEDURE — 88374 M/PHMTRC ALYS ISHQUANT/SEMIQ: CPT

## 2025-01-09 PROCEDURE — 88305 TISSUE EXAM BY PATHOLOGIST: CPT | Performed by: PATHOLOGY

## 2025-01-09 PROCEDURE — G0545 PR INHERENT VISIT TO INPT: HCPCS | Performed by: INTERNAL MEDICINE

## 2025-01-09 PROCEDURE — 07DR3ZX EXTRACTION OF ILIAC BONE MARROW, PERCUTANEOUS APPROACH, DIAGNOSTIC: ICD-10-PCS | Performed by: RADIOLOGY

## 2025-01-09 PROCEDURE — 86747 PARVOVIRUS ANTIBODY: CPT | Performed by: INTERNAL MEDICINE

## 2025-01-09 PROCEDURE — 87207 SMEAR SPECIAL STAIN: CPT | Performed by: INTERNAL MEDICINE

## 2025-01-09 RX ORDER — PANTOPRAZOLE SODIUM 40 MG/10ML
40 INJECTION, POWDER, LYOPHILIZED, FOR SOLUTION INTRAVENOUS ONCE
Status: COMPLETED | OUTPATIENT
Start: 2025-01-09 | End: 2025-01-09

## 2025-01-09 RX ORDER — FENTANYL CITRATE 50 UG/ML
INJECTION, SOLUTION INTRAMUSCULAR; INTRAVENOUS AS NEEDED
Status: COMPLETED | OUTPATIENT
Start: 2025-01-09 | End: 2025-01-09

## 2025-01-09 RX ORDER — FOLIC ACID 1 MG/1
1 TABLET ORAL DAILY
Status: DISCONTINUED | OUTPATIENT
Start: 2025-01-09 | End: 2025-01-16 | Stop reason: HOSPADM

## 2025-01-09 RX ORDER — ACETAMINOPHEN 10 MG/ML
1000 INJECTION, SOLUTION INTRAVENOUS ONCE
Status: COMPLETED | OUTPATIENT
Start: 2025-01-09 | End: 2025-01-09

## 2025-01-09 RX ORDER — MIDAZOLAM HYDROCHLORIDE 2 MG/2ML
INJECTION, SOLUTION INTRAMUSCULAR; INTRAVENOUS AS NEEDED
Status: COMPLETED | OUTPATIENT
Start: 2025-01-09 | End: 2025-01-09

## 2025-01-09 RX ORDER — ACETAMINOPHEN 325 MG/1
650 TABLET ORAL EVERY 6 HOURS PRN
Status: DISCONTINUED | OUTPATIENT
Start: 2025-01-09 | End: 2025-01-15

## 2025-01-09 RX ADMIN — PANTOPRAZOLE SODIUM 40 MG: 40 INJECTION, POWDER, FOR SOLUTION INTRAVENOUS at 00:50

## 2025-01-09 RX ADMIN — MIDAZOLAM HYDROCHLORIDE 1 MG: 1 INJECTION, SOLUTION INTRAMUSCULAR; INTRAVENOUS at 12:35

## 2025-01-09 RX ADMIN — FENTANYL CITRATE 50 MCG: 50 INJECTION, SOLUTION INTRAMUSCULAR; INTRAVENOUS at 12:28

## 2025-01-09 RX ADMIN — FENTANYL CITRATE 25 MCG: 50 INJECTION, SOLUTION INTRAMUSCULAR; INTRAVENOUS at 12:35

## 2025-01-09 RX ADMIN — FLUTICASONE PROPIONATE 1 SPRAY: 50 SPRAY, METERED NASAL at 17:18

## 2025-01-09 RX ADMIN — ACETAMINOPHEN 650 MG: 325 TABLET, FILM COATED ORAL at 17:21

## 2025-01-09 RX ADMIN — LIDOCAINE HYDROCHLORIDE 10 ML: 20 SOLUTION ORAL; TOPICAL at 04:51

## 2025-01-09 RX ADMIN — FOLIC ACID 1 MG: 1 TABLET ORAL at 17:21

## 2025-01-09 RX ADMIN — MIDAZOLAM HYDROCHLORIDE 1 MG: 1 INJECTION, SOLUTION INTRAMUSCULAR; INTRAVENOUS at 12:28

## 2025-01-09 RX ADMIN — ACETAMINOPHEN 1000 MG: 1000 INJECTION INTRAVENOUS at 00:50

## 2025-01-09 RX ADMIN — LIDOCAINE HYDROCHLORIDE 10 ML: 20 SOLUTION ORAL; TOPICAL at 17:18

## 2025-01-09 NOTE — ASSESSMENT & PLAN NOTE
Presented with painful mouth ulcerations in the setting of pancytopenia.  Consideration for viral process, as below. Consideration for autoimmune process. Consider drug reaction? No rashes on the skin. Patient was recently started on NSAID and prednisone in [facility] prior to traveling here.   Recheck HSV PCR from oral lesions   Follow-up CMV culture.   Consider dermatology evaluation.

## 2025-01-09 NOTE — BRIEF OP NOTE (RAD/CATH)
IR BIOPSY BONE MARROW Procedure Note    PATIENT NAME: Bart Jasso  : 1940  MRN: [id number]    Pre-op Diagnosis:   1. Elevated troponin    2. Thrombocytopenia (HCC)    3. Paroxysmal atrial fibrillation (HCC)    4. Odynophagia    5. Pancytopenia (HCC)    6. Mouth pain      Post-op Diagnosis:   1. Elevated troponin    2. Thrombocytopenia (HCC)    3. Paroxysmal atrial fibrillation (HCC)    4. Odynophagia    5. Pancytopenia (HCC)    6. Mouth pain        Surgeon:   MAKAYLA Barber  Assistants:     No qualified resident was available, Resident is only observing    Estimated Blood Loss: minimal  Findings: right iliac bone marrow biopsy    Specimens: 5 ml aspirate and one core    Complications:  none immediate    Anesthesia: conscious sedation and local    MAKAYLA Barber     Date: 2025  Time: 12:59 PM

## 2025-01-09 NOTE — ASSESSMENT & PLAN NOTE
Reportedly had A-fib with RVR and outpatient PCP office earlier today  Currently normal sinus rhythm and rate controlled  Episode likely due to underlying infection and since patient is currently maintained normal sinus rhythm currently plan is not to anticoagulate.

## 2025-01-09 NOTE — ASSESSMENT & PLAN NOTE
ulcerations involving lips and soft palate  R/o viral, infectious etiology. Possible drug reaction?   HSV and CMV culture   Serum CMV studies

## 2025-01-09 NOTE — ASSESSMENT & PLAN NOTE
She is maintaining sinus rhythm  Suspect secondary to recent poor p.o. intake and possible underlying viral infection.  Will hold off on initiation of rate control given she is predominantly maintaining sinus rhythm, and is experiencing soft BPs.  Thromboembolic risks without anticoagulation versus bleeding risks with anticoagulation in the setting of pancytopenia discussed with patient and her family.  Given pancytopenia, that she is now maintaining sinus rhythm and the episodes of atrial fibrillation noted on telemetry were very brief, and that her atrial fibrillation is likely precipitated by underlying acute conditions, recommend holding off on initiation of anticoagulation at this time.  Patient would likely benefit from elective event monitor to assess for recurrence of atrial fibrillation.  [id number] 3  Continue telemetry

## 2025-01-09 NOTE — INTERVAL H&P NOTE
H&P reviewed. There have been no interval changes since the time the H&P was written.     Vitals:    01/09/25 1150   BP: 144/64   Pulse: 77   Resp: 18   Temp: 99.4 °F (37.4 °C)   SpO2: 96%         Prior imaging was reviewed.  Allergies reviewed     Informed written consent was obtained and questions were answered.     Discussed bone marrow biopsy with patient and son. Risks, benefits, and alternatives reviewed.     Patient wishes to proceed with procedure. ASA 2, Mallampati 3.       MAKAYLA Espinosa

## 2025-01-09 NOTE — ASSESSMENT & PLAN NOTE
Reports of mouth pain for the past week or 2 with poor oral intake  Patient was given NSAIDs, steroids and [place] prior to traveling here.  Recheck HSV PCR from oral lesion, follow-up with CMP cultures.  ID following.

## 2025-01-09 NOTE — ASSESSMENT & PLAN NOTE
Patient developed fever up to 101 early in hospitalization.  In the setting of severe bleeding mouth oral ulcerations and pancytopenia, as below.  Of note, patient is from [facility]. Blood cultures are negative thus far.  UA was negative.  CT chest showed right lower lobe bronchial inflammation with no consolidation.  Negative procalcitonin.  No acute findings on CT A/P.  Normal LFTs.  Consideration for viral process, as below.  Fortunately, patient remains hemodynamically stable and nontoxic in appearance. No clinical evidence of encephalitis/meningitis.   Continue to observe closely off antibiotic for now as workup for acute bacterial infection is negative thus far, patient remains clinically stable and last ANC was above 500.   Continue to follow blood cultures   Follow temperatures and hemodynamics.   Check CBC/ANC in AM   Remainder of workup as below.

## 2025-01-09 NOTE — PROGRESS NOTES
Cardiology Progress Note - Bart Jasso 84 y.o. female MRN: [id number]    Unit/Bed#: 2 E 272-01 Encounter: [id number]      Assessment/Plan:   Assessment & Plan  Paroxysmal atrial fibrillation (HCC)  She is maintaining sinus rhythm  Suspect secondary to recent poor p.o. intake and possible underlying viral infection.  Will hold off on initiation of rate control given she is predominantly maintaining sinus rhythm, and is experiencing soft BPs.  Thromboembolic risks without anticoagulation versus bleeding risks with anticoagulation in the setting of pancytopenia discussed with patient and her family.  Given pancytopenia, that she is now maintaining sinus rhythm and the episodes of atrial fibrillation noted on telemetry were very brief, and that her atrial fibrillation is likely precipitated by underlying acute conditions, recommend holding off on initiation of anticoagulation at this time.  Patient would likely benefit from elective event monitor to assess for recurrence of atrial fibrillation.  [id number] 3  Continue telemetry    Elevated troponin  EKG without acute ischemic changes.  Denies anginal symptoms.  Likely nonischemic myocardial injury in the setting of paroxysmal atrial fibrillation with RVR, and possible underlying infection.    Pancytopenia (HCC)  Management per heme-onc/ID.  She is s/p platelet transfusion overnight.  Per discussion with heme-onc, suspected secondary to viral infection, with plans for bone marrow biopsy potentially today.    Mouth pain  Management per primary team/heme-onc/ID    Fever  Management per primary team/ID    Aortic regurgitation  Mild to moderate  Recommend routine surveillance        Subjective:   Patient seen and examined.  Patient's family member assisted with translating.  They offer no cardiac concerns or complaints today.    We discussed that patient has been maintaining sinus rhythm, and that episodes of atrial fibrillation in the hospitalization thus far  "were very brief, and suspected secondary to acute underlying process.  We discussed thromboembolic risk of atrial fibrillation without anticoagulation, as well as bleeding risk with anticoagulation, specifically in the setting of pancytopenia.  We advised that given current pancytopenia and elevated bleeding risk, recommend holding off on initiation of anticoagulation at this time, and that she may benefit from elective event monitor to assess for recurrence of atrial fibrillation to determine if she would benefit from anticoagulation.  Patient and family member expressed understanding and are agreeable to plan of care.      Echocardiogram yesterday demonstrated EF 52%, mild to moderate aortic regurgitation.    Objective:     Vitals: Blood pressure 123/56, pulse 80, temperature 99.4 °F (37.4 °C), temperature source Tympanic, resp. rate 18, height 5' 4\" (1.626 m), weight 60.3 kg (133 lb), SpO2 99%., Body mass index is 22.83 kg/m².,   Orthostatic Blood Pressures      Flowsheet Row Most Recent Value   Blood Pressure 123/56 filed at 01/09/2025 1252   Patient Position - Orthostatic VS Lying filed at 01/09/2025 0700              Intake/Output Summary (Last 24 hours) at 1/9/2025 1310  Last data filed at 1/9/2025 0649  Gross per 24 hour   Intake 364 ml   Output 400 ml   Net -36 ml         Physical Exam:   GEN: Awake, in no acute distress.  Frail appearing, ill-appearing  HEENT: Sclera anicteric, conjunctivae pink. Mouth erythema and sores noted.   NECK: Supple, no significant JVD. Trachea midline.   HEART: Regular rhythm, normal S1 and S2, no murmurs, clicks, gallops or rubs. PMI nondisplaced, no thrills.   LUNGS: Clear to auscultation bilaterally; no wheezes, rales, or rhonchi. No increased work of breathing or signs of respiratory distress.   ABDOMEN: Soft, nontender, non-distended.   EXTREMITIES: Skin warm and well perfused, no clubbing, cyanosis, or edema.  SKIN: Normal without suspicious lesions on exposed " skin.      Telemetry:  Telemetry Orders (From admission, onward)               24 Hour Telemetry Monitoring  Continuous x 24 Hours (Telem)        Expiring   Question:  Reason for 24 Hour Telemetry  Answer:  Patients with robert/hitesh/endocarditis; cardiac contusion                     Telemetry Reviewed:  She is maintaining sinus rhythm on telemetry with no further episodes of atrial fibrillation since yesterday.    Medications:      Current Facility-Administered Medications:     acetaminophen (TYLENOL) tablet 650 mg, 650 mg, Oral, Q6H PRN, Angie Lauren PA-C    albuterol (PROVENTIL HFA,VENTOLIN HFA) inhaler 2 puff, 2 puff, Inhalation, Q6H PRN, Balbir Moore MD, 2 puff at 01/07/25 1600    diphenhydramine, lidocaine, Al/Mg hydroxide, simethicone (Magic Mouthwash) oral solution 10 mL, 10 mL, Swish & Spit, Q4H PRN, Balbir Moore MD, 10 mL at 01/09/25 0451    fluticasone (FLONASE) 50 mcg/act nasal spray 1 spray, 1 spray, Nasal, Daily, Balbir Moore MD, 1 spray at 01/08/25 1028    folic acid (FOLVITE) tablet 1 mg, 1 mg, Oral, Daily, Maite Hedrick PA-C     Labs & Results:    Results from last 7 days   Lab Units 01/08/25  1743 01/08/25  1535 01/08/25  1338 01/07/25  2013 01/07/25  1612 01/07/25  1357   HS TNI 0HR ng/L  --   --  92*  --   --  120*   HS TNI 2HR ng/L  --  90*  --   --  136*  --    HS TNI 4HR ng/L 84*  --   --  174*  --   --    HSTNI D4 ng/L -8  --   --  54*  --   --      Results from last 7 days   Lab Units 01/09/25  0447 01/08/25  0443 01/07/25  1357   WBC Thousand/uL 2.29* 2.26* 3.19*   HEMOGLOBIN g/dL 9.9* 10.2* 11.4*   HEMATOCRIT % 29.8* 30.7* 35.1   PLATELETS Thousands/uL 94* 26* 33*         Results from last 7 days   Lab Units 01/09/25  0447 01/08/25  0443 01/07/25  1357   POTASSIUM mmol/L 3.7 4.1 4.4   CHLORIDE mmol/L 108 109* 107   CO2 mmol/L 27 27 26   BUN mg/dL 19 27* 26*   CREATININE mg/dL 0.83 0.93 1.14   CALCIUM mg/dL 7.7* 8.1* 8.7   ALK PHOS U/L  --   --  68   ALT U/L  --   --  14    AST U/L  --   --  12*     Results from last 7 days   Lab Units 01/07/25  1357   INR  1.13   PTT seconds 29           ** Please Note: Fluency DirectDictation voice to text software may have been used in the creation of this document. **

## 2025-01-09 NOTE — ASSESSMENT & PLAN NOTE
Cardiology input noted.  EKG without acute ischemic changes.  Suspected secondary to atrial fibrillation with RVR in addition to underlying acute process, currently being worked up.

## 2025-01-09 NOTE — ASSESSMENT & PLAN NOTE
Presented with significant pancytopenia with platelet count as low as 26, ANC of 660.  Consideration for acute viral process, [facility]. No transaminitis. Negative HIV, hepatitis panel.  Consideration for acute hematologic process including possible malignancy.  Status post bone marrow biopsy today.   Check Dengue serologies.  Follow-up CMV/EBV serologies   Check parvovirus serologies, parasite smear   Follow-up bone marrow biopsy results   Hematology input appreciated

## 2025-01-09 NOTE — PROGRESS NOTES
Progress Note - Oncology-Medical   Name: Bart Jasso 84 y.o. female I MRN: [id number]  Unit/Bed#: 2 E 272-01 I Date of Admission: 1/7/2025   Date of Service: 1/9/2025 I Hospital Day: 1    Assessment & Plan  Pancytopenia (HCC)  CT Chest Right lower lobe bronchial inflammation otherwise normal   CT abdomen: wo hepatosplenomegaly . 2.5cm cyst on pancreas.  Suspect marrow suppression from acute illness/viral etiology  lower suspicion for hematolgical malignancy   No evidence of hemolysis apparent on labs  Check B12, iron panel, LD, peripheral flow cytometry, PNH  Physical exam without lymphadenopathy or splenomegaly  Check EBV, CMV panal   HSV, CMV swab of lesion   Continue supportive care, magic mouth rinse   Transfuse for PLT <50,000 given active bleeding.   BMBx pending. Message sent to path to expedite prelim report  ID on board. Infectious work up pending   Nutrition consult recommended  Mouth pain  ulcerations involving lips and soft palate  R/o viral, infectious etiology. Possible drug reaction?   HSV and CMV culture   Serum CMV studies    Paroxysmal atrial fibrillation (HCC)  Suspect due to acute illness   Cardiology consulted   Fever  Likely infectious, work up as above     24 Hour Events : febrile yesterday. clinically she is feeling better. Slight improvement in mouth pain and throat pain although still with difficulty opening mouth and oral intake.     Objective :  Temp:  [97.8 °F (36.6 °C)-101 °F (38.3 °C)] 98.2 °F (36.8 °C)  HR:  [] 85  BP: (109-169)/() 120/55  Resp:  [16-22] 18  SpO2:  [90 %-99 %] 95 %  O2 Device: None (Room air)  Nasal Cannula O2 Flow Rate (L/min):  [2 L/min] 2 L/min    Physical Exam  Vitals and nursing note reviewed.   Constitutional:       General: She is not in acute distress.     Appearance: She is well-developed.   HENT:      Head: Normocephalic and atraumatic.      Mouth/Throat:      Comments: +mouth ulcerations on lips, tongue, soft palate. Lip sloughing  when swabbed  Eyes:      Conjunctiva/sclera: Conjunctivae normal.   Cardiovascular:      Rate and Rhythm: Normal rate.   Pulmonary:      Effort: Pulmonary effort is normal.      Breath sounds: Normal breath sounds.   Musculoskeletal:         General: No swelling.      Cervical back: Neck supple.   Skin:     General: Skin is warm and dry.      Capillary Refill: Capillary refill takes less than 2 seconds.   Neurological:      Mental Status: She is alert.   Psychiatric:         Mood and Affect: Mood normal.         Lab Results: I have reviewed the following results:

## 2025-01-09 NOTE — ASSESSMENT & PLAN NOTE
Lab Results   Component Value Date    EGFR 64 01/09/2025    EGFR 56 01/08/2025    EGFR 44 01/07/2025    CREATININE 0.83 01/09/2025    CREATININE 0.93 01/08/2025    CREATININE 1.14 01/07/2025

## 2025-01-09 NOTE — PROGRESS NOTES
"Progress Note - Hospitalist   Name: Bart Jasso 84 y.o. female I MRN: [id number]  Unit/Bed#: 2 E 272-01 I Date of Admission: 1/7/2025   Date of Service: 1/9/2025 I Hospital Day: 1    Assessment & Plan  Pancytopenia (HCC)  84-year-old female patient from [facility] here with multiple problems.  Elevated troponin likely secondary to A-fib RVR.  Noted pancytopenia, mouth ulcer, odynophagia, chest pain.  Difficult to obtain history since patient primarily speaks native language \"[place] \", patient's daughter speak patient language however daughter does not speak English however grandson is helping contribute to patient's daughter who is helping interpret with the patient.    CBC reviewed and noted with hemoglobin 9.9, platelet 94,000 after unit transfusion yesterday.  Family at the bedside reports mild bleeding has improved after getting platelet transfusion.  Folate deficiency noted.  Follow-up with pending B12, iron panel, peripheral flow cytometry, EBV, CMV, HSV, CMV swab of the lesion, HIV pending.  Plan for bone marrow biopsy today.  ID, heme-onc on board.    Elevated troponin  84-year-old female patient from [facility] here with multiple problems.  Elevated troponin likely secondary to A-fib RVR.  Troponins are elevated however flat.  Cardiology recommended noncardiac in nature most likely because of underlying infection.    Cardiac arrhythmia  Reportedly had A-fib with RVR and outpatient PCP office earlier today  Currently normal sinus rhythm and rate controlled  Episode likely due to underlying infection and since patient is currently maintained normal sinus rhythm currently plan is not to anticoagulate.  Mouth pain  Reports of mouth pain for the past week or 2 with poor oral intake  Patient was given NSAIDs, steroids and [place] prior to traveling here.  Recheck HSV PCR from oral lesion, follow-up with CMP cultures.  ID following.  CKD (chronic kidney disease) stage 3, GFR 30-59 ml/min ([facility])  Lab Results "   Component Value Date    EGFR 64 01/09/2025    EGFR 56 01/08/2025    EGFR 44 01/07/2025    CREATININE 0.83 01/09/2025    CREATININE 0.93 01/08/2025    CREATININE 1.14 01/07/2025   Currently creatinine stable.  Paroxysmal atrial fibrillation (HCC)  Reportedly had episode of A-fib with RVR with outpatient PCP visit however currently EKG noted with normal sinus rhythm.  Further care per cardiology recommendation.    VTE Pharmacologic Prophylaxis: VTE Score: 6 Moderate Risk (Score 3-4) - Pharmacological DVT Prophylaxis Contraindicated. Sequential Compression Devices Ordered.    Mobility:   Basic Mobility Inpatient Raw Score: 18  JH-HLM Goal: 6: Walk 10 steps or more  JH-HLM Achieved: 6: Walk 10 steps or more  JH-HLM Goal achieved. Continue to encourage appropriate mobility.    Patient Centered Rounds: I performed bedside rounds with nursing staff today.   Discussions with Specialists or Other Care Team Provider: Care discussed with heme-onc, ID, cardiology    Education and Discussions with Family / Patient: Updated  (spoke with multiple family members at bedside.) at bedside.    Current Length of Stay: 1 day(s)  Current Patient Status: Inpatient   Certification Statement: The patient will continue to require additional inpatient hospital stay due to workup ongoing  Discharge Plan: Anticipate discharge in 48-72 hrs to discharge location to be determined pending rehab evaluations.    Code Status: Level 1 - Full Code    Subjective   Seen during a.m. rounds.  Family at bedside reports mild bleeding improved after platelet transfusion yesterday.  Denies any other new complaints.      Objective :  Temp:  [97.8 °F (36.6 °C)-101 °F (38.3 °C)] 100.2 °F (37.9 °C)  HR:  [] 68  BP: (109-140)/(43-74) 127/59  Resp:  [16-20] 20  SpO2:  [93 %-96 %] 93 %  O2 Device: None (Room air)    Body mass index is 22.83 kg/m².     Input and Output Summary (last 24 hours):     Intake/Output Summary (Last 24 hours) at 1/9/2025  0856  Last data filed at 1/9/2025 0649  Gross per 24 hour   Intake 364 ml   Output 600 ml   Net -236 ml       Physical Exam  Constitutional:       General: She is not in acute distress.     Appearance: She is not ill-appearing, toxic-appearing or diaphoretic.   HENT:      Mouth/Throat:      Comments: Bleeding mouth sore noted.  Cardiovascular:      Rate and Rhythm: Normal rate.      Pulses: Normal pulses.   Pulmonary:      Effort: Pulmonary effort is normal. No respiratory distress.      Breath sounds: No wheezing.   Abdominal:      General: Bowel sounds are normal. There is no distension.      Palpations: Abdomen is soft.      Tenderness: There is no abdominal tenderness.   Neurological:      Mental Status: She is alert. Mental status is at baseline.      Comments: Exam is also limited given her language barrier since 2 family were at the bedside assisting with interpretation.  Both family ember reports that patient is aware of what is happening.   Psychiatric:         Mood and Affect: Mood normal.         Behavior: Behavior normal.           Lines/Drains:        Telemetry:  Telemetry Orders (From admission, onward)               24 Hour Telemetry Monitoring  Continuous x 24 Hours (Telem)        Expiring   Question:  Reason for 24 Hour Telemetry  Answer:  Patients with robert/hitesh/endocarditis; cardiac contusion                     Telemetry Reviewed: Normal Sinus Rhythm  Indication for Continued Telemetry Use: No indication for continued use. Will discontinue.                Lab Results: I have reviewed the following results:   Results from last 7 days   Lab Units 01/09/25 0447 01/08/25 0443   WBC Thousand/uL 2.29* 2.26*   HEMOGLOBIN g/dL 9.9* 10.2*   HEMATOCRIT % 29.8* 30.7*   PLATELETS Thousands/uL 94* 26*   SEGS PCT %  --  29*   LYMPHO PCT %  --  64*   MONO PCT %  --  0*   EOS PCT %  --  7*     Results from last 7 days   Lab Units 01/09/25  0447 01/08/25  0443 01/07/25  1357   SODIUM mmol/L 139   < > 140    POTASSIUM mmol/L 3.7   < > 4.4   CHLORIDE mmol/L 108   < > 107   CO2 mmol/L 27   < > 26   BUN mg/dL 19   < > 26*   CREATININE mg/dL 0.83   < > 1.14   ANION GAP mmol/L 4   < > 7   CALCIUM mg/dL 7.7*   < > 8.7   ALBUMIN g/dL  --   --  3.7   TOTAL BILIRUBIN mg/dL  --   --  0.89   ALK PHOS U/L  --   --  68   ALT U/L  --   --  14   AST U/L  --   --  12*   GLUCOSE RANDOM mg/dL 101   < > 114    < > = values in this interval not displayed.     Results from last 7 days   Lab Units 01/07/25  1357   INR  1.13             Results from last 7 days   Lab Units 01/07/25  1357   LACTIC ACID mmol/L 1.1   PROCALCITONIN ng/ml <0.05       Recent Cultures (last 7 days):   Results from last 7 days   Lab Units 01/07/25  1403 01/07/25  1357   BLOOD CULTURE  No Growth at 24 hrs. No Growth at 24 hrs.             Last 24 Hours Medication List:     Current Facility-Administered Medications:     acetaminophen (TYLENOL) tablet 650 mg, Q6H PRN    albuterol (PROVENTIL HFA,VENTOLIN HFA) inhaler 2 puff, Q6H PRN    diphenhydramine, lidocaine, Al/Mg hydroxide, simethicone (Magic Mouthwash) oral solution 10 mL, Q4H PRN    fluticasone (FLONASE) 50 mcg/act nasal spray 1 spray, Daily    folic acid (FOLVITE) tablet 1 mg, Daily    Administrative Statements   Today, Patient Was Seen By: Garret Singh MD  I have spent a total time of 35 minutes in caring for this patient on the day of the visit/encounter including Diagnostic results, Patient and family education, Importance of tx compliance, Risk factor reductions, Counseling / Coordination of care, Documenting in the medical record, Reviewing / ordering tests, medicine, procedures  , and Obtaining or reviewing history  .    **Please Note: This note may have been constructed using a voice recognition system.**

## 2025-01-09 NOTE — ASSESSMENT & PLAN NOTE
Lab Results   Component Value Date    EGFR 64 01/09/2025    EGFR 56 01/08/2025    EGFR 44 01/07/2025    CREATININE 0.83 01/09/2025    CREATININE 0.93 01/08/2025    CREATININE 1.14 01/07/2025   Currently creatinine stable.

## 2025-01-09 NOTE — ASSESSMENT & PLAN NOTE
"84-year-old [facility] with [facility] problems.  Elevated troponin likely secondary to A-fib RVR.  Noted pancytopenia, mouth ulcer, odynophagia, chest pain.  Difficult to obtain history since patient primarily speaks native language \"[place] \", patient's daughter speak patient language however daughter does not speak English however grandson is helping contribute to patient's daughter who is helping interpret with the patient.    CBC reviewed and noted with hemoglobin 9.9, platelet 94,000 after unit transfusion yesterday.  Family at the bedside reports mild bleeding has improved after getting platelet transfusion.  Folate deficiency noted.  Follow-up with pending B12, iron panel, peripheral flow cytometry, EBV, CMV, HSV, CMV swab of the lesion, HIV pending.  Plan for bone marrow biopsy today.  ID, heme-onc on board.    "

## 2025-01-09 NOTE — ASSESSMENT & PLAN NOTE
84-year-old female patient from [facility] here with multiple problems.  Elevated troponin likely secondary to A-fib RVR.  Troponins are elevated however flat.  Cardiology recommended noncardiac in nature most likely because of underlying infection.

## 2025-01-09 NOTE — ASSESSMENT & PLAN NOTE
CT Chest Right lower lobe bronchial inflammation otherwise normal   CT abdomen: wo hepatosplenomegaly . 2.5cm cyst on pancreas.  Suspect marrow suppression from acute illness/viral etiology  lower suspicion for hematolgical malignancy   No evidence of hemolysis apparent on labs  Check B12, iron panel, LD, peripheral flow cytometry, PNH  Physical exam without lymphadenopathy or splenomegaly  Check EBV, CMV panal   HSV, CMV swab of lesion   Continue supportive care, magic mouth rinse   Transfuse for PLT <50,000 given active bleeding.   BMBx pending. Message sent to path to expedite prelim report  ID on board. Infectious work up pending   Nutrition consult recommended

## 2025-01-09 NOTE — CONSULTS
Consultation - Infectious Disease   Name: Bart Jasso 84 y.o. female I MRN: [id number]  Unit/Bed#: 2 E 272-01 I Date of Admission: 1/7/2025   Date of Service: 1/9/2025 I Hospital Day: 1   Inpatient consult to Infectious Diseases  Consult performed by: Alicia Paul DO  Consult ordered by: Garret NIETO MD        Physician Requesting Evaluation: Garret NIETO MD   Reason for Evaluation / Principal Problem: Fever, mouth sores    Assessment & Plan  Fever  Patient developed fever up to 101 early in hospitalization.  In the setting of severe bleeding mouth oral ulcerations and pancytopenia, as below.  Of note, patient is from [facility]. Blood cultures are negative thus far.  UA was negative.  CT chest showed right lower lobe bronchial inflammation with no consolidation.  Negative procalcitonin.  No acute findings on CT A/P.  Normal LFTs.  Consideration for viral process, as below.  Fortunately, patient remains hemodynamically stable and nontoxic in appearance. No clinical evidence of encephalitis/meningitis.   Continue to observe closely off antibiotic for now as workup for acute bacterial infection is negative thus far, patient remains clinically stable and last ANC was above 500.   Continue to follow blood cultures   Follow temperatures and hemodynamics.   Check CBC/ANC in AM   Remainder of workup as below.  Mouth pain  Presented with painful mouth ulcerations in the setting of pancytopenia.  Consideration for viral process, as below. Consideration for autoimmune process. Consider drug reaction? No rashes on the skin. Patient was recently started on NSAID and prednisone in [facility] prior to traveling here.   Recheck HSV PCR from oral lesions   Follow-up CMV culture.   Consider dermatology evaluation.   Pancytopenia (HCC)  Presented with significant pancytopenia with platelet count as low as 26, ANC of 660.  Consideration for acute viral process, potentially acquired in [place]. No transaminitis. Negative  HIV, hepatitis panel.  Consideration for acute hematologic process including possible malignancy.  Status post bone marrow biopsy today.   Check Dengue serologies.  Follow-up CMV/EBV serologies   Check parvovirus serologies, parasite smear   Follow-up bone marrow biopsy results   Hematology input appreciated  CKD (chronic kidney disease) stage 3, GFR 30-59 ml/min ([facility])  Lab Results   Component Value Date    EGFR 64 01/09/2025    EGFR 56 01/08/2025    EGFR 44 01/07/2025    CREATININE 0.83 01/09/2025    CREATININE 0.93 01/08/2025    CREATININE 1.14 01/07/2025     Elevated troponin  Cardiology input noted.  EKG without acute ischemic changes.  Suspected secondary to atrial fibrillation with RVR in addition to underlying acute process, currently being worked up.      Antibiotics:  None    I discussed above plan with primary service and hematology service.  I personally reviewed prior medical records.  Thank you for the consultation.  Will continue to follow the patient.        History of Present Illness   Bart Jasso is a 84 y.o. year old female who presented to ER on 1/7 with multiple complaints including recent development of painful mouth sores, making it difficult to eat resulting in poor oral intake.  Mouth sores also began to bleed for which patient presented to ER.  Also noted to have atrial fibrillation with RVR in addition to elevated troponin.  Labs revealed pancytopenia with WBC count of 3, platelet count of 33.  No fevers initially but patient subsequently developed a fever of 101 last evening.  Patient is from [facility] and was visiting her family since 12/18.  No known sick contacts.  She was evaluated by hematology with plan for IR bone marrow biopsy today.  HSV PCR swab was checked and is pending.  Of note, patient does have history of chronic cough and intermittent sore throat of unclear etiology.   Patient was having joint pains prior to travel here from [facility] and was prescribed ketorolac and  prednisone in [place]. No known sick contacts. No abd pain, diarrhea, open wounds on body, focal joint swelling, rashes on skin. She continues to have bleeding from the mouth with painful lip and tongue ulcers.     A complete review of systems is negative other than that noted in the HPI.    I have reviewed the patient's PMH, PSH, Social History, Family History, Meds, and Allergies  Historical Information   History reviewed. No pertinent past medical history.  History reviewed. No pertinent surgical history.  Social History     Tobacco Use    Smoking status: Never     Passive exposure: Never    Smokeless tobacco: Never   Substance and Sexual Activity    Alcohol use: Not Currently    Drug use: Never    Sexual activity: Not Currently     E-Cigarette/Vaping     E-Cigarette/Vaping Substances       Social History     Tobacco Use    Smoking status: Never     Passive exposure: Never    Smokeless tobacco: Never   Substance and Sexual Activity    Alcohol use: Not Currently    Drug use: Never    Sexual activity: Not Currently       Current Facility-Administered Medications:     acetaminophen (TYLENOL) tablet 650 mg, Q6H PRN    albuterol (PROVENTIL HFA,VENTOLIN HFA) inhaler 2 puff, Q6H PRN    diphenhydramine, lidocaine, Al/Mg hydroxide, simethicone (Magic Mouthwash) oral solution 10 mL, Q4H PRN    fluticasone (FLONASE) 50 mcg/act nasal spray 1 spray, Daily    folic acid (FOLVITE) tablet 1 mg, Daily  Prior to Admission Medications   Prescriptions Last Dose Informant Patient Reported? Taking?   Lidocaine Viscous HCl (XYLOCAINE) 2 % mucosal solution   No No   Sig: Swish and spit 15 mL 4 (four) times a day as needed for mouth pain or discomfort   albuterol (Ventolin HFA) 90 mcg/act inhaler   No No   Sig: Inhale 2 puffs every 6 (six) hours as needed for wheezing   amoxicillin (AMOXIL) 400 MG/5ML suspension   No No   Sig: Take 10.9 mL (875 mg total) by mouth 2 (two) times a day for 10 days   fluticasone (FLONASE) 50 mcg/act nasal  spray   No No   Si spray into each nostril daily   meloxicam (Mobic) 15 mg tablet   No No   Sig: Take 1 tablet (15 mg total) by mouth daily   promethazine-dextromethorphan (PHENERGAN-DM) 6.25-15 mg/5 mL oral syrup   No No   Sig: Take 5 mL by mouth 4 (four) times a day as needed for cough      Facility-Administered Medications: None     Patient has no known allergies.    Objective :  Temp:  [97.8 °F (36.6 °C)-101 °F (38.3 °C)] 100.2 °F (37.9 °C)  HR:  [] 68  BP: (109-140)/(43-74) 127/59  Resp:  [16-20] 20  SpO2:  [93 %-96 %] 93 %  O2 Device: None (Room air)    General:  Awake, alert, nontoxic, NAD  Eyes:  Conjunctive clear with no hemorrhages or effusions  Oropharynx:  Bleeding cankerous lesions on lips and tongue.   Neck:  Supple, no lymphadenopathy  Lungs:  Clear to auscultation bilaterally, no accessory muscle use  Cardiac:  Regular rate and rhythm, no murmurs  Abdomen:  Soft, non-tender, non-distented  Extremities:  No peripheral cyanosis, clubbing, or edema  Skin:  No visible skin rashes or wounds  Neurological:  Moves all four extremities spontaneously, no meningismus      Lab Results: I have reviewed the following results:  Results from last 7 days   Lab Units 25  1357   WBC Thousand/uL 2.29* 2.26* 3.19*   HEMOGLOBIN g/dL 9.9* 10.2* 11.4*   PLATELETS Thousands/uL 94* 26* 33*     Results from last 7 days   Lab Units 25  0443 25  1357   SODIUM mmol/L 139 140 140   POTASSIUM mmol/L 3.7 4.1 4.4   CHLORIDE mmol/L 108 109* 107   CO2 mmol/L 27 27 26   BUN mg/dL 19 27* 26*   CREATININE mg/dL 0.83 0.93 1.14   EGFR ml/min/1.73sq m 64 56 44   CALCIUM mg/dL 7.7* 8.1* 8.7   AST U/L  --   --  12*   ALT U/L  --   --  14   ALK PHOS U/L  --   --  68   ALBUMIN g/dL  --   --  3.7     Results from last 7 days   Lab Units 25  1403 25  1357   BLOOD CULTURE  No Growth at 24 hrs. No Growth at 24 hrs.     Results from last 7 days   Lab Units  01/07/25  1357   PROCALCITONIN ng/ml <0.05         Results from last 7 days   Lab Units 01/08/25  1338   FERRITIN ng/mL 278           Imaging Results Review: I personally reviewed the following image studies in PACS and associated radiology reports: CT chest. My interpretation of the radiology images/reports is: Right lower lobe bronchial inflammation.  No airspace consolidation..

## 2025-01-09 NOTE — PLAN OF CARE
Problem: PAIN - ADULT  Goal: Verbalizes/displays adequate comfort level or baseline comfort level  Description: Interventions:  - Encourage patient to monitor pain and request assistance  - Assess pain using appropriate pain scale  - Administer analgesics based on type and severity of pain and evaluate response  - Implement non-pharmacological measures as appropriate and evaluate response  - Consider cultural and social influences on pain and pain management  - Notify physician/advanced practitioner if interventions unsuccessful or patient reports new pain  Outcome: Progressing     Problem: INFECTION - ADULT  Goal: Absence or prevention of progression during hospitalization  Description: INTERVENTIONS:  - Assess and monitor for signs and symptoms of infection  - Monitor lab/diagnostic results  - Monitor all insertion sites, i.e. indwelling lines, tubes, and drains  - Monitor endotracheal if appropriate and nasal secretions for changes in amount and color  - [place] appropriate cooling/warming therapies per order  - Administer medications as ordered  - Instruct and encourage patient and family to use good hand hygiene technique  - Identify and instruct in appropriate isolation precautions for identified infection/condition  Outcome: Progressing     Problem: SAFETY ADULT  Goal: Patient will remain free of falls  Description: INTERVENTIONS:  - Educate patient/family on patient safety including physical limitations  - Instruct patient to call for assistance with activity   - Consult OT/PT to assist with strengthening/mobility   - Keep Call bell within reach  - Keep bed low and locked with side rails adjusted as appropriate  - Keep care items and personal belongings within reach  - Initiate and maintain comfort rounds  - Make Fall Risk Sign visible to staff  - Offer Toileting every  Hours, in advance of need  - Initiate/Maintain alarm  - Obtain necessary fall risk management equipment:   - Apply yellow socks and  bracelet for high fall risk patients  - Consider moving patient to room near nurses station  Outcome: Progressing  Goal: Maintain or return to baseline ADL function  Description: INTERVENTIONS:  -  Assess patient's ability to carry out ADLs; assess patient's baseline for ADL function and identify physical deficits which impact ability to perform ADLs (bathing, care of mouth/teeth, toileting, grooming, dressing, etc.)  - Assess/evaluate cause of self-care deficits   - Assess range of motion  - Assess patient's mobility; develop plan if impaired  - Assess patient's need for assistive devices and provide as appropriate  - Encourage maximum independence but intervene and supervise when necessary  - Involve family in performance of ADLs  - Assess for home care needs following discharge   - Consider OT consult to assist with ADL evaluation and planning for discharge  - Provide patient education as appropriate  Outcome: Progressing  Goal: Maintains/Returns to pre admission functional level  Description: INTERVENTIONS:  - Perform AM-PAC 6 Click Basic Mobility/ Daily Activity assessment daily.  - Set and communicate daily mobility goal to care team and patient/family/caregiver.   - Collaborate with rehabilitation services on mobility goals if consulted  - Perform Range of Motion  times a day.  - Reposition patient every  hours.  - Dangle patient  times a day  - Stand patient  times a day  - Ambulate patient  times a day  - Out of bed to chair times a day   - Out of bed for meals  times a day  - Out of bed for toileting  - Record patient progress and toleration of activity level   Outcome: Progressing     Problem: DISCHARGE PLANNING  Goal: Discharge to home or other facility with appropriate resources  Description: INTERVENTIONS:  - Identify barriers to discharge w/patient and caregiver  - Arrange for needed discharge resources and transportation as appropriate  - Identify discharge learning needs (meds, wound care, etc.)  -  Arrange for interpretive services to assist at discharge as needed  - Refer to Case Management Department for coordinating discharge planning if the patient needs post-hospital services based on physician/advanced practitioner order or complex needs related to functional status, cognitive ability, or social support system  Outcome: Progressing     Problem: Knowledge Deficit  Goal: Patient/family/caregiver demonstrates understanding of disease process, treatment plan, medications, and discharge instructions  Description: Complete learning assessment and assess knowledge base.  Interventions:  - Provide teaching at level of understanding  - Provide teaching via preferred learning methods  Outcome: Progressing     Problem: Prexisting or High Potential for Compromised Skin Integrity  Goal: Skin integrity is maintained or improved  Description: INTERVENTIONS:  - Identify patients at risk for skin breakdown  - Assess and monitor skin integrity  - Assess and monitor nutrition and hydration status  - Monitor labs   - Assess for incontinence   - Turn and reposition patient  - Assist with mobility/ambulation  - Relieve pressure over bony prominences  - Avoid friction and shearing  - Provide appropriate hygiene as needed including keeping skin clean and dry  - Evaluate need for skin moisturizer/barrier cream  - Collaborate with interdisciplinary team   - Patient/family teaching  - Consider wound care consult   Outcome: Progressing

## 2025-01-09 NOTE — ASSESSMENT & PLAN NOTE
Management per heme-onc/ID.  She is s/p platelet transfusion overnight.  Per discussion with heme-onc, suspected secondary to viral infection, with plans for bone marrow biopsy potentially today.

## 2025-01-10 ENCOUNTER — TELEPHONE (OUTPATIENT)
Dept: HEMATOLOGY ONCOLOGY | Facility: CLINIC | Age: 85
End: 2025-01-10

## 2025-01-10 LAB
B19V IGG SER IA-ACNC: 0.2 INDEX (ref 0–0.8)
B19V IGM SER IA-ACNC: 0.2 INDEX (ref 0–0.8)
BASOPHILS NFR MAR MANUAL: 1 % (ref 0–1)
BLD SMEAR FINDING POSITIVE INTERP: NO
EBV EA IGG SER QL IA: NEGATIVE
EBV NA IGG SER QL IA: POSITIVE
EBV VCA IGG SER QL IA: POSITIVE
EBV VCA IGM SER QL IA: NEGATIVE
EOSINOPHIL NFR BLD MANUAL: 17 % (ref 0–6)
ERYTHROCYTE [DISTWIDTH] IN BLOOD BY AUTOMATED COUNT: 13.6 % (ref 11.6–15.1)
FLUAV RNA RESP QL NAA+PROBE: NEGATIVE
FLUBV RNA RESP QL NAA+PROBE: NEGATIVE
HCT VFR BLD AUTO: 29.5 % (ref 34.8–46.1)
HGB BLD-MCNC: 9.9 G/DL (ref 11.5–15.4)
LYMPHOCYTES # BLD AUTO: 62 %
MCH RBC QN AUTO: 29.3 PG (ref 26.8–34.3)
MCHC RBC AUTO-ENTMCNC: 33.6 G/DL (ref 31.4–37.4)
MCV RBC AUTO: 87 FL (ref 82–98)
MONOCYTES NFR BLD AUTO: 5 % (ref 4–12)
NEUTS BAND NFR BLD MANUAL: 2 % (ref 0–8)
NEUTS SEG NFR BLD AUTO: 13 %
PARASITE BLD: NO
PARASITE BLD: NO
PARASITE INFECTED RBC BLD-NFR: 0 %
PLATELET # BLD AUTO: 81 THOUSANDS/UL (ref 149–390)
PLATELET BLD QL SMEAR: ABNORMAL
PMV BLD AUTO: 10.3 FL (ref 8.9–12.7)
RBC # BLD AUTO: 3.38 MILLION/UL (ref 3.81–5.12)
RBC MORPH BLD: NORMAL
RSV RNA RESP QL NAA+PROBE: NEGATIVE
SARS-COV-2 RNA RESP QL NAA+PROBE: NEGATIVE
TOTAL CELLS COUNTED SPEC: 100
WBC # BLD AUTO: 1.74 THOUSAND/UL (ref 4.31–10.16)

## 2025-01-10 PROCEDURE — G0545 PR INHERENT VISIT TO INPT: HCPCS | Performed by: INTERNAL MEDICINE

## 2025-01-10 PROCEDURE — 99232 SBSQ HOSP IP/OBS MODERATE 35: CPT | Performed by: INTERNAL MEDICINE

## 2025-01-10 PROCEDURE — 99232 SBSQ HOSP IP/OBS MODERATE 35: CPT | Performed by: STUDENT IN AN ORGANIZED HEALTH CARE EDUCATION/TRAINING PROGRAM

## 2025-01-10 PROCEDURE — 85007 BL SMEAR W/DIFF WBC COUNT: CPT | Performed by: RADIOLOGY

## 2025-01-10 PROCEDURE — 85027 COMPLETE CBC AUTOMATED: CPT | Performed by: STUDENT IN AN ORGANIZED HEALTH CARE EDUCATION/TRAINING PROGRAM

## 2025-01-10 PROCEDURE — 87449 NOS EACH ORGANISM AG IA: CPT | Performed by: INTERNAL MEDICINE

## 2025-01-10 PROCEDURE — 0241U HB NFCT DS VIR RESP RNA 4 TRGT: CPT | Performed by: STUDENT IN AN ORGANIZED HEALTH CARE EDUCATION/TRAINING PROGRAM

## 2025-01-10 RX ADMIN — LIDOCAINE HYDROCHLORIDE 10 ML: 20 SOLUTION ORAL; TOPICAL at 11:17

## 2025-01-10 RX ADMIN — FLUTICASONE PROPIONATE 1 SPRAY: 50 SPRAY, METERED NASAL at 11:17

## 2025-01-10 RX ADMIN — ACETAMINOPHEN 650 MG: 325 TABLET, FILM COATED ORAL at 19:42

## 2025-01-10 RX ADMIN — ACETAMINOPHEN 650 MG: 325 TABLET, FILM COATED ORAL at 03:50

## 2025-01-10 RX ADMIN — LIDOCAINE HYDROCHLORIDE 10 ML: 20 SOLUTION ORAL; TOPICAL at 21:42

## 2025-01-10 RX ADMIN — FOLIC ACID 1 MG: 1 TABLET ORAL at 11:44

## 2025-01-10 NOTE — ASSESSMENT & PLAN NOTE
Echocardiogram with EF of 52%, mild to moderate AR,  Recommend outpatient follow-up with cardiology.

## 2025-01-10 NOTE — PLAN OF CARE
Problem: SAFETY ADULT  Goal: Maintain or return to baseline ADL function  Description: INTERVENTIONS:  -  Assess patient's ability to carry out ADLs; assess patient's baseline for ADL function and identify physical deficits which impact ability to perform ADLs (bathing, care of mouth/teeth, toileting, grooming, dressing, etc.)  - Assess/evaluate cause of self-care deficits   - Assess range of motion  - Assess patient's mobility; develop plan if impaired  - Assess patient's need for assistive devices and provide as appropriate  - Encourage maximum independence but intervene and supervise when necessary  - Involve family in performance of ADLs  - Assess for home care needs following discharge   - Consider OT consult to assist with ADL evaluation and planning for discharge  - Provide patient education as appropriate  1/10/2025 0134 by Rowena Ramirez RN  Outcome: Progressing  1/10/2025 0130 by Rowena Ramirez RN  Outcome: Progressing     Problem: DISCHARGE PLANNING  Goal: Discharge to home or other facility with appropriate resources  Description: INTERVENTIONS:  - Identify barriers to discharge w/patient and caregiver  - Arrange for needed discharge resources and transportation as appropriate  - Identify discharge learning needs (meds, wound care, etc.)  - Arrange for interpretive services to assist at discharge as needed  - Refer to Case Management Department for coordinating discharge planning if the patient needs post-hospital services based on physician/advanced practitioner order or complex needs related to functional status, cognitive ability, or social support system  1/10/2025 0134 by Rowena Ramirez RN  Outcome: Progressing  1/10/2025 0130 by Rowena Ramirez RN  Outcome: Progressing     Problem: Knowledge Deficit  Goal: Patient/family/caregiver demonstrates understanding of disease process, treatment plan, medications, and discharge instructions  Description: Complete learning assessment and assess  knowledge base.  Interventions:  - Provide teaching at level of understanding  - Provide teaching via preferred learning methods  1/10/2025 0134 by Rowena Ramirez RN  Outcome: Progressing  1/10/2025 0130 by Rowena Ramirez RN  Outcome: Progressing     Problem: Prexisting or High Potential for Compromised Skin Integrity  Goal: Skin integrity is maintained or improved  Description: INTERVENTIONS:  - Identify patients at risk for skin breakdown  - Assess and monitor skin integrity  - Assess and monitor nutrition and hydration status  - Monitor labs   - Assess for incontinence   - Turn and reposition patient  - Assist with mobility/ambulation  - Relieve pressure over bony prominences  - Avoid friction and shearing  - Provide appropriate hygiene as needed including keeping skin clean and dry  - Evaluate need for skin moisturizer/barrier cream  - Collaborate with interdisciplinary team   - Patient/family teaching  - Consider wound care consult   1/10/2025 0134 by Rowena Ramirez RN  Outcome: Progressing  1/10/2025 0130 by Rowena Ramirez RN  Outcome: Progressing

## 2025-01-10 NOTE — ASSESSMENT & PLAN NOTE
Presented with significant pancytopenia with platelet count as low as 26, ANC of 660.  Consideration for acute viral process, potentially acquired in [facility]. No transaminitis. Negative HIV, hepatitis panel.  Consideration for acute hematologic process including possible malignancy.  Status post bone marrow biopsy 1/9 with results pending.  Parasite smear is negative.   Follow-up dengue serologies.  Follow-up CMV/EBV serologies   Follow-up parvovirus serologies.   Follow-up bone marrow biopsy results   Hematology input appreciated

## 2025-01-10 NOTE — PLAN OF CARE
Problem: PAIN - ADULT  Goal: Verbalizes/displays adequate comfort level or baseline comfort level  Description: Interventions:  - Encourage patient to monitor pain and request assistance  - Assess pain using appropriate pain scale  - Administer analgesics based on type and severity of pain and evaluate response  - Implement non-pharmacological measures as appropriate and evaluate response  - Consider cultural and social influences on pain and pain management  - Notify physician/advanced practitioner if interventions unsuccessful or patient reports new pain  Outcome: Progressing     Problem: INFECTION - ADULT  Goal: Absence or prevention of progression during hospitalization  Description: INTERVENTIONS:  - Assess and monitor for signs and symptoms of infection  - Monitor lab/diagnostic results  - Monitor all insertion sites, i.e. indwelling lines, tubes, and drains  - Monitor endotracheal if appropriate and nasal secretions for changes in amount and color  - [place] appropriate cooling/warming therapies per order  - Administer medications as ordered  - Instruct and encourage patient and family to use good hand hygiene technique  - Identify and instruct in appropriate isolation precautions for identified infection/condition  Outcome: Progressing     Problem: SAFETY ADULT  Goal: Patient will remain free of falls  Description: INTERVENTIONS:  - Educate patient/family on patient safety including physical limitations  - Instruct patient to call for assistance with activity   - Consult OT/PT to assist with strengthening/mobility   - Keep Call bell within reach  - Keep bed low and locked with side rails adjusted as appropriate  - Keep care items and personal belongings within reach  - Initiate and maintain comfort rounds  - Make Fall Risk Sign visible to staff  - Offer Toileting every 2 Hours, in advance of need  - Initiate/Maintain bed alarm  - Obtain necessary fall risk management equipment:   - Apply yellow socks and  bracelet for high fall risk patients  - Consider moving patient to room near nurses station  Outcome: Progressing  Goal: Maintain or return to baseline ADL function  Description: INTERVENTIONS:  -  Assess patient's ability to carry out ADLs; assess patient's baseline for ADL function and identify physical deficits which impact ability to perform ADLs (bathing, care of mouth/teeth, toileting, grooming, dressing, etc.)  - Assess/evaluate cause of self-care deficits   - Assess range of motion  - Assess patient's mobility; develop plan if impaired  - Assess patient's need for assistive devices and provide as appropriate  - Encourage maximum independence but intervene and supervise when necessary  - Involve family in performance of ADLs  - Assess for home care needs following discharge   - Consider OT consult to assist with ADL evaluation and planning for discharge  - Provide patient education as appropriate  Outcome: Progressing  Goal: Maintains/Returns to pre admission functional level  Description: INTERVENTIONS:  - Perform AM-PAC 6 Click Basic Mobility/ Daily Activity assessment daily.  - Set and communicate daily mobility goal to care team and patient/family/caregiver.   - Collaborate with rehabilitation services on mobility goals if consulted  - Perform Range of Motion  times a day.  - Reposition patient every  hours.  - Dangle patient  times a day  - Stand patient  times a day  - Ambulate patient  times a day  - Out of bed to chair  times a day   - Out of bed for meals  times a day  - Out of bed for toileting  - Record patient progress and toleration of activity level   Outcome: Progressing     Problem: DISCHARGE PLANNING  Goal: Discharge to home or other facility with appropriate resources  Description: INTERVENTIONS:  - Identify barriers to discharge w/patient and caregiver  - Arrange for needed discharge resources and transportation as appropriate  - Identify discharge learning needs (meds, wound care, etc.)  -  Arrange for interpretive services to assist at discharge as needed  - Refer to Case Management Department for coordinating discharge planning if the patient needs post-hospital services based on physician/advanced practitioner order or complex needs related to functional status, cognitive ability, or social support system  Outcome: Progressing     Problem: Knowledge Deficit  Goal: Patient/family/caregiver demonstrates understanding of disease process, treatment plan, medications, and discharge instructions  Description: Complete learning assessment and assess knowledge base.  Interventions:  - Provide teaching at level of understanding  - Provide teaching via preferred learning methods  Outcome: Progressing     Problem: Prexisting or High Potential for Compromised Skin Integrity  Goal: Skin integrity is maintained or improved  Description: INTERVENTIONS:  - Identify patients at risk for skin breakdown  - Assess and monitor skin integrity  - Assess and monitor nutrition and hydration status  - Monitor labs   - Assess for incontinence   - Turn and reposition patient  - Assist with mobility/ambulation  - Relieve pressure over bony prominences  - Avoid friction and shearing  - Provide appropriate hygiene as needed including keeping skin clean and dry  - Evaluate need for skin moisturizer/barrier cream  - Collaborate with interdisciplinary team   - Patient/family teaching  - Consider wound care consult   Outcome: Progressing

## 2025-01-10 NOTE — PROGRESS NOTES
Progress Note - Infectious Disease   Name: Bart Jasso 84 y.o. female I MRN: [id number]  Unit/Bed#: 2 E 272-01 I Date of Admission: 1/7/2025   Date of Service: 1/10/2025 I Hospital Day: 2    Assessment & Plan  Fever  Patient developed fever up to 101 early in hospitalization.  In the setting of severe bleeding mouth oral ulcerations and pancytopenia, as below.  Of note, patient is from [facility]. Blood cultures are negative thus far.  UA was negative.  CT chest showed right lower lobe bronchial inflammation with no consolidation.  Negative procalcitonin.  No acute findings on CT A/P.  Normal LFTs.  Consideration for viral process, as below.  Fortunately, patient remains hemodynamically stable and nontoxic in appearance. No clinical evidence of encephalitis/meningitis.   Continue to observe closely off antibiotic for now as workup for acute bacterial infection is negative, patient remains clinically stable and last ANC was above 500.   Continue to follow blood cultures until final.   Follow temperatures and hemodynamics.   Check CBC/ANC in AM   Remainder of workup as below.  Mouth pain  Presented with painful mouth ulcerations in the setting of pancytopenia.  Consideration for viral process, as below. Consideration for autoimmune process. Consider drug reaction? No rashes on the skin. Patient was recently started on NSAID and prednisone in [facility] prior to traveling here.  She does have eosinophilia.   Follow-up pending HSV PCR.   Follow-up CMV culture.   Consider dermatology evaluation.   Pancytopenia (HCC)  Presented with significant pancytopenia with platelet count as low as 26, ANC of 660.  Consideration for acute viral process, potentially acquired in [facility]. No transaminitis. Negative HIV, hepatitis panel.  Consideration for acute hematologic process including possible malignancy.  Status post bone marrow biopsy 1/9 with results pending.  Parasite smear is negative.   Follow-up dengue  serologies.  Follow-up CMV/EBV serologies   Follow-up parvovirus serologies.   Follow-up bone marrow biopsy results   Hematology input appreciated  CKD (chronic kidney disease) stage 3, GFR 30-59 ml/min ([facility])  Lab Results   Component Value Date    EGFR 64 01/09/2025    EGFR 56 01/08/2025    EGFR 44 01/07/2025    CREATININE 0.83 01/09/2025    CREATININE 0.93 01/08/2025    CREATININE 1.14 01/07/2025     Elevated troponin  Cardiology input noted.  EKG without acute ischemic changes.  Suspected secondary to atrial fibrillation with RVR in addition to underlying acute process, currently being worked up.        Antibiotics:  None    I discussed above plan with family at bedside, and with primary service who agrees with continued close observation off antibiotics.  Please call us with any immediate questions over the weekend.      Subjective   Patient feels a little better today.  Bleeding from the mouth seems to be subsiding.  She did have a fever up to 102.2 overnight.  No new focal complaints per her family at bedside.    Objective :  Temp:  [96.3 °F (35.7 °C)-102.2 °F (39 °C)] 97.6 °F (36.4 °C)  HR:  [69-94] 70  BP: (103-167)/() 116/56  Resp:  [18] 18  SpO2:  [90 %-99 %] 95 %  O2 Device: None (Room air)    General:  No acute distress, fatigued, nontoxic  HEENT atraumatic normocephalic  Bilateral conjunctival injection, no suffusion, scleral icterus  Persistent oral cankerous lesions.  No visible active bleeding.  Psychiatric:  Awake and alert  Pulmonary:  Normal respiratory excursion without accessory muscle use  Abdomen:  Soft, nontender  Extremities:  No edema  Skin:  No visible skin rashes or wounds  Neuro moves all extremity spontaneously, no meningismus      Lab Results: I have reviewed the following results:  Results from last 7 days   Lab Units 01/10/25  0504 01/09/25  1731 01/09/25  0447   WBC Thousand/uL 1.74* 1.89* 2.29*   HEMOGLOBIN g/dL 9.9* 10.0* 9.9*   PLATELETS Thousands/uL 81* 84* 94*     Results  from last 7 days   Lab Units 01/09/25  0447 01/08/25  0443 01/07/25  1357   SODIUM mmol/L 139 140 140   POTASSIUM mmol/L 3.7 4.1 4.4   CHLORIDE mmol/L 108 109* 107   CO2 mmol/L 27 27 26   BUN mg/dL 19 27* 26*   CREATININE mg/dL 0.83 0.93 1.14   EGFR ml/min/1.73sq m 64 56 44   CALCIUM mg/dL 7.7* 8.1* 8.7   AST U/L  --   --  12*   ALT U/L  --   --  14   ALK PHOS U/L  --   --  68   ALBUMIN g/dL  --   --  3.7     Results from last 7 days   Lab Units 01/07/25  1403 01/07/25  1357   BLOOD CULTURE  No Growth at 48 hrs. No Growth at 48 hrs.     Results from last 7 days   Lab Units 01/07/25  1357   PROCALCITONIN ng/ml <0.05         Results from last 7 days   Lab Units 01/08/25  1338   FERRITIN ng/mL 278

## 2025-01-10 NOTE — ASSESSMENT & PLAN NOTE
Patient developed fever up to 101 early in hospitalization.  In the setting of severe bleeding mouth oral ulcerations and pancytopenia, as below.  Of note, patient is from [facility]. Blood cultures are negative thus far.  UA was negative.  CT chest showed right lower lobe bronchial inflammation with no consolidation.  Negative procalcitonin.  No acute findings on CT A/P.  Normal LFTs.  Consideration for viral process, as below.  Fortunately, patient remains hemodynamically stable and nontoxic in appearance. No clinical evidence of encephalitis/meningitis.   Continue to observe closely off antibiotic for now as workup for acute bacterial infection is negative, patient remains clinically stable and last ANC was above 500.   Continue to follow blood cultures until final.   Follow temperatures and hemodynamics.   Check CBC/ANC in AM   Remainder of workup as below.

## 2025-01-10 NOTE — ASSESSMENT & PLAN NOTE
Patient developed fever and hospitalization.  COVID-19 suspicious RSV negative.  UA negative for UTI.  2 sets of blood culture without growth.  CT chest showed right lower lobe bronchial inflammation and no consolidation.  Procalcitonin negative.  Lipase.  Suspected viral etiology.  ANC 0.6  Patient clinically appears comfortable and hemodynamically stable off antibiotics.  Repeat CBC ordered for tomorrow.

## 2025-01-10 NOTE — ASSESSMENT & PLAN NOTE
84-year-old female patient from [facility] here with multiple problems.  Elevated troponin likely secondary to A-fib RVR.  Troponins are elevated however flat.  Cardiology recommended noncardiac in nature most likely because of underlying infection.

## 2025-01-10 NOTE — PLAN OF CARE
Problem: PAIN - ADULT  Goal: Verbalizes/displays adequate comfort level or baseline comfort level  Description: Interventions:  - Encourage patient to monitor pain and request assistance  - Assess pain using appropriate pain scale  - Administer analgesics based on type and severity of pain and evaluate response  - Implement non-pharmacological measures as appropriate and evaluate response  - Consider cultural and social influences on pain and pain management  - Notify physician/advanced practitioner if interventions unsuccessful or patient reports new pain  Outcome: Progressing     Problem: INFECTION - ADULT  Goal: Absence or prevention of progression during hospitalization  Description: INTERVENTIONS:  - Assess and monitor for signs and symptoms of infection  - Monitor lab/diagnostic results  - Monitor all insertion sites, i.e. indwelling lines, tubes, and drains  - Monitor endotracheal if appropriate and nasal secretions for changes in amount and color  - [place] appropriate cooling/warming therapies per order  - Administer medications as ordered  - Instruct and encourage patient and family to use good hand hygiene technique  - Identify and instruct in appropriate isolation precautions for identified infection/condition  Outcome: Progressing     Problem: SAFETY ADULT  Goal: Patient will remain free of falls  Description: INTERVENTIONS:  - Educate patient/family on patient safety including physical limitations  - Instruct patient to call for assistance with activity   - Consult OT/PT to assist with strengthening/mobility   - Keep Call bell within reach  - Keep bed low and locked with side rails adjusted as appropriate  - Keep care items and personal belongings within reach  - Initiate and maintain comfort rounds  - Make Fall Risk Sign visible to staff  - Offer Toileting every 2 Hours, in advance of need  - Initiate/Maintain 2alarm  - Obtain necessary fall risk management equipment: 2  - Apply yellow socks and  bracelet for high fall risk patients  - Consider moving patient to room near nurses station  Outcome: Progressing  Goal: Maintain or return to baseline ADL function  Description: INTERVENTIONS:  -  Assess patient's ability to carry out ADLs; assess patient's baseline for ADL function and identify physical deficits which impact ability to perform ADLs (bathing, care of mouth/teeth, toileting, grooming, dressing, etc.)  - Assess/evaluate cause of self-care deficits   - Assess range of motion  - Assess patient's mobility; develop plan if impaired  - Assess patient's need for assistive devices and provide as appropriate  - Encourage maximum independence but intervene and supervise when necessary  - Involve family in performance of ADLs  - Assess for home care needs following discharge   - Consider OT consult to assist with ADL evaluation and planning for discharge  - Provide patient education as appropriate  Outcome: Progressing  Goal: Maintains/Returns to pre admission functional level  Description: INTERVENTIONS:  - Perform AM-PAC 6 Click Basic Mobility/ Daily Activity assessment daily.  - Set and communicate daily mobility goal to care team and patient/family/caregiver.   - Collaborate with rehabilitation services on mobility goals if consulted  - Perform Range of Motion 2 times a day.  - Reposition patient every 2 hours.  - Dangle patient 2 times a day  - Stand patient 2 times a day  - Ambulate patient 2 times a day  - Out of bed to chair 2 times a day   - Out of bed for meals 2 times a day  - Out of bed for toileting  - Record patient progress and toleration of activity level   Outcome: Progressing     Problem: DISCHARGE PLANNING  Goal: Discharge to home or other facility with appropriate resources  Description: INTERVENTIONS:  - Identify barriers to discharge w/patient and caregiver  - Arrange for needed discharge resources and transportation as appropriate  - Identify discharge learning needs (meds, wound care,  etc.)  - Arrange for interpretive services to assist at discharge as needed  - Refer to Case Management Department for coordinating discharge planning if the patient needs post-hospital services based on physician/advanced practitioner order or complex needs related to functional status, cognitive ability, or social support system  Outcome: Progressing     Problem: Knowledge Deficit  Goal: Patient/family/caregiver demonstrates understanding of disease process, treatment plan, medications, and discharge instructions  Description: Complete learning assessment and assess knowledge base.  Interventions:  - Provide teaching at level of understanding  - Provide teaching via preferred learning methods  Outcome: Progressing     Problem: Prexisting or High Potential for Compromised Skin Integrity  Goal: Skin integrity is maintained or improved  Description: INTERVENTIONS:  - Identify patients at risk for skin breakdown  - Assess and monitor skin integrity  - Assess and monitor nutrition and hydration status  - Monitor labs   - Assess for incontinence   - Turn and reposition patient  - Assist with mobility/ambulation  - Relieve pressure over bony prominences  - Avoid friction and shearing  - Provide appropriate hygiene as needed including keeping skin clean and dry  - Evaluate need for skin moisturizer/barrier cream  - Collaborate with interdisciplinary team   - Patient/family teaching  - Consider wound care consult   Outcome: Progressing     Problem: GASTROINTESTINAL - ADULT  Goal: Maintains adequate nutritional intake  Description: INTERVENTIONS:  - Monitor percentage of each meal consumed  - Identify factors contributing to decreased intake, treat as appropriate  - Assist with meals as needed  - Monitor I&O, weight, and lab values if indicated  - Obtain nutrition services referral as needed  Outcome: Progressing  Goal: Oral mucous membranes remain intact  Description: INTERVENTIONS  - Assess oral mucosa and hygiene  practices  - Implement preventative oral hygiene regimen  - Implement oral medicated treatments as ordered  - Initiate Nutrition services referral as needed  Outcome: Progressing

## 2025-01-10 NOTE — PROGRESS NOTES
"Progress Note - Hospitalist   Name: Bart Jasos 84 y.o. female I MRN: [id number]  Unit/Bed#: 2 E 272-01 I Date of Admission: 1/7/2025   Date of Service: 1/10/2025 I Hospital Day: 2    Assessment & Plan  Pancytopenia (HCC)  84-year-old female patient from [facility] here with multiple problems.  Elevated troponin likely secondary to A-fib RVR.  Noted pancytopenia, mouth ulcer, odynophagia, chest pain.  Difficult to obtain history since patient primarily speaks native language \"Qu[place] \", patient's daughter speak patient language however daughter does not speak English however grandson is helping contribute to patient's daughter who is helping interpret with the patient.    CBC reviewed and noted with hemoglobin 9.9, platelet 81,000 after unit transfusion yesterday.  Folate deficiency noted.  Follow-up with pending B12, iron panel, peripheral flow cytometry, EBV, CMV, HSV, CMV swab of the lesion, HIV pending, Dengue serologies pending.   S/p bone marrow biopsy on 1/9/2025  ID, heme-onc on board.    Elevated troponin  84-year-old female patient from [facility] here with multiple problems.  Elevated troponin likely secondary to A-fib RVR.  Troponins are elevated however flat.  Cardiology recommended noncardiac in nature most likely because of underlying infection.    Cardiac arrhythmia  Reportedly had A-fib with RVR and outpatient PCP office earlier today  Currently normal sinus rhythm and rate controlled  Episode likely due to underlying infection and since patient is currently maintained normal sinus rhythm currently plan is not to anticoagulate.  Mouth pain  Reports of mouth pain for the past week or 2 with poor oral intake  Patient was given NSAIDs, steroids and [place] prior to traveling here.  Recheck HSV PCR from oral lesion, follow-up with CMP cultures.  ID following.  CKD (chronic kidney disease) stage 3, GFR 30-59 ml/min ([facility])  Lab Results   Component Value Date    EGFR 64 01/09/2025    EGFR 56 01/08/2025 "    EGFR 44 01/07/2025    CREATININE 0.83 01/09/2025    CREATININE 0.93 01/08/2025    CREATININE 1.14 01/07/2025   Currently creatinine stable.  Paroxysmal atrial fibrillation (HCC)  Reportedly had episode of A-fib with RVR with outpatient PCP visit however currently EKG noted with normal sinus rhythm.  Further care per cardiology recommendation.  Fever  Patient developed fever and hospitalization.  COVID-19 suspicious RSV negative.  UA negative for UTI.  2 sets of blood culture without growth.  CT chest showed right lower lobe bronchial inflammation and no consolidation.  Procalcitonin negative.  Lipase.  Suspected viral etiology.  ANC 0.6  Patient clinically appears comfortable and hemodynamically stable off antibiotics.  Repeat CBC ordered for tomorrow.  Aortic regurgitation  Echocardiogram with EF of 52%, mild to moderate AR,  Recommend outpatient follow-up with cardiology.    VTE Pharmacologic Prophylaxis: VTE Score: 6 Moderate Risk (Score 3-4) - Pharmacological DVT Prophylaxis Contraindicated. Sequential Compression Devices Ordered.    Mobility:   Basic Mobility Inpatient Raw Score: 18  JH-HLM Goal: 6: Walk 10 steps or more  JH-HLM Achieved: 7: Walk 25 feet or more      Patient Centered Rounds: I performed bedside rounds with nursing staff today.   Discussions with Specialists or Other Care Team Provider: ID, Hem onc    Education and Discussions with Family / Patient: Updated  (multiple family members) at bedside.    Current Length of Stay: 2 day(s)  Current Patient Status: Inpatient   Certification Statement: The patient will continue to require additional inpatient hospital stay due to Fever, infection and hem onc work up in progress  Discharge Plan: Anticipate discharge in 48-72 hrs to discharge location to be determined pending rehab evaluations.    Code Status: Level 1 - Full Code    Subjective   Seen during a.m. rounds.  Patient had fever up to 102.2 overnight.  Patient is resting  comfortably.  Family member at the bedside reports that she is feeling well overall.  Mouth bleeding seems to be improving.    Objective :  Temp:  [96.3 °F (35.7 °C)-102.2 °F (39 °C)] 99.3 °F (37.4 °C)  HR:  [69-94] 69  BP: (103-169)/() 112/57  Resp:  [18-22] 18  SpO2:  [90 %-99 %] 95 %  O2 Device: None (Room air)  Nasal Cannula O2 Flow Rate (L/min):  [2 L/min] 2 L/min    Body mass index is 22.83 kg/m².     Input and Output Summary (last 24 hours):     Intake/Output Summary (Last 24 hours) at 1/10/2025 0842  Last data filed at 1/10/2025 0306  Gross per 24 hour   Intake --   Output 100 ml   Net -100 ml       Physical Exam  Constitutional:       General: She is not in acute distress.     Appearance: She is not ill-appearing, toxic-appearing or diaphoretic.   HENT:      Mouth/Throat:      Comments: Bleeding mouth sore noted.  Cardiovascular:      Rate and Rhythm: Normal rate.      Pulses: Normal pulses.   Pulmonary:      Effort: Pulmonary effort is normal. No respiratory distress.      Breath sounds: No wheezing.   Abdominal:      General: Bowel sounds are normal. There is no distension.      Palpations: Abdomen is soft.      Tenderness: There is no abdominal tenderness.   Neurological:      Mental Status: She is alert. Mental status is at baseline.   Psychiatric:         Mood and Affect: Mood normal.         Behavior: Behavior normal.           Lines/Drains:              Lab Results: I have reviewed the following results:   Results from last 7 days   Lab Units 01/10/25  0504 01/09/25 0447 01/08/25  0443   WBC Thousand/uL 1.74*   < > 2.26*   HEMOGLOBIN g/dL 9.9*   < > 10.2*   HEMATOCRIT % 29.5*   < > 30.7*   PLATELETS Thousands/uL 81*   < > 26*   SEGS PCT %  --   --  29*   LYMPHO PCT %  --   --  64*   MONO PCT %  --   --  0*   EOS PCT %  --   --  7*    < > = values in this interval not displayed.     Results from last 7 days   Lab Units 01/09/25  0447 01/08/25  0443 01/07/25  1357   SODIUM mmol/L 139   < > 140    POTASSIUM mmol/L 3.7   < > 4.4   CHLORIDE mmol/L 108   < > 107   CO2 mmol/L 27   < > 26   BUN mg/dL 19   < > 26*   CREATININE mg/dL 0.83   < > 1.14   ANION GAP mmol/L 4   < > 7   CALCIUM mg/dL 7.7*   < > 8.7   ALBUMIN g/dL  --   --  3.7   TOTAL BILIRUBIN mg/dL  --   --  0.89   ALK PHOS U/L  --   --  68   ALT U/L  --   --  14   AST U/L  --   --  12*   GLUCOSE RANDOM mg/dL 101   < > 114    < > = values in this interval not displayed.     Results from last 7 days   Lab Units 01/07/25  1357   INR  1.13             Results from last 7 days   Lab Units 01/07/25  1357   LACTIC ACID mmol/L 1.1   PROCALCITONIN ng/ml <0.05       Recent Cultures (last 7 days):   Results from last 7 days   Lab Units 01/07/25  1403 01/07/25  1357   BLOOD CULTURE  No Growth at 48 hrs. No Growth at 48 hrs.         Last 24 Hours Medication List:     Current Facility-Administered Medications:     acetaminophen (TYLENOL) tablet 650 mg, Q6H PRN    albuterol (PROVENTIL HFA,VENTOLIN HFA) inhaler 2 puff, Q6H PRN    diphenhydramine, lidocaine, Al/Mg hydroxide, simethicone (Magic Mouthwash) oral solution 10 mL, Q4H PRN    fluticasone (FLONASE) 50 mcg/act nasal spray 1 spray, Daily    folic acid (FOLVITE) tablet 1 mg, Daily    Administrative Statements   Today, Patient Was Seen By: Garret Singh MD  I have spent a total time of 35 minutes in caring for this patient on the day of the visit/encounter including Diagnostic results, Patient and family education, Risk factor reductions, Counseling / Coordination of care, Documenting in the medical record, Reviewing / ordering tests, medicine, procedures  , and Obtaining or reviewing history  .    **Please Note: This note may have been constructed using a voice recognition system.**

## 2025-01-10 NOTE — TELEPHONE ENCOUNTER
New Patient Intake Form   Patient Details:    Bart Jasso  1940    Appointment Information   Who is calling to schedule? Opal Jacobs   If not self, what is the caller's name?   NA   DID YOU CONFIRM INSURANCE WITH PATIENT? NOLAN SAMSON Pending, Routed to finance   Referring provider MAKAYLA Sellers   What is the diagnosis?  Pancytopenia, Mouth pain, CKD (chronic kidney disease) stage 3, Paroxysmal atrial fibrillation     Is there a confirmed tissue diagnosis?   BMBX taken on 1/9     Is there a biopsy ordered or pending?  Please specify dates  If yes, route to NN/OCC   See above     Is patient aware of diagnosis?   Yes     Have you had any imaging or labs done?  If yes, where?  (If imaging done outside of [facility], please remind patient to bring a disk.) Yes-[facility]     If imaging done at outside facility, did you instruct patient to obtain discs and bring to visit? NA   Have you been seen by another Oncologist/Hematologist?  If so, who and where? No   Are the records in [facility] or Care Everywhere? Yes   Does the patient have records at another facility/hospital?    If yes, Name of facility, city and state where facility is located. No     Did you instruct patient to have records faxed to rightx and provide rightfax number?   NA   Preferred [place]   Is the patient willing to be seen by another provider?  (This is for breast patients only) NA     Did you send new patient paperwork?  Email or mail? NA   Miscellaneous Information: The patient is scheduled for her HFU appointment with Dr. Newby on 1/29 at 1100 in the [place] office.

## 2025-01-10 NOTE — ASSESSMENT & PLAN NOTE
Presented with painful mouth ulcerations in the setting of pancytopenia.  Consideration for viral process, as below. Consideration for autoimmune process. Consider drug reaction? No rashes on the skin. Patient was recently started on NSAID and prednisone in [facility] prior to traveling here.  She does have eosinophilia.   Follow-up pending HSV PCR.   Follow-up CMV culture.   Consider dermatology evaluation.

## 2025-01-10 NOTE — ASSESSMENT & PLAN NOTE
"84-year-old female patient from [facility] here with multiple problems.  Elevated troponin likely secondary to A-fib RVR.  Noted pancytopenia, mouth ulcer, odynophagia, chest pain.  Difficult to obtain history since patient primarily speaks native language \"Qu[place] \", patient's daughter speak patient language however daughter does not speak English however grandson is helping contribute to patient's daughter who is helping interpret with the patient.    CBC reviewed and noted with hemoglobin 9.9, platelet 81,000 after unit transfusion yesterday.  Folate deficiency noted.  Follow-up with pending B12, iron panel, peripheral flow cytometry, EBV, CMV, HSV, CMV swab of the lesion, HIV pending, Dengue serologies pending.   S/p bone marrow biopsy on 1/9/2025  ID, heme-onc on board.    "

## 2025-01-11 LAB
CMV SPEC QL CULT: NORMAL
ERYTHROCYTE [DISTWIDTH] IN BLOOD BY AUTOMATED COUNT: 14.3 % (ref 11.6–15.1)
HCT VFR BLD AUTO: 30.8 % (ref 34.8–46.1)
HGB BLD-MCNC: 10 G/DL (ref 11.5–15.4)
MCH RBC QN AUTO: 28.6 PG (ref 26.8–34.3)
MCHC RBC AUTO-ENTMCNC: 32.5 G/DL (ref 31.4–37.4)
MCV RBC AUTO: 88 FL (ref 82–98)
PLATELET # BLD AUTO: 114 THOUSANDS/UL (ref 149–390)
PMV BLD AUTO: 10.4 FL (ref 8.9–12.7)
RBC # BLD AUTO: 3.5 MILLION/UL (ref 3.81–5.12)
WBC # BLD AUTO: 2.2 THOUSAND/UL (ref 4.31–10.16)

## 2025-01-11 PROCEDURE — 99232 SBSQ HOSP IP/OBS MODERATE 35: CPT | Performed by: STUDENT IN AN ORGANIZED HEALTH CARE EDUCATION/TRAINING PROGRAM

## 2025-01-11 PROCEDURE — 85027 COMPLETE CBC AUTOMATED: CPT | Performed by: STUDENT IN AN ORGANIZED HEALTH CARE EDUCATION/TRAINING PROGRAM

## 2025-01-11 RX ORDER — TRAMADOL HYDROCHLORIDE 50 MG/1
50 TABLET ORAL EVERY 6 HOURS PRN
Status: DISCONTINUED | OUTPATIENT
Start: 2025-01-11 | End: 2025-01-15

## 2025-01-11 RX ADMIN — LIDOCAINE HYDROCHLORIDE 10 ML: 20 SOLUTION ORAL; TOPICAL at 03:28

## 2025-01-11 RX ADMIN — LIDOCAINE HYDROCHLORIDE 10 ML: 20 SOLUTION ORAL; TOPICAL at 08:35

## 2025-01-11 RX ADMIN — TRAMADOL HYDROCHLORIDE 50 MG: 50 TABLET, COATED ORAL at 08:28

## 2025-01-11 RX ADMIN — FLUTICASONE PROPIONATE 1 SPRAY: 50 SPRAY, METERED NASAL at 08:34

## 2025-01-11 RX ADMIN — FOLIC ACID 1 MG: 1 TABLET ORAL at 08:28

## 2025-01-11 RX ADMIN — ACETAMINOPHEN 650 MG: 325 TABLET, FILM COATED ORAL at 03:34

## 2025-01-11 NOTE — ASSESSMENT & PLAN NOTE
84[facility].  Elevated troponin likely secondary to A-fib RVR.  Troponins are elevated however flat.  Cardiology recommended noncardiac in nature most likely because of underlying infection.  Currently recommend outpatient follow-up and ambulatory event monitor.  Cardiology sign off

## 2025-01-11 NOTE — ASSESSMENT & PLAN NOTE
Patient developed fever and hospitalization.  COVID-19 suspicious RSV negative.  UA negative for UTI.  2 sets of blood culture without growth.  CT chest showed right lower lobe bronchial inflammation and no consolidation.  Procalcitonin negative.  Suspected viral etiology.  Wbc improving  Patient clinically appears comfortable and hemodynamically stable off antibiotics.  Repeat CBC ordered for tomorrow.

## 2025-01-11 NOTE — ASSESSMENT & PLAN NOTE
Reports of mouth pain for the past week or 2 with poor oral intake  Patient was given NSAIDs, steroids and [place] prior to traveling here.  Continue mouthwash  ID following.

## 2025-01-11 NOTE — PROGRESS NOTES
"Progress Note - Hospitalist   Name: Bart Jasso 84 y.o. female I MRN: [id number]  Unit/Bed#: 2 E 272-01 I Date of Admission: 1/7/2025   Date of Service: 1/11/2025 I Hospital Day: 3    Assessment & Plan  Pancytopenia (HCC)  84-year-old female patient from [facility] here with multiple problems.  Elevated troponin likely secondary to A-fib RVR.  Noted pancytopenia, mouth ulcer, odynophagia, chest pain.  Difficult to obtain history since patient primarily speaks native language \"Qu[place] \", patient's daughter speak patient language however daughter does not speak English however grandson is helping contribute to patient's daughter who is helping interpret with the patient.    CBC reviewed and noted with hemoglobin 10,  platelet 114,000   Had received PLT transfusion on 1/8/2025.  Folate deficiency noted.  COVID-19 IgG, IgM negative.  HSV PCR negative.  CMV culture negative.  Chronic hepatitis negative.  HIV negative.  Leukemia/lymphoma currently pending,  S/p bone marrow biopsy on 1/9/2025 pathology report pending.  ID, heme-onc on board.    Elevated troponin  84-year-old female patient from [facility] here with multiple problems.  Elevated troponin likely secondary to A-fib RVR.  Troponins are elevated however flat.  Cardiology recommended noncardiac in nature most likely because of underlying infection.  Currently recommend outpatient follow-up and ambulatory event monitor.  Cardiology sign off    Cardiac arrhythmia  Reportedly had A-fib with RVR and outpatient PCP office earlier today  Currently normal sinus rhythm and rate controlled  Episode likely due to underlying infection and since patient is currently maintained normal sinus rhythm currently plan is not to anticoagulate.  Outpatient event monitor and outpatient follow-up with cardiology.  Mouth pain  Reports of mouth pain for the past week or 2 with poor oral intake  Patient was given NSAIDs, steroids and [place] prior to traveling here.  Continue " mouthwash  ID following.  CKD (chronic kidney disease) stage 3, GFR 30-59 ml/min ([facility])  Lab Results   Component Value Date    EGFR 64 01/09/2025    EGFR 56 01/08/2025    EGFR 44 01/07/2025    CREATININE 0.83 01/09/2025    CREATININE 0.93 01/08/2025    CREATININE 1.14 01/07/2025   Currently creatinine stable.  Paroxysmal atrial fibrillation (HCC)  Reportedly had episode of A-fib with RVR with outpatient PCP visit however currently EKG noted with normal sinus rhythm.  Further care per cardiology recommendation.  Fever  Patient developed fever and hospitalization.  COVID-19 suspicious RSV negative.  UA negative for UTI.  2 sets of blood culture without growth.  CT chest showed right lower lobe bronchial inflammation and no consolidation.  Procalcitonin negative.  Suspected viral etiology.  Wbc improving  Patient clinically appears comfortable and hemodynamically stable off antibiotics.  Repeat CBC ordered for tomorrow.  Aortic regurgitation  Echocardiogram with EF of 52%, mild to moderate AR   Recommend outpatient follow-up with cardiology.    VTE Pharmacologic Prophylaxis: VTE Score: 6 Moderate Risk (Score 3-4) - Pharmacological DVT Prophylaxis Ordered: apixaban (Eliquis).    Mobility:   Basic Mobility Inpatient Raw Score: 18  JH-HLM Goal: 6: Walk 10 steps or more  JH-HLM Achieved: 5: Stand (1 or more minutes)      Patient Centered Rounds: I performed bedside rounds with nursing staff today.     Education and Discussions with Family / Patient: Updated  (multiple family members) at bedside.    Current Length of Stay: 3 day(s)  Current Patient Status: Inpatient   Certification Statement: The patient will continue to require additional inpatient hospital stay due to pancytopenia  Discharge Plan: Anticipate discharge in 48 hrs to home with home services.    Code Status: Level 1 - Full Code    Subjective   Seen during a.m. rounds.  Patient appears comfortable not in distress.  Family reports that patient has  poor p.o. intake due to pain in her mouth.  Overall they report that she is appear to be getting better.  No other events reported.    Objective :  Temp:  [97.9 °F (36.6 °C)-98.9 °F (37.2 °C)] 98.9 °F (37.2 °C)  HR:  [69-88] 88  BP: ()/(48-74) 115/74  Resp:  [18] 18  SpO2:  [92 %-96 %] 92 %  O2 Device: None (Room air)    Body mass index is 22.83 kg/m².     Input and Output Summary (last 24 hours):     Intake/Output Summary (Last 24 hours) at 1/11/2025 0816  Last data filed at 1/10/2025 1942  Gross per 24 hour   Intake 580 ml   Output --   Net 580 ml       Physical Exam  Constitutional:       General: She is not in acute distress.     Appearance: She is not ill-appearing, toxic-appearing or diaphoretic.   Cardiovascular:      Rate and Rhythm: Normal rate.      Pulses: Normal pulses.   Pulmonary:      Effort: Pulmonary effort is normal. No respiratory distress.      Breath sounds: No wheezing.   Abdominal:      General: Bowel sounds are normal. There is no distension.      Palpations: Abdomen is soft.      Tenderness: There is no abdominal tenderness.   Neurological:      Mental Status: She is alert. Mental status is at baseline.   Psychiatric:         Mood and Affect: Mood normal.         Behavior: Behavior normal.           Lines/Drains:              Lab Results: I have reviewed the following results:   Results from last 7 days   Lab Units 01/11/25  0602 01/10/25  1123 01/09/25 0447 01/08/25  0443   WBC Thousand/uL 2.20*  --    < > 2.26*   HEMOGLOBIN g/dL 10.0*  --    < > 10.2*   HEMATOCRIT % 30.8*  --    < > 30.7*   PLATELETS Thousands/uL 114*  --    < > 26*   BANDS PCT %  --  2  --   --    SEGS PCT %  --   --   --  29*   LYMPHO PCT %  --  62  --  64*   MONO PCT %  --  5  --  0*   EOS PCT %  --  17*  --  7*    < > = values in this interval not displayed.     Results from last 7 days   Lab Units 01/09/25  0447 01/08/25  0443 01/07/25  1357   SODIUM mmol/L 139   < > 140   POTASSIUM mmol/L 3.7   < > 4.4    CHLORIDE mmol/L 108   < > 107   CO2 mmol/L 27   < > 26   BUN mg/dL 19   < > 26*   CREATININE mg/dL 0.83   < > 1.14   ANION GAP mmol/L 4   < > 7   CALCIUM mg/dL 7.7*   < > 8.7   ALBUMIN g/dL  --   --  3.7   TOTAL BILIRUBIN mg/dL  --   --  0.89   ALK PHOS U/L  --   --  68   ALT U/L  --   --  14   AST U/L  --   --  12*   GLUCOSE RANDOM mg/dL 101   < > 114    < > = values in this interval not displayed.     Results from last 7 days   Lab Units 01/07/25  1357   INR  1.13             Results from last 7 days   Lab Units 01/07/25  1357   LACTIC ACID mmol/L 1.1   PROCALCITONIN ng/ml <0.05       Recent Cultures (last 7 days):   Results from last 7 days   Lab Units 01/07/25  1403 01/07/25  1357   BLOOD CULTURE  No Growth at 72 hrs. No Growth at 72 hrs.             Last 24 Hours Medication List:     Current Facility-Administered Medications:     acetaminophen (TYLENOL) tablet 650 mg, Q6H PRN    albuterol (PROVENTIL HFA,VENTOLIN HFA) inhaler 2 puff, Q6H PRN    diphenhydramine, lidocaine, Al/Mg hydroxide, simethicone (Magic Mouthwash) oral solution 10 mL, Q4H PRN    fluticasone (FLONASE) 50 mcg/act nasal spray 1 spray, Daily    folic acid (FOLVITE) tablet 1 mg, Daily    traMADol (ULTRAM) tablet 50 mg, Q6H PRN    Administrative Statements   Today, Patient Was Seen By: Garret Singh MD  I have spent a total time of 35 minutes in caring for this patient on the day of the visit/encounter including Patient and family education, Impressions, Counseling / Coordination of care, Documenting in the medical record, Reviewing / ordering tests, medicine, procedures  , and Communicating with other healthcare professionals .    **Please Note: This note may have been constructed using a voice recognition system.**

## 2025-01-11 NOTE — ASSESSMENT & PLAN NOTE
Echocardiogram with EF of 52%, mild to moderate AR   Recommend outpatient follow-up with cardiology.

## 2025-01-11 NOTE — ASSESSMENT & PLAN NOTE
"84-year-old female patient from [facility] here with multiple problems.  Elevated troponin likely secondary to A-fib RVR.  Noted pancytopenia, mouth ulcer, odynophagia, chest pain.  Difficult to obtain history since patient primarily speaks native language \"Qu[place] \", patient's daughter speak patient language however daughter does not speak English however grandson is helping contribute to patient's daughter who is helping interpret with the patient.    CBC reviewed and noted with hemoglobin 10,  platelet 114,000   Had received PLT transfusion on 1/8/2025.  Folate deficiency noted.  COVID-19 IgG, IgM negative.  HSV PCR negative.  CMV culture negative.  Chronic hepatitis negative.  HIV negative.  Leukemia/lymphoma currently pending,  S/p bone marrow biopsy on 1/9/2025 pathology report pending.  ID, heme-onc on board.    "

## 2025-01-11 NOTE — ASSESSMENT & PLAN NOTE
Reportedly had A-fib with RVR and outpatient PCP office earlier today  Currently normal sinus rhythm and rate controlled  Episode likely due to underlying infection and since patient is currently maintained normal sinus rhythm currently plan is not to anticoagulate.  Outpatient event monitor and outpatient follow-up with cardiology.

## 2025-01-11 NOTE — PLAN OF CARE
Problem: PAIN - ADULT  Goal: Verbalizes/displays adequate comfort level or baseline comfort level  Description: Interventions:  - Encourage patient to monitor pain and request assistance  - Assess pain using appropriate pain scale  - Administer analgesics based on type and severity of pain and evaluate response  - Implement non-pharmacological measures as appropriate and evaluate response  - Consider cultural and social influences on pain and pain management  - Notify physician/advanced practitioner if interventions unsuccessful or patient reports new pain  Outcome: Progressing     Problem: INFECTION - ADULT  Goal: Absence or prevention of progression during hospitalization  Description: INTERVENTIONS:  - Assess and monitor for signs and symptoms of infection  - Monitor lab/diagnostic results  - Monitor all insertion sites, i.e. indwelling lines, tubes, and drains  - Monitor endotracheal if appropriate and nasal secretions for changes in amount and color  - [place] appropriate cooling/warming therapies per order  - Administer medications as ordered  - Instruct and encourage patient and family to use good hand hygiene technique  - Identify and instruct in appropriate isolation precautions for identified infection/condition  Outcome: Progressing

## 2025-01-12 LAB
BACTERIA BLD CULT: NORMAL
BACTERIA BLD CULT: NORMAL
BASOPHILS # BLD AUTO: 0.02 THOUSANDS/ΜL (ref 0–0.1)
BASOPHILS NFR BLD AUTO: 1 % (ref 0–1)
EOSINOPHIL # BLD AUTO: 0.22 THOUSAND/ΜL (ref 0–0.61)
EOSINOPHIL NFR BLD AUTO: 6 % (ref 0–6)
ERYTHROCYTE [DISTWIDTH] IN BLOOD BY AUTOMATED COUNT: 14.6 % (ref 11.6–15.1)
HCT VFR BLD AUTO: 29.2 % (ref 34.8–46.1)
HGB BLD-MCNC: 9.3 G/DL (ref 11.5–15.4)
IMM GRANULOCYTES # BLD AUTO: 0.07 THOUSAND/UL (ref 0–0.2)
IMM GRANULOCYTES NFR BLD AUTO: 2 % (ref 0–2)
LYMPHOCYTES # BLD AUTO: 1.48 THOUSANDS/ΜL (ref 0.6–4.47)
LYMPHOCYTES NFR BLD AUTO: 41 % (ref 14–44)
MCH RBC QN AUTO: 28.2 PG (ref 26.8–34.3)
MCHC RBC AUTO-ENTMCNC: 31.8 G/DL (ref 31.4–37.4)
MCV RBC AUTO: 89 FL (ref 82–98)
MONOCYTES # BLD AUTO: 0.58 THOUSAND/ΜL (ref 0.17–1.22)
MONOCYTES NFR BLD AUTO: 16 % (ref 4–12)
NEUTROPHILS # BLD AUTO: 1.22 THOUSANDS/ΜL (ref 1.85–7.62)
NEUTS SEG NFR BLD AUTO: 34 % (ref 43–75)
NRBC BLD AUTO-RTO: 0 /100 WBCS
PLATELET # BLD AUTO: 203 THOUSANDS/UL (ref 149–390)
PMV BLD AUTO: 10.3 FL (ref 8.9–12.7)
RBC # BLD AUTO: 3.3 MILLION/UL (ref 3.81–5.12)
WBC # BLD AUTO: 3.59 THOUSAND/UL (ref 4.31–10.16)

## 2025-01-12 PROCEDURE — 99232 SBSQ HOSP IP/OBS MODERATE 35: CPT | Performed by: STUDENT IN AN ORGANIZED HEALTH CARE EDUCATION/TRAINING PROGRAM

## 2025-01-12 PROCEDURE — 85025 COMPLETE CBC W/AUTO DIFF WBC: CPT | Performed by: STUDENT IN AN ORGANIZED HEALTH CARE EDUCATION/TRAINING PROGRAM

## 2025-01-12 RX ORDER — LIDOCAINE HYDROCHLORIDE 20 MG/ML
15 SOLUTION OROPHARYNGEAL 4 TIMES DAILY PRN
Status: DISCONTINUED | OUTPATIENT
Start: 2025-01-12 | End: 2025-01-15

## 2025-01-12 RX ORDER — ALBUMIN (HUMAN) 12.5 G/50ML
50 SOLUTION INTRAVENOUS ONCE
Status: COMPLETED | OUTPATIENT
Start: 2025-01-12 | End: 2025-01-13

## 2025-01-12 RX ADMIN — TRAMADOL HYDROCHLORIDE 50 MG: 50 TABLET, COATED ORAL at 21:08

## 2025-01-12 RX ADMIN — FOLIC ACID 1 MG: 1 TABLET ORAL at 09:07

## 2025-01-12 RX ADMIN — ACETAMINOPHEN 650 MG: 325 TABLET, FILM COATED ORAL at 05:22

## 2025-01-12 RX ADMIN — SODIUM CHLORIDE 1000 ML: 0.9 INJECTION, SOLUTION INTRAVENOUS at 16:26

## 2025-01-12 RX ADMIN — LIDOCAINE HYDROCHLORIDE 10 ML: 20 SOLUTION ORAL; TOPICAL at 13:30

## 2025-01-12 RX ADMIN — LIDOCAINE HYDROCHLORIDE 15 ML: 20 SOLUTION ORAL at 16:56

## 2025-01-12 RX ADMIN — ALBUMIN (HUMAN) 50 G: 0.25 INJECTION, SOLUTION INTRAVENOUS at 15:37

## 2025-01-12 RX ADMIN — LIDOCAINE HYDROCHLORIDE 10 ML: 20 SOLUTION ORAL; TOPICAL at 09:07

## 2025-01-12 RX ADMIN — FLUTICASONE PROPIONATE 1 SPRAY: 50 SPRAY, METERED NASAL at 09:07

## 2025-01-12 NOTE — NURSING NOTE
Pt c/o 5/10 pain to R elbow, area red, swollen with drainage. Picture uploaded to chart, area outlined to monitor, SLIM aware.

## 2025-01-12 NOTE — ASSESSMENT & PLAN NOTE
Reports of mouth pain for the past week or 2 with poor oral intake  Patient was given NSAIDs, steroids and [place] prior to traveling here.  She does have mild sores and ulcers.  Family morning of poor p.o. intake due to mouth pain from ulcers.  Started on viscous lidocaine as needed.  ID following.

## 2025-01-12 NOTE — ASSESSMENT & PLAN NOTE
"84-year-old female patient from [facility] here with multiple problems.  Elevated troponin likely secondary to A-fib RVR.  Noted pancytopenia, mouth ulcer, odynophagia, chest pain.  Difficult to obtain history since patient primarily speaks native language \"Qu[place] \", patient's daughter speak patient language however daughter does not speak English however grandson is helping contribute to patient's daughter who is helping interpret with the patient.    Had received PLT transfusion on 1/8/2025.  Folate deficiency noted.  COVID/FLU/RSV  negative  HSV PCR in precess  CMV culture negative.  Chronic hepatitis negative.  HIV negative.  Dangue serology in process  Leukemia/lymphoma currently pending,  S/p bone marrow biopsy on 1/9/2025 pathology report pending.  Currently WBC improving to 2.59, hemoglobin 9.3, platelet 203.  Pancytopenia improving day by day.  ID, heme-onc on board.    "

## 2025-01-12 NOTE — PLAN OF CARE
Problem: PAIN - ADULT  Goal: Verbalizes/displays adequate comfort level or baseline comfort level  Description: Interventions:  - Encourage patient to monitor pain and request assistance  - Assess pain using appropriate pain scale  - Administer analgesics based on type and severity of pain and evaluate response  - Implement non-pharmacological measures as appropriate and evaluate response  - Consider cultural and social influences on pain and pain management  - Notify physician/advanced practitioner if interventions unsuccessful or patient reports new pain  Outcome: Progressing     Problem: INFECTION - ADULT  Goal: Absence or prevention of progression during hospitalization  Description: INTERVENTIONS:  - Assess and monitor for signs and symptoms of infection  - Monitor lab/diagnostic results  - Monitor all insertion sites, i.e. indwelling lines, tubes, and drains  - Monitor endotracheal if appropriate and nasal secretions for changes in amount and color  - [place] appropriate cooling/warming therapies per order  - Administer medications as ordered  - Instruct and encourage patient and family to use good hand hygiene technique  - Identify and instruct in appropriate isolation precautions for identified infection/condition  Outcome: Progressing     Problem: SAFETY ADULT  Goal: Patient will remain free of falls  Description: INTERVENTIONS:  - Educate patient/family on patient safety including physical limitations  - Instruct patient to call for assistance with activity   - Consult OT/PT to assist with strengthening/mobility   - Keep Call bell within reach  - Keep bed low and locked with side rails adjusted as appropriate  - Keep care items and personal belongings within reach  - Initiate and maintain comfort rounds  - Make Fall Risk Sign visible to staff  - Offer Toileting every 2 Hours, in advance of need  - Initiate/Maintain bed alarm  - Obtain necessary fall risk management equipment: bed alarm  - Apply yellow  socks and bracelet for high fall risk patients  - Consider moving patient to room near nurses station  Outcome: Progressing  Goal: Maintain or return to baseline ADL function  Description: INTERVENTIONS:  -  Assess patient's ability to carry out ADLs; assess patient's baseline for ADL function and identify physical deficits which impact ability to perform ADLs (bathing, care of mouth/teeth, toileting, grooming, dressing, etc.)  - Assess/evaluate cause of self-care deficits   - Assess range of motion  - Assess patient's mobility; develop plan if impaired  - Assess patient's need for assistive devices and provide as appropriate  - Encourage maximum independence but intervene and supervise when necessary  - Involve family in performance of ADLs  - Assess for home care needs following discharge   - Consider OT consult to assist with ADL evaluation and planning for discharge  - Provide patient education as appropriate  Outcome: Progressing  Goal: Maintains/Returns to pre admission functional level  Description: INTERVENTIONS:  - Perform AM-PAC 6 Click Basic Mobility/ Daily Activity assessment daily.  - Set and communicate daily mobility goal to care team and patient/family/caregiver.   - Collaborate with rehabilitation services on mobility goals if consulted  - Perform Range of Motion 3 times a day.  - Reposition patient every 2 hours.  - Dangle patient 3 times a day  - Stand patient 3 times a day  - Ambulate patient 3 times a day  - Out of bed to chair 3 times a day   - Out of bed for meals 3 times a day  - Out of bed for toileting  - Record patient progress and toleration of activity level   Outcome: Progressing     Problem: DISCHARGE PLANNING  Goal: Discharge to home or other facility with appropriate resources  Description: INTERVENTIONS:  - Identify barriers to discharge w/patient and caregiver  - Arrange for needed discharge resources and transportation as appropriate  - Identify discharge learning needs (meds,  wound care, etc.)  - Arrange for interpretive services to assist at discharge as needed  - Refer to Case Management Department for coordinating discharge planning if the patient needs post-hospital services based on physician/advanced practitioner order or complex needs related to functional status, cognitive ability, or social support system  Outcome: Progressing     Problem: Knowledge Deficit  Goal: Patient/family/caregiver demonstrates understanding of disease process, treatment plan, medications, and discharge instructions  Description: Complete learning assessment and assess knowledge base.  Interventions:  - Provide teaching at level of understanding  - Provide teaching via preferred learning methods  Outcome: Progressing     Problem: Prexisting or High Potential for Compromised Skin Integrity  Goal: Skin integrity is maintained or improved  Description: INTERVENTIONS:  - Identify patients at risk for skin breakdown  - Assess and monitor skin integrity  - Assess and monitor nutrition and hydration status  - Monitor labs   - Assess for incontinence   - Turn and reposition patient  - Assist with mobility/ambulation  - Relieve pressure over bony prominences  - Avoid friction and shearing  - Provide appropriate hygiene as needed including keeping skin clean and dry  - Evaluate need for skin moisturizer/barrier cream  - Collaborate with interdisciplinary team   - Patient/family teaching  - Consider wound care consult   Outcome: Progressing     Problem: GASTROINTESTINAL - ADULT  Goal: Maintains adequate nutritional intake  Description: INTERVENTIONS:  - Monitor percentage of each meal consumed  - Identify factors contributing to decreased intake, treat as appropriate  - Assist with meals as needed  - Monitor I&O, weight, and lab values if indicated  - Obtain nutrition services referral as needed  Outcome: Progressing  Goal: Oral mucous membranes remain intact  Description: INTERVENTIONS  - Assess oral mucosa and  hygiene practices  - Implement preventative oral hygiene regimen  - Implement oral medicated treatments as ordered  - Initiate Nutrition services referral as needed  Outcome: Progressing

## 2025-01-12 NOTE — PROGRESS NOTES
"Progress Note - Hospitalist   Name: Bart Jasso 84 y.o. female I MRN: [id number]  Unit/Bed#: 2 E 272-01 I Date of Admission: 1/7/2025   Date of Service: 1/12/2025 I Hospital Day: 4    Assessment & Plan  Pancytopenia (HCC)  84-year-old female patient from [facility] here with multiple problems.  Elevated troponin likely secondary to A-fib RVR.  Noted pancytopenia, mouth ulcer, odynophagia, chest pain.  Difficult to obtain history since patient primarily speaks native language \"[place] \", patient's daughter speak patient language however daughter does not speak English however grandson is helping contribute to patient's daughter who is helping interpret with the patient.    Had received PLT transfusion on 1/8/2025.  Folate deficiency noted.  COVID/FLU/RSV  negative  HSV PCR in precess  CMV culture negative.  Chronic hepatitis negative.  HIV negative.  [place] serology in process  Leukemia/lymphoma currently pending,  S/p bone marrow biopsy on 1/9/2025 pathology report pending.  Currently WBC improving to 2.59, hemoglobin 9.3, platelet 203.  Pancytopenia improving day by day.  ID, heme-onc on board.    Elevated troponin  84-year-old female patient from [facility] here with multiple problems.  Elevated troponin likely secondary to A-fib RVR.  Troponins are elevated however flat.  Cardiology recommended noncardiac in nature most likely because of underlying infection.  Currently recommend outpatient follow-up and ambulatory event monitor.  Cardiology sign off    Cardiac arrhythmia  Reportedly had A-fib with RVR and outpatient PCP office earlier today  Currently normal sinus rhythm and rate controlled  Episode likely due to underlying infection and since patient is currently maintained normal sinus rhythm currently plan is not to anticoagulate.  Outpatient event monitor and outpatient follow-up with cardiology.  Mouth pain  Reports of mouth pain for the past week or 2 with poor oral intake  Patient was given NSAIDs, " steroids and [place] prior to traveling here.  She does have mild sores and ulcers.  Family morning of poor p.o. intake due to mouth pain from ulcers.  Started on viscous lidocaine as needed.  ID following.  CKD (chronic kidney disease) stage 3, GFR 30-59 ml/min ([facility])  Lab Results   Component Value Date    EGFR 64 01/09/2025    EGFR 56 01/08/2025    EGFR 44 01/07/2025    CREATININE 0.83 01/09/2025    CREATININE 0.93 01/08/2025    CREATININE 1.14 01/07/2025   Currently creatinine stable.  Paroxysmal atrial fibrillation (HCC)  Reportedly had episode of A-fib with RVR with outpatient PCP visit however currently EKG noted with normal sinus rhythm.  Further care per cardiology recommendation.  Fever  Patient developed fever and hospitalization.  COVID-19 suspicious RSV negative.  UA negative for UTI.  2 sets of blood culture without growth.  CT chest showed right lower lobe bronchial inflammation and no consolidation.  Procalcitonin negative.  Suspected viral etiology.  Wbc improving  Patient clinically appears comfortable and hemodynamically stable off antibiotics.  Repeat CBC ordered for tomorrow.  Aortic regurgitation  Echocardiogram with EF of 52%, mild to moderate AR   Recommend outpatient follow-up with cardiology.    VTE Pharmacologic Prophylaxis: VTE Score: 6 Moderate Risk (Score 3-4) - Pharmacological DVT Prophylaxis Contraindicated. Sequential Compression Devices Ordered.    Mobility:   Basic Mobility Inpatient Raw Score: 16  JH-HLM Goal: 5: Stand one or more mins  -HLM Achieved: 2: Bed activities/Dependent transfer      Patient Centered Rounds: I performed bedside rounds with nursing staff today.   Education and Discussions with Family / Patient: Updated  (multiple family memebers) at bedside.    Current Length of Stay: 4 day(s)  Current Patient Status: Inpatient   Certification Statement: The patient will continue to require additional inpatient hospital stay due to mouth ulcers,  pancytopenia  Discharge Plan: Anticipate discharge in 24-48 hrs to home with home services.    Code Status: Level 1 - Full Code    Subjective   Seen during a.m. rounds.  Family reports that patient is overall doing better however still complaining of mild pain and poor p.o. intake.  No other events reported.    Objective :  Temp:  [100.3 °F (37.9 °C)] 100.3 °F (37.9 °C)  HR:  [61-88] 83  BP: ()/(39-74) 110/47  Resp:  [18-20] 20  SpO2:  [92 %-97 %] 95 %    Body mass index is 22.83 kg/m².     Input and Output Summary (last 24 hours):     Intake/Output Summary (Last 24 hours) at 1/12/2025 0716  Last data filed at 1/11/2025 1130  Gross per 24 hour   Intake 220 ml   Output --   Net 220 ml       Physical Exam  Constitutional:       General: She is not in acute distress.     Appearance: She is not ill-appearing, toxic-appearing or diaphoretic.   Cardiovascular:      Rate and Rhythm: Normal rate.      Pulses: Normal pulses.   Pulmonary:      Effort: Pulmonary effort is normal. No respiratory distress.      Breath sounds: No wheezing.   Abdominal:      General: Bowel sounds are normal. There is no distension.      Palpations: Abdomen is soft.      Tenderness: There is no abdominal tenderness.   Neurological:      Mental Status: She is alert. Mental status is at baseline.   Psychiatric:         Mood and Affect: Mood normal.         Behavior: Behavior normal.           Lines/Drains:        Telemetry:  Telemetry Orders (From admission, onward)               24 Hour Telemetry Monitoring  Continuous x 24 Hours (Telem)        Expiring   Question:  Reason for 24 Hour Telemetry  Answer:  Arrhythmias requiring acute medical intervention / PPM or ICD malfunction                     Telemetry Reviewed: Normal Sinus Rhythm  Indication for Continued Telemetry Use: No indication for continued use. Will discontinue.                Lab Results: I have reviewed the following results:   Results from last 7 days   Lab Units  01/12/25  0505 01/11/25  0602 01/10/25  1123   WBC Thousand/uL 3.59*   < >  --    HEMOGLOBIN g/dL 9.3*   < >  --    HEMATOCRIT % 29.2*   < >  --    PLATELETS Thousands/uL 203   < >  --    BANDS PCT %  --   --  2   SEGS PCT % 34*  --   --    LYMPHO PCT % 41  --  62   MONO PCT % 16*  --  5   EOS PCT % 6  --  17*    < > = values in this interval not displayed.     Results from last 7 days   Lab Units 01/09/25  0447 01/08/25  0443 01/07/25  1357   SODIUM mmol/L 139   < > 140   POTASSIUM mmol/L 3.7   < > 4.4   CHLORIDE mmol/L 108   < > 107   CO2 mmol/L 27   < > 26   BUN mg/dL 19   < > 26*   CREATININE mg/dL 0.83   < > 1.14   ANION GAP mmol/L 4   < > 7   CALCIUM mg/dL 7.7*   < > 8.7   ALBUMIN g/dL  --   --  3.7   TOTAL BILIRUBIN mg/dL  --   --  0.89   ALK PHOS U/L  --   --  68   ALT U/L  --   --  14   AST U/L  --   --  12*   GLUCOSE RANDOM mg/dL 101   < > 114    < > = values in this interval not displayed.     Results from last 7 days   Lab Units 01/07/25  1357   INR  1.13             Results from last 7 days   Lab Units 01/07/25  1357   LACTIC ACID mmol/L 1.1   PROCALCITONIN ng/ml <0.05       Recent Cultures (last 7 days):   Results from last 7 days   Lab Units 01/07/25  1403 01/07/25  1357   BLOOD CULTURE  No Growth After 4 Days. No Growth After 4 Days.             Last 24 Hours Medication List:     Current Facility-Administered Medications:     acetaminophen (TYLENOL) tablet 650 mg, Q6H PRN    albuterol (PROVENTIL HFA,VENTOLIN HFA) inhaler 2 puff, Q6H PRN    diphenhydramine, lidocaine, Al/Mg hydroxide, simethicone (Magic Mouthwash) oral solution 10 mL, Q4H PRN    fluticasone (FLONASE) 50 mcg/act nasal spray 1 spray, Daily    folic acid (FOLVITE) tablet 1 mg, Daily    traMADol (ULTRAM) tablet 50 mg, Q6H PRN    Administrative Statements   Today, Patient Was Seen By: Garret Singh MD  I have spent a total time of 35 minutes in caring for this patient on the day of the visit/encounter including Diagnostic results,  Patient and family education, Impressions, Counseling / Coordination of care, Documenting in the medical record, and Reviewing / ordering tests, medicine, procedures  .    **Please Note: This note may have been constructed using a voice recognition system.**

## 2025-01-12 NOTE — ASSESSMENT & PLAN NOTE
84-year-old female patient from [facility] here with multiple problems.  Elevated troponin likely secondary to A-fib RVR.  Troponins are elevated however flat.  Cardiology recommended noncardiac in nature most likely because of underlying infection.  Currently recommend outpatient follow-up and ambulatory event monitor.  Cardiology sign off

## 2025-01-13 LAB
ALBUMIN SERPL ELPH-MCNC: 2.97 G/DL (ref 3.2–5.1)
ALBUMIN SERPL ELPH-MCNC: 54 % (ref 48–70)
ALPHA1 GLOB SERPL ELPH-MCNC: 0.36 G/DL (ref 0.15–0.47)
ALPHA1 GLOB SERPL ELPH-MCNC: 6.5 % (ref 1.8–7)
ALPHA2 GLOB SERPL ELPH-MCNC: 0.73 G/DL (ref 0.42–1.04)
ALPHA2 GLOB SERPL ELPH-MCNC: 13.3 % (ref 5.9–14.9)
ANION GAP SERPL CALCULATED.3IONS-SCNC: 7 MMOL/L (ref 4–13)
BETA GLOB ABNORMAL SERPL ELPH-MCNC: 0.32 G/DL (ref 0.31–0.57)
BETA1 GLOB SERPL ELPH-MCNC: 5.8 % (ref 4.7–7.7)
BETA2 GLOB SERPL ELPH-MCNC: 6.7 % (ref 3.1–7.9)
BETA2+GAMMA GLOB SERPL ELPH-MCNC: 0.37 G/DL (ref 0.2–0.58)
BUN SERPL-MCNC: 10 MG/DL (ref 5–25)
CALCIUM SERPL-MCNC: 8.1 MG/DL (ref 8.4–10.2)
CHLORIDE SERPL-SCNC: 106 MMOL/L (ref 96–108)
CMV IGG SERPL QL IA: POSITIVE
CMV IGM SERPL QL IA: NEGATIVE
CO2 SERPL-SCNC: 24 MMOL/L (ref 21–32)
CREAT SERPL-MCNC: 0.76 MG/DL (ref 0.6–1.3)
ERYTHROCYTE [DISTWIDTH] IN BLOOD BY AUTOMATED COUNT: 14.9 % (ref 11.6–15.1)
GAMMA GLOB ABNORMAL SERPL ELPH-MCNC: 0.75 G/DL (ref 0.4–1.66)
GAMMA GLOB SERPL ELPH-MCNC: 13.7 % (ref 6.9–22.3)
GFR SERPL CREATININE-BSD FRML MDRD: 72 ML/MIN/1.73SQ M
GLUCOSE SERPL-MCNC: 98 MG/DL (ref 65–140)
HCT VFR BLD AUTO: 27.7 % (ref 34.8–46.1)
HGB BLD-MCNC: 9.1 G/DL (ref 11.5–15.4)
HSV1 DNA SPEC QL NAA+PROBE: NEGATIVE
HSV2 DNA SPEC QL NAA+PROBE: NEGATIVE
IGG/ALB SER: 1.17 {RATIO} (ref 1.1–1.8)
INTERPRETATION UR IFE-IMP: NORMAL
MCH RBC QN AUTO: 29.3 PG (ref 26.8–34.3)
MCHC RBC AUTO-ENTMCNC: 32.9 G/DL (ref 31.4–37.4)
MCV RBC AUTO: 89 FL (ref 82–98)
METHYLMALONATE SERPL-SCNC: 94 NMOL/L (ref 0–378)
PLATELET # BLD AUTO: 367 THOUSANDS/UL (ref 149–390)
PMV BLD AUTO: 10.1 FL (ref 8.9–12.7)
POTASSIUM SERPL-SCNC: 4.2 MMOL/L (ref 3.5–5.3)
PROCALCITONIN SERPL-MCNC: 1.21 NG/ML
PROT PATTERN SERPL ELPH-IMP: ABNORMAL
PROT SERPL-MCNC: 5.5 G/DL (ref 6.4–8.2)
RBC # BLD AUTO: 3.11 MILLION/UL (ref 3.81–5.12)
SCAN RESULT: NORMAL
SCAN RESULT: NORMAL
SODIUM SERPL-SCNC: 137 MMOL/L (ref 135–147)
WBC # BLD AUTO: 4.34 THOUSAND/UL (ref 4.31–10.16)

## 2025-01-13 PROCEDURE — 85060 BLOOD SMEAR INTERPRETATION: CPT | Performed by: STUDENT IN AN ORGANIZED HEALTH CARE EDUCATION/TRAINING PROGRAM

## 2025-01-13 PROCEDURE — 85027 COMPLETE CBC AUTOMATED: CPT | Performed by: STUDENT IN AN ORGANIZED HEALTH CARE EDUCATION/TRAINING PROGRAM

## 2025-01-13 PROCEDURE — 99232 SBSQ HOSP IP/OBS MODERATE 35: CPT | Performed by: INTERNAL MEDICINE

## 2025-01-13 PROCEDURE — 84145 PROCALCITONIN (PCT): CPT | Performed by: STUDENT IN AN ORGANIZED HEALTH CARE EDUCATION/TRAINING PROGRAM

## 2025-01-13 PROCEDURE — 84165 PROTEIN E-PHORESIS SERUM: CPT | Performed by: STUDENT IN AN ORGANIZED HEALTH CARE EDUCATION/TRAINING PROGRAM

## 2025-01-13 PROCEDURE — 86334 IMMUNOFIX E-PHORESIS SERUM: CPT | Performed by: STUDENT IN AN ORGANIZED HEALTH CARE EDUCATION/TRAINING PROGRAM

## 2025-01-13 PROCEDURE — 87207 SMEAR SPECIAL STAIN: CPT | Performed by: STUDENT IN AN ORGANIZED HEALTH CARE EDUCATION/TRAINING PROGRAM

## 2025-01-13 PROCEDURE — G0545 PR INHERENT VISIT TO INPT: HCPCS | Performed by: INTERNAL MEDICINE

## 2025-01-13 PROCEDURE — 99232 SBSQ HOSP IP/OBS MODERATE 35: CPT | Performed by: STUDENT IN AN ORGANIZED HEALTH CARE EDUCATION/TRAINING PROGRAM

## 2025-01-13 PROCEDURE — 80048 BASIC METABOLIC PNL TOTAL CA: CPT | Performed by: STUDENT IN AN ORGANIZED HEALTH CARE EDUCATION/TRAINING PROGRAM

## 2025-01-13 RX ORDER — ENOXAPARIN SODIUM 100 MG/ML
40 INJECTION SUBCUTANEOUS
Status: DISCONTINUED | OUTPATIENT
Start: 2025-01-13 | End: 2025-01-16 | Stop reason: HOSPADM

## 2025-01-13 RX ADMIN — FOLIC ACID 1 MG: 1 TABLET ORAL at 08:41

## 2025-01-13 RX ADMIN — LIDOCAINE HYDROCHLORIDE 15 ML: 20 SOLUTION ORAL at 15:08

## 2025-01-13 RX ADMIN — ENOXAPARIN SODIUM 40 MG: 40 INJECTION SUBCUTANEOUS at 15:09

## 2025-01-13 RX ADMIN — LIDOCAINE HYDROCHLORIDE 15 ML: 20 SOLUTION ORAL at 08:41

## 2025-01-13 RX ADMIN — FLUTICASONE PROPIONATE 1 SPRAY: 50 SPRAY, METERED NASAL at 08:41

## 2025-01-13 RX ADMIN — TRAMADOL HYDROCHLORIDE 50 MG: 50 TABLET, COATED ORAL at 08:41

## 2025-01-13 NOTE — ASSESSMENT & PLAN NOTE
Patient developed fever up to 101 early in hospitalization.  In the setting of severe bleeding mouth oral ulcerations and pancytopenia, as below.  Of note, patient is from [facility]. Blood cultures are negative.  UA was negative.  CT chest showed right lower lobe bronchial inflammation with no consolidation.  Negative procalcitonin.  No acute findings on CT A/P.  Normal LFTs.  Consideration for viral process, as below.  Fortunately, patient remains hemodynamically stable and nontoxic in appearance. No clinical evidence of encephalitis/meningitis.  Fortunately, fevers appear to have improved.  ANC continues to improve, now up to 1220.   Continue to observe closely off antibiotic.   Follow temperatures and hemodynamics.   Check CBC/ANC in AM   Remainder of workup as below.

## 2025-01-13 NOTE — ASSESSMENT & PLAN NOTE
Presented with significant pancytopenia with platelet count as low as 26, ANC of 660.  Consideration for acute viral process, potentially acquired in [facility]. No transaminitis. Negative HIV, hepatitis panel.  Consideration for acute hematologic process including possible malignancy.  Status post bone marrow biopsy 1/9 with results pending.  Parasite smear is negative.  Negative CMV, parvovirus.  EBV serologies consistent with prior infection.  Fortunately, leukopenia/thrombocytopenia is now much improved.   Follow-up dengue serologies, still pending.   Follow-up bone marrow biopsy results   Hematology input appreciated

## 2025-01-13 NOTE — ASSESSMENT & PLAN NOTE
Cardiology input noted.  EKG without acute ischemic changes.  Suspected secondary to atrial fibrillation with RVR in addition to underlying acute process.

## 2025-01-13 NOTE — ASSESSMENT & PLAN NOTE
Lab Results   Component Value Date    EGFR 72 01/13/2025    EGFR 64 01/09/2025    EGFR 56 01/08/2025    CREATININE 0.76 01/13/2025    CREATININE 0.83 01/09/2025    CREATININE 0.93 01/08/2025

## 2025-01-13 NOTE — ASSESSMENT & PLAN NOTE
Presented with painful mouth ulcerations in the setting of pancytopenia.  Consideration for viral process, as below. Consideration for autoimmune process. Consider drug reaction? No rashes on the skin. Patient was recently started on NSAID and prednisone in [facility] prior to traveling here.  She does have eosinophilia.  Negative HSV PCR.   Workup as below.

## 2025-01-13 NOTE — ASSESSMENT & PLAN NOTE
84-year-old female patient from [facility] here with multiple problems.  Elevated troponin likely secondary to A-fib RVR.  Troponins are elevated however flat.  Cardiology recommended noncardiac in nature most likely because of underlying infection.  Currently recommend outpatient follow-up and ambulatory event monitor.  Cardiology sign off

## 2025-01-13 NOTE — PROGRESS NOTES
Progress Note - Infectious Disease   Name: Bart Jasso 84 y.o. female I MRN: [id number]  Unit/Bed#: 2 E 272-01 I Date of Admission: 1/7/2025   Date of Service: 1/13/2025 I Hospital Day: 5    Assessment & Plan  Fever  Patient developed fever up to 101 early in hospitalization.  In the setting of severe bleeding mouth oral ulcerations and pancytopenia, as below.  Of note, patient is from [facility]. Blood cultures are negative.  UA was negative.  CT chest showed right lower lobe bronchial inflammation with no consolidation.  Negative procalcitonin.  No acute findings on CT A/P.  Normal LFTs.  Consideration for viral process, as below.  Fortunately, patient remains hemodynamically stable and nontoxic in appearance. No clinical evidence of encephalitis/meningitis.  Fortunately, fevers appear to have improved.  ANC continues to improve, now up to 1220.   Continue to observe closely off antibiotic.   Follow temperatures and hemodynamics.   Check CBC/ANC in AM   Remainder of workup as below.  Mouth pain  Presented with painful mouth ulcerations in the setting of pancytopenia.  Consideration for viral process, as below. Consideration for autoimmune process. Consider drug reaction? No rashes on the skin. Patient was recently started on NSAID and prednisone in [facility] prior to traveling here.  She does have eosinophilia.  Negative HSV PCR.   Workup as below.  Pancytopenia (HCC)  Presented with significant pancytopenia with platelet count as low as 26, ANC of 660.  Consideration for acute viral process, [facility]. No transaminitis. Negative HIV, hepatitis panel.  Consideration for acute hematologic process including possible malignancy.  Status post bone marrow biopsy 1/9 with results pending.  Parasite smear is negative.  Negative CMV, parvovirus.  EBV serologies consistent with prior infection.  Fortunately, leukopenia/thrombocytopenia is now much improved.   Follow-up dengue serologies, still  pending.   Follow-up bone marrow biopsy results   Hematology input appreciated  CKD (chronic kidney disease) stage 3, GFR 30-59 ml/min ([facility])  Lab Results   Component Value Date    EGFR 72 01/13/2025    EGFR 64 01/09/2025    EGFR 56 01/08/2025    CREATININE 0.76 01/13/2025    CREATININE 0.83 01/09/2025    CREATININE 0.93 01/08/2025     Elevated troponin  Cardiology input noted.  EKG without acute ischemic changes.  Suspected secondary to atrial fibrillation with RVR in addition to underlying acute process.        Antibiotics:  None    I discussed above plan with primary service who agrees with continued observation off antibiotics.      Subjective   Overall feeling better.  No further high fevers.  No further active bleeding.  No reported acute overnight events.    Objective :  Temp:  [97.2 °F (36.2 °C)-98.6 °F (37 °C)] 98.6 °F (37 °C)  HR:  [71-76] 76  BP: ()/(36-43) 109/43  Resp:  [18-20] 18  SpO2:  [92 %-94 %] 94 %  O2 Device: None (Room air)    General:  No acute distress, fatigued, nontoxic  HEENT atraumatic normocephalic  Neck trachea midline  Psychiatric:  Awake and alert  Pulmonary:  Normal respiratory excursion without accessory muscle use  Abdomen: No rigidity or guarding  Extremities: Stable edema  Skin:  No diffuse rashes  Neuro moves all extremities spontaneously      Lab Results: I have reviewed the following results:  Results from last 7 days   Lab Units 01/13/25  0445 01/12/25  0505 01/11/25  0602   WBC Thousand/uL 4.34 3.59* 2.20*   HEMOGLOBIN g/dL 9.1* 9.3* 10.0*   PLATELETS Thousands/uL 367 203 114*     Results from last 7 days   Lab Units 01/13/25  0445 01/09/25  0447 01/08/25  0443 01/07/25  1357   SODIUM mmol/L 137 139 140 140   POTASSIUM mmol/L 4.2 3.7 4.1 4.4   CHLORIDE mmol/L 106 108 109* 107   CO2 mmol/L 24 27 27 26   BUN mg/dL 10 19 27* 26*   CREATININE mg/dL 0.76 0.83 0.93 1.14   EGFR ml/min/1.73sq m 72 64 56 44   CALCIUM mg/dL 8.1* 7.7* 8.1* 8.7   AST U/L  --   --   --  12*   ALT  U/L  --   --   --  14   ALK PHOS U/L  --   --   --  68   ALBUMIN g/dL  --   --   --  3.7     Results from last 7 days   Lab Units 01/07/25  1403 01/07/25  1357   BLOOD CULTURE  No Growth After 5 Days. No Growth After 5 Days.     Results from last 7 days   Lab Units 01/13/25  0445 01/07/25  1357   PROCALCITONIN ng/ml 1.21* <0.05         Results from last 7 days   Lab Units 01/08/25  1338   FERRITIN ng/mL 278

## 2025-01-13 NOTE — CASE MANAGEMENT
Case Management Progress Note    Patient name Bart Jasso  Location [address] [address]/2 E 272-01 MRN [id number]  : 1940 Date 2025       LOS (days): 5  Geometric Mean LOS (GMLOS) (days): 3.4  Days to GMLOS:-1.8        OBJECTIVE:        Current admission status: Inpatient  Preferred Pharmacy:   [facility]/pharmacy #[id number] - [facility] [place] NOLAN OJEDA - [address] [place] [address] [place] [address]  Phone: [phone] Fax: [phone]    Primary Care Provider: MAKAYLA Chapman    Primary Insurance: [employer]OL[employer] SAMSON PENDING  Secondary Insurance:     PROGRESS NOTE:      CM reviewed chart continues to monitor for d/c planning.

## 2025-01-13 NOTE — PROGRESS NOTES
"Progress Note - Hospitalist   Name: Bart Jasso 84 y.o. female I MRN: [id number]  Unit/Bed#: 2 E 272-01 I Date of Admission: 1/7/2025   Date of Service: 1/13/2025 I Hospital Day: 5    Assessment & Plan  Pancytopenia (HCC)  84[facility].  Elevated troponin likely secondary to A-fib RVR.  Noted pancytopenia, mouth ulcer, odynophagia, chest pain.  Difficult to obtain history since patient primarily speaks native language \"[place] \", patient's daughter speak patient language however daughter does not speak English however grandson is helping contribute to patient's daughter who is helping interpret with the patient.    Had received PLT transfusion on 1/8/2025.  Folate deficiency noted.  COVID/FLU/RSV  negative  HSV PCR in precess  CMV culture negative.  Chronic hepatitis negative.  HIV negative.  EBV IgG positive.  [place] serology in process    Suspected viral etiology.  Fortunately patient is doing well overall off of antibiotics.  Leukemia/lymphoma currently pending,  S/p bone marrow biopsy on 1/9/2025 pathology report pending.  Currently WBC improving to 4.34 hemoglobin 9.1, platelet 367 ANC 1.22.  Pancytopenia improving day by day.  ID, heme-onc on board.    Elevated troponin  84[facility].  Elevated troponin likely secondary to A-fib RVR.  Troponins are elevated however flat.  Cardiology recommended noncardiac in nature most likely because of underlying infection.  Currently recommend outpatient follow-up and ambulatory event monitor.  Cardiology sign off    Cardiac arrhythmia  Reportedly had A-fib with RVR and outpatient PCP office earlier today  Currently normal sinus rhythm and rate controlled  Episode likely due to underlying infection and since patient is currently maintained normal sinus rhythm currently plan is not to anticoagulate.  Outpatient event monitor and outpatient follow-up with " cardiology.  Mouth pain  Reports of mouth pain for the past week or 2 with poor oral intake  Patient was given NSAIDs, steroids and [place] prior to traveling here.  She does have mouth sores and ulcers.  Currently pain appears to be improving and p.o. intake improving with viscous lidocaine.  Family morning of poor p.o. intake due to mouth pain from ulcers.  Started on viscous lidocaine as needed.  ID following.  CKD (chronic kidney disease) stage 3, GFR 30-59 ml/min ([facility])  Lab Results   Component Value Date    EGFR 72 01/13/2025    EGFR 64 01/09/2025    EGFR 56 01/08/2025    CREATININE 0.76 01/13/2025    CREATININE 0.83 01/09/2025    CREATININE 0.93 01/08/2025   Currently creatinine stable.  Paroxysmal atrial fibrillation ([facility])  Reportedly had episode of A-fib with RVR with outpatient PCP visit however currently EKG noted with normal sinus rhythm.  Further care per cardiology recommendation.  Fever  Patient developed fever and hospitalization.  COVID-19 suspicious RSV negative.  UA negative for UTI.  2 sets of blood culture without growth in 5 days.  CT chest showed right lower lobe bronchial inflammation and no consolidation.  Procalcitonin negative.  Suspected viral etiology.  Wbc improving  Patient clinically appears comfortable and hemodynamically stable off antibiotics.  Repeat CBC ordered for tomorrow.  Aortic regurgitation  Echocardiogram with EF of 52%, mild to moderate AR   Recommend outpatient follow-up with cardiology.    VTE Pharmacologic Prophylaxis: VTE Score: 6 Moderate Risk (Score 3-4) - Pharmacological DVT Prophylaxis Ordered: enoxaparin (Lovenox).  Started on Lovenox.    Mobility:   Basic Mobility Inpatient Raw Score: 16  -[facility] Goal: 5: Stand one or more mins  -HL Achieved: 2: Bed activities/Dependent transfer      Patient Centered Rounds: I performed bedside rounds with nursing staff today.   Discussions with Specialists or Other Care Team Provider: ID    Education and Discussions with  Family / Patient:  Spoke with multiple family members at bedside..     Current Length of Stay: 5 day(s)  Current Patient Status: Inpatient   Certification Statement: The patient will continue to require additional inpatient hospital stay due to monitoring of antibiotics, infectious workup currently pending, CBC monitoring  Discharge Plan: Anticipate discharge in 24-48 hrs to home with home services.    Code Status: Level 1 - Full Code    Subjective   Seen during a.m. rounds.  Patient appears comfortable not in distress.  Family reports some improvement with mouth pain and p.o. intake with viscous lidocaine.  Overall she is doing well and no further bleeding from her mouth noted.    Objective :  Temp:  [97.2 °F (36.2 °C)-98.6 °F (37 °C)] 98.6 °F (37 °C)  HR:  [71-76] 76  BP: ()/(36-43) 109/43  Resp:  [18-20] 18  SpO2:  [92 %-94 %] 94 %  O2 Device: None (Room air)    Body mass index is 22.83 kg/m².     Input and Output Summary (last 24 hours):     Intake/Output Summary (Last 24 hours) at 1/13/2025 0824  Last data filed at 1/13/2025 0721  Gross per 24 hour   Intake 480 ml   Output 150 ml   Net 330 ml       Physical Exam  Constitutional:       General: She is not in acute distress.     Appearance: She is not ill-appearing, toxic-appearing or diaphoretic.      Comments: Elderly, deconditioned female patient, chronically ill and acutely nontoxic appearing.   Cardiovascular:      Rate and Rhythm: Normal rate.      Pulses: Normal pulses.   Pulmonary:      Effort: Pulmonary effort is normal. No respiratory distress.      Breath sounds: No wheezing.   Abdominal:      General: Bowel sounds are normal. There is no distension.      Palpations: Abdomen is soft.      Tenderness: There is no abdominal tenderness.   Neurological:      Mental Status: She is alert. Mental status is at baseline.   Psychiatric:         Mood and Affect: Mood normal.         Behavior: Behavior normal.           Lines/Drains:              Lab  Results: I have reviewed the following results:   Results from last 7 days   Lab Units 01/13/25  0445 01/12/25  0505 01/11/25  0602 01/10/25  1123   WBC Thousand/uL 4.34 3.59*   < >  --    HEMOGLOBIN g/dL 9.1* 9.3*   < >  --    HEMATOCRIT % 27.7* 29.2*   < >  --    PLATELETS Thousands/uL 367 203   < >  --    BANDS PCT %  --   --   --  2   SEGS PCT %  --  34*  --   --    LYMPHO PCT %  --  41  --  62   MONO PCT %  --  16*  --  5   EOS PCT %  --  6  --  17*    < > = values in this interval not displayed.     Results from last 7 days   Lab Units 01/13/25 0445 01/08/25  0443 01/07/25  1357   SODIUM mmol/L 137   < > 140   POTASSIUM mmol/L 4.2   < > 4.4   CHLORIDE mmol/L 106   < > 107   CO2 mmol/L 24   < > 26   BUN mg/dL 10   < > 26*   CREATININE mg/dL 0.76   < > 1.14   ANION GAP mmol/L 7   < > 7   CALCIUM mg/dL 8.1*   < > 8.7   ALBUMIN g/dL  --   --  3.7   TOTAL BILIRUBIN mg/dL  --   --  0.89   ALK PHOS U/L  --   --  68   ALT U/L  --   --  14   AST U/L  --   --  12*   GLUCOSE RANDOM mg/dL 98   < > 114    < > = values in this interval not displayed.     Results from last 7 days   Lab Units 01/07/25  1357   INR  1.13             Results from last 7 days   Lab Units 01/13/25  0445 01/07/25  1357   LACTIC ACID mmol/L  --  1.1   PROCALCITONIN ng/ml 1.21* <0.05       Recent Cultures (last 7 days):   Results from last 7 days   Lab Units 01/07/25  1403 01/07/25  1357   BLOOD CULTURE  No Growth After 5 Days. No Growth After 5 Days.         Last 24 Hours Medication List:     Current Facility-Administered Medications:     acetaminophen (TYLENOL) tablet 650 mg, Q6H PRN    albuterol (PROVENTIL HFA,VENTOLIN HFA) inhaler 2 puff, Q6H PRN    fluticasone (FLONASE) 50 mcg/act nasal spray 1 spray, Daily    folic acid (FOLVITE) tablet 1 mg, Daily    Lidocaine Viscous HCl (XYLOCAINE) 2 % mucosal solution 15 mL, 4x Daily PRN    traMADol (ULTRAM) tablet 50 mg, Q6H PRN    Administrative Statements   Today, Patient Was Seen By: Garret Singh,  MD GIRALDO have spent a total time of 35 minutes in caring for this patient on the day of the visit/encounter including Diagnostic results, Patient and family education, Impressions, Counseling / Coordination of care, Documenting in the medical record, and Reviewing / ordering tests, medicine, procedures  .    **Please Note: This note may have been constructed using a voice recognition system.**

## 2025-01-13 NOTE — ASSESSMENT & PLAN NOTE
Reports of mouth pain for the past week or 2 with poor oral intake  Patient was given NSAIDs, steroids and [place] prior to traveling here.  She does have mouth sores and ulcers.  Currently pain appears to be improving and p.o. intake improving with viscous lidocaine.  Family morning of poor p.o. intake due to mouth pain from ulcers.  Started on viscous lidocaine as needed.  ID following.

## 2025-01-13 NOTE — ASSESSMENT & PLAN NOTE
Lab Results   Component Value Date    EGFR 72 01/13/2025    EGFR 64 01/09/2025    EGFR 56 01/08/2025    CREATININE 0.76 01/13/2025    CREATININE 0.83 01/09/2025    CREATININE 0.93 01/08/2025   Currently creatinine stable.

## 2025-01-13 NOTE — ASSESSMENT & PLAN NOTE
Patient developed fever and hospitalization.  COVID-19 suspicious RSV negative.  UA negative for UTI.  2 sets of blood culture without growth in 5 days.  CT chest showed right lower lobe bronchial inflammation and no consolidation.  Procalcitonin negative.  Suspected viral etiology.  Wbc improving  Patient clinically appears comfortable and hemodynamically stable off antibiotics.  Repeat CBC ordered for tomorrow.

## 2025-01-13 NOTE — PLAN OF CARE
Problem: PAIN - ADULT  Goal: Verbalizes/displays adequate comfort level or baseline comfort level  Description: Interventions:  - Encourage patient to monitor pain and request assistance  - Assess pain using appropriate pain scale  - Administer analgesics based on type and severity of pain and evaluate response  - Implement non-pharmacological measures as appropriate and evaluate response  - Consider cultural and social influences on pain and pain management  - Notify physician/advanced practitioner if interventions unsuccessful or patient reports new pain  Outcome: Progressing     Problem: INFECTION - ADULT  Goal: Absence or prevention of progression during hospitalization  Description: INTERVENTIONS:  - Assess and monitor for signs and symptoms of infection  - Monitor lab/diagnostic results  - Monitor all insertion sites, i.e. indwelling lines, tubes, and drains  - Monitor endotracheal if appropriate and nasal secretions for changes in amount and color  - [place] appropriate cooling/warming therapies per order  - Administer medications as ordered  - Instruct and encourage patient and family to use good hand hygiene technique  - Identify and instruct in appropriate isolation precautions for identified infection/condition  Outcome: Progressing     Problem: SAFETY ADULT  Goal: Patient will remain free of falls  Description: INTERVENTIONS:  - Educate patient/family on patient safety including physical limitations  - Instruct patient to call for assistance with activity   - Consult OT/PT to assist with strengthening/mobility   - Keep Call bell within reach  - Keep bed low and locked with side rails adjusted as appropriate  - Keep care items and personal belongings within reach  - Initiate and maintain comfort rounds  - Make Fall Risk Sign visible to staff  - Offer Toileting every 2 Hours, in advance of need  - Initiate/Maintain bed alarm  - Obtain necessary fall risk management equipment  - Apply yellow socks and  The patient is a 89y Female complaining of fall. bracelet for high fall risk patients  - Consider moving patient to room near nurses station  Outcome: Progressing  Goal: Maintain or return to baseline ADL function  Description: INTERVENTIONS:  -  Assess patient's ability to carry out ADLs; assess patient's baseline for ADL function and identify physical deficits which impact ability to perform ADLs (bathing, care of mouth/teeth, toileting, grooming, dressing, etc.)  - Assess/evaluate cause of self-care deficits   - Assess range of motion  - Assess patient's mobility; develop plan if impaired  - Assess patient's need for assistive devices and provide as appropriate  - Encourage maximum independence but intervene and supervise when necessary  - Involve family in performance of ADLs  - Assess for home care needs following discharge   - Consider OT consult to assist with ADL evaluation and planning for discharge  - Provide patient education as appropriate  Outcome: Progressing  Goal: Maintains/Returns to pre admission functional level  Description: INTERVENTIONS:  - Perform AM-PAC 6 Click Basic Mobility/ Daily Activity assessment daily.  - Set and communicate daily mobility goal to care team and patient/family/caregiver.   - Collaborate with rehabilitation services on mobility goals if consulted  - Perform Range of Motion 3 times a day.  - Reposition patient every 3 hours.  - Dangle patient 3 times a day  - Stand patient 3 times a day  - Ambulate patient 3 times a day  - Out of bed to chair 3 times a day   - Out of bed for meals 3 times a day  - Out of bed for toileting  - Record patient progress and toleration of activity level   Outcome: Progressing     Problem: DISCHARGE PLANNING  Goal: Discharge to home or other facility with appropriate resources  Description: INTERVENTIONS:  - Identify barriers to discharge w/patient and caregiver  - Arrange for needed discharge resources and transportation as appropriate  - Identify discharge learning needs (meds, wound care,  etc.)  - Arrange for interpretive services to assist at discharge as needed  - Refer to Case Management Department for coordinating discharge planning if the patient needs post-hospital services based on physician/advanced practitioner order or complex needs related to functional status, cognitive ability, or social support system  Outcome: Progressing     Problem: Knowledge Deficit  Goal: Patient/family/caregiver demonstrates understanding of disease process, treatment plan, medications, and discharge instructions  Description: Complete learning assessment and assess knowledge base.  Interventions:  - Provide teaching at level of understanding  - Provide teaching via preferred learning methods  Outcome: Progressing     Problem: Prexisting or High Potential for Compromised Skin Integrity  Goal: Skin integrity is maintained or improved  Description: INTERVENTIONS:  - Identify patients at risk for skin breakdown  - Assess and monitor skin integrity  - Assess and monitor nutrition and hydration status  - Monitor labs   - Assess for incontinence   - Turn and reposition patient  - Assist with mobility/ambulation  - Relieve pressure over bony prominences  - Avoid friction and shearing  - Provide appropriate hygiene as needed including keeping skin clean and dry  - Evaluate need for skin moisturizer/barrier cream  - Collaborate with interdisciplinary team   - Patient/family teaching  - Consider wound care consult   Outcome: Progressing     Problem: GASTROINTESTINAL - ADULT  Goal: Maintains adequate nutritional intake  Description: INTERVENTIONS:  - Monitor percentage of each meal consumed  - Identify factors contributing to decreased intake, treat as appropriate  - Assist with meals as needed  - Monitor I&O, weight, and lab values if indicated  - Obtain nutrition services referral as needed  Outcome: Progressing  Goal: Oral mucous membranes remain intact  Description: INTERVENTIONS  - Assess oral mucosa and hygiene  practices  - Implement preventative oral hygiene regimen  - Implement oral medicated treatments as ordered  - Initiate Nutrition services referral as needed  Outcome: Progressing

## 2025-01-13 NOTE — ASSESSMENT & PLAN NOTE
"84-year-old female patient from [facility] here with multiple problems.  Elevated troponin likely secondary to A-fib RVR.  Noted pancytopenia, mouth ulcer, odynophagia, chest pain.  Difficult to obtain history since patient primarily speaks native language \"[place] \", patient's daughter speak patient language however daughter does not speak English however grandson is helping contribute to patient's daughter who is helping interpret with the patient.    Had received PLT transfusion on 1/8/2025.  Folate deficiency noted.  COVID/FLU/RSV  negative  HSV PCR in precess  CMV culture negative.  Chronic hepatitis negative.  HIV negative.  EBV IgG positive.  [place] serology in process    Suspected viral etiology.  Fortunately patient is doing well overall off of antibiotics.  Leukemia/lymphoma currently pending,  S/p bone marrow biopsy on 1/9/2025 pathology report pending.  Currently WBC improving to 4.34 hemoglobin 9.1, platelet 367 ANC 1.22.  Pancytopenia improving day by day.  ID, heme-onc on board.    "

## 2025-01-13 NOTE — PLAN OF CARE
Problem: PAIN - ADULT  Goal: Verbalizes/displays adequate comfort level or baseline comfort level  Description: Interventions:  - Encourage patient to monitor pain and request assistance  - Assess pain using appropriate pain scale  - Administer analgesics based on type and severity of pain and evaluate response  - Implement non-pharmacological measures as appropriate and evaluate response  - Consider cultural and social influences on pain and pain management  - Notify physician/advanced practitioner if interventions unsuccessful or patient reports new pain  Outcome: Progressing     Problem: INFECTION - ADULT  Goal: Absence or prevention of progression during hospitalization  Description: INTERVENTIONS:  - Assess and monitor for signs and symptoms of infection  - Monitor lab/diagnostic results  - Monitor all insertion sites, i.e. indwelling lines, tubes, and drains  - Monitor endotracheal if appropriate and nasal secretions for changes in amount and color  - [place] appropriate cooling/warming therapies per order  - Administer medications as ordered  - Instruct and encourage patient and family to use good hand hygiene technique  - Identify and instruct in appropriate isolation precautions for identified infection/condition  Outcome: Progressing     Problem: SAFETY ADULT  Goal: Patient will remain free of falls  Description: INTERVENTIONS:  - Educate patient/family on patient safety including physical limitations  - Instruct patient to call for assistance with activity   - Consult OT/PT to assist with strengthening/mobility   - Keep Call bell within reach  - Keep bed low and locked with side rails adjusted as appropriate  - Keep care items and personal belongings within reach  - Initiate and maintain comfort rounds  - Make Fall Risk Sign visible to staff  - Offer Toileting every 2 Hours, in advance of need  - Initiate/Maintain bed alarm  - Obtain necessary fall risk management equipment: bed alarm  - Apply yellow  socks and bracelet for high fall risk patients  - Consider moving patient to room near nurses station  Outcome: Progressing  Goal: Maintain or return to baseline ADL function  Description: INTERVENTIONS:  -  Assess patient's ability to carry out ADLs; assess patient's baseline for ADL function and identify physical deficits which impact ability to perform ADLs (bathing, care of mouth/teeth, toileting, grooming, dressing, etc.)  - Assess/evaluate cause of self-care deficits   - Assess range of motion  - Assess patient's mobility; develop plan if impaired  - Assess patient's need for assistive devices and provide as appropriate  - Encourage maximum independence but intervene and supervise when necessary  - Involve family in performance of ADLs  - Assess for home care needs following discharge   - Consider OT consult to assist with ADL evaluation and planning for discharge  - Provide patient education as appropriate  Outcome: Progressing     Problem: DISCHARGE PLANNING  Goal: Discharge to home or other facility with appropriate resources  Description: INTERVENTIONS:  - Identify barriers to discharge w/patient and caregiver  - Arrange for needed discharge resources and transportation as appropriate  - Identify discharge learning needs (meds, wound care, etc.)  - Arrange for interpretive services to assist at discharge as needed  - Refer to Case Management Department for coordinating discharge planning if the patient needs post-hospital services based on physician/advanced practitioner order or complex needs related to functional status, cognitive ability, or social support system  Outcome: Progressing     Problem: Knowledge Deficit  Goal: Patient/family/caregiver demonstrates understanding of disease process, treatment plan, medications, and discharge instructions  Description: Complete learning assessment and assess knowledge base.  Interventions:  - Provide teaching at level of understanding  - Provide teaching via  preferred learning methods  Outcome: Progressing     Problem: Prexisting or High Potential for Compromised Skin Integrity  Goal: Skin integrity is maintained or improved  Description: INTERVENTIONS:  - Identify patients at risk for skin breakdown  - Assess and monitor skin integrity  - Assess and monitor nutrition and hydration status  - Monitor labs   - Assess for incontinence   - Turn and reposition patient  - Assist with mobility/ambulation  - Relieve pressure over bony prominences  - Avoid friction and shearing  - Provide appropriate hygiene as needed including keeping skin clean and dry  - Evaluate need for skin moisturizer/barrier cream  - Collaborate with interdisciplinary team   - Patient/family teaching  - Consider wound care consult   Outcome: Progressing     Problem: GASTROINTESTINAL - ADULT  Goal: Maintains adequate nutritional intake  Description: INTERVENTIONS:  - Monitor percentage of each meal consumed  - Identify factors contributing to decreased intake, treat as appropriate  - Assist with meals as needed  - Monitor I&O, weight, and lab values if indicated  - Obtain nutrition services referral as needed  Outcome: Progressing  Goal: Oral mucous membranes remain intact  Description: INTERVENTIONS  - Assess oral mucosa and hygiene practices  - Implement preventative oral hygiene regimen  - Implement oral medicated treatments as ordered  - Initiate Nutrition services referral as needed  Outcome: Progressing

## 2025-01-14 LAB
ANION GAP SERPL CALCULATED.3IONS-SCNC: 5 MMOL/L (ref 4–13)
BUN SERPL-MCNC: 7 MG/DL (ref 5–25)
CALCIUM SERPL-MCNC: 8.4 MG/DL (ref 8.4–10.2)
CHLORIDE SERPL-SCNC: 103 MMOL/L (ref 96–108)
CO2 SERPL-SCNC: 26 MMOL/L (ref 21–32)
CREAT SERPL-MCNC: 0.88 MG/DL (ref 0.6–1.3)
ERYTHROCYTE [DISTWIDTH] IN BLOOD BY AUTOMATED COUNT: 15 % (ref 11.6–15.1)
GFR SERPL CREATININE-BSD FRML MDRD: 60 ML/MIN/1.73SQ M
GLUCOSE SERPL-MCNC: 100 MG/DL (ref 65–140)
HCT VFR BLD AUTO: 29 % (ref 34.8–46.1)
HGB BLD-MCNC: 9.4 G/DL (ref 11.5–15.4)
MCH RBC QN AUTO: 28.9 PG (ref 26.8–34.3)
MCHC RBC AUTO-ENTMCNC: 32.4 G/DL (ref 31.4–37.4)
MCV RBC AUTO: 89 FL (ref 82–98)
PLATELET # BLD AUTO: 560 THOUSANDS/UL (ref 149–390)
PMV BLD AUTO: 9.4 FL (ref 8.9–12.7)
POTASSIUM SERPL-SCNC: 4 MMOL/L (ref 3.5–5.3)
PROCALCITONIN SERPL-MCNC: 0.59 NG/ML
RBC # BLD AUTO: 3.25 MILLION/UL (ref 3.81–5.12)
SODIUM SERPL-SCNC: 134 MMOL/L (ref 135–147)
WBC # BLD AUTO: 4.66 THOUSAND/UL (ref 4.31–10.16)

## 2025-01-14 PROCEDURE — 99232 SBSQ HOSP IP/OBS MODERATE 35: CPT | Performed by: INTERNAL MEDICINE

## 2025-01-14 PROCEDURE — 85027 COMPLETE CBC AUTOMATED: CPT | Performed by: STUDENT IN AN ORGANIZED HEALTH CARE EDUCATION/TRAINING PROGRAM

## 2025-01-14 PROCEDURE — G0545 PR INHERENT VISIT TO INPT: HCPCS | Performed by: INTERNAL MEDICINE

## 2025-01-14 PROCEDURE — 99232 SBSQ HOSP IP/OBS MODERATE 35: CPT

## 2025-01-14 PROCEDURE — 85060 BLOOD SMEAR INTERPRETATION: CPT | Performed by: PATHOLOGY

## 2025-01-14 PROCEDURE — 84145 PROCALCITONIN (PCT): CPT | Performed by: STUDENT IN AN ORGANIZED HEALTH CARE EDUCATION/TRAINING PROGRAM

## 2025-01-14 PROCEDURE — 80048 BASIC METABOLIC PNL TOTAL CA: CPT | Performed by: STUDENT IN AN ORGANIZED HEALTH CARE EDUCATION/TRAINING PROGRAM

## 2025-01-14 RX ADMIN — ACETAMINOPHEN 650 MG: 325 TABLET, FILM COATED ORAL at 08:42

## 2025-01-14 RX ADMIN — FOLIC ACID 1 MG: 1 TABLET ORAL at 08:42

## 2025-01-14 RX ADMIN — ACETAMINOPHEN 650 MG: 325 TABLET, FILM COATED ORAL at 02:54

## 2025-01-14 RX ADMIN — ENOXAPARIN SODIUM 40 MG: 40 INJECTION SUBCUTANEOUS at 08:42

## 2025-01-14 NOTE — PLAN OF CARE
Problem: PAIN - ADULT  Goal: Verbalizes/displays adequate comfort level or baseline comfort level  Description: Interventions:  - Encourage patient to monitor pain and request assistance  - Assess pain using appropriate pain scale  - Administer analgesics based on type and severity of pain and evaluate response  - Implement non-pharmacological measures as appropriate and evaluate response  - Consider cultural and social influences on pain and pain management  - Notify physician/advanced practitioner if interventions unsuccessful or patient reports new pain  Outcome: Progressing     Problem: INFECTION - ADULT  Goal: Absence or prevention of progression during hospitalization  Description: INTERVENTIONS:  - Assess and monitor for signs and symptoms of infection  - Monitor lab/diagnostic results  - Monitor all insertion sites, i.e. indwelling lines, tubes, and drains  - Monitor endotracheal if appropriate and nasal secretions for changes in amount and color  - [place] appropriate cooling/warming therapies per order  - Administer medications as ordered  - Instruct and encourage patient and family to use good hand hygiene technique  - Identify and instruct in appropriate isolation precautions for identified infection/condition  Outcome: Progressing     Problem: SAFETY ADULT  Goal: Patient will remain free of falls  Description: INTERVENTIONS:  - Educate patient/family on patient safety including physical limitations  - Instruct patient to call for assistance with activity   - Consult OT/PT to assist with strengthening/mobility   - Keep Call bell within reach  - Keep bed low and locked with side rails adjusted as appropriate  - Keep care items and personal belongings within reach  - Initiate and maintain comfort rounds  - Make Fall Risk Sign visible to staff  - Offer Toileting every  Hours, in advance of need  - Initiate/Maintain alarm  - Obtain necessary fall risk management equipment:   - Apply yellow socks and  bracelet for high fall risk patients  - Consider moving patient to room near nurses station  Outcome: Progressing  Goal: Maintain or return to baseline ADL function  Description: INTERVENTIONS:  -  Assess patient's ability to carry out ADLs; assess patient's baseline for ADL function and identify physical deficits which impact ability to perform ADLs (bathing, care of mouth/teeth, toileting, grooming, dressing, etc.)  - Assess/evaluate cause of self-care deficits   - Assess range of motion  - Assess patient's mobility; develop plan if impaired  - Assess patient's need for assistive devices and provide as appropriate  - Encourage maximum independence but intervene and supervise when necessary  - Involve family in performance of ADLs  - Assess for home care needs following discharge   - Consider OT consult to assist with ADL evaluation and planning for discharge  - Provide patient education as appropriate  Outcome: Progressing  Goal: Maintains/Returns to pre admission functional level  Description: INTERVENTIONS:  - Perform AM-PAC 6 Click Basic Mobility/ Daily Activity assessment daily.  - Set and communicate daily mobility goal to care team and patient/family/caregiver.   - Collaborate with rehabilitation services on mobility goals if consulted  - Perform Range of Motion  times a day.  - Reposition patient every  hours.  - Dangle patient  times a day  - Stand patient  times a day  - Ambulate patient  times a day  - Out of bed to chair  times a day   - Out of bed for meals  times a day  - Out of bed for toileting  - Record patient progress and toleration of activity level   Outcome: Progressing     Problem: DISCHARGE PLANNING  Goal: Discharge to home or other facility with appropriate resources  Description: INTERVENTIONS:  - Identify barriers to discharge w/patient and caregiver  - Arrange for needed discharge resources and transportation as appropriate  - Identify discharge learning needs (meds, wound care, etc.)  -  Arrange for interpretive services to assist at discharge as needed  - Refer to Case Management Department for coordinating discharge planning if the patient needs post-hospital services based on physician/advanced practitioner order or complex needs related to functional status, cognitive ability, or social support system  Outcome: Progressing     Problem: Knowledge Deficit  Goal: Patient/family/caregiver demonstrates understanding of disease process, treatment plan, medications, and discharge instructions  Description: Complete learning assessment and assess knowledge base.  Interventions:  - Provide teaching at level of understanding  - Provide teaching via preferred learning methods  Outcome: Progressing     Problem: Prexisting or High Potential for Compromised Skin Integrity  Goal: Skin integrity is maintained or improved  Description: INTERVENTIONS:  - Identify patients at risk for skin breakdown  - Assess and monitor skin integrity  - Assess and monitor nutrition and hydration status  - Monitor labs   - Assess for incontinence   - Turn and reposition patient  - Assist with mobility/ambulation  - Relieve pressure over bony prominences  - Avoid friction and shearing  - Provide appropriate hygiene as needed including keeping skin clean and dry  - Evaluate need for skin moisturizer/barrier cream  - Collaborate with interdisciplinary team   - Patient/family teaching  - Consider wound care consult   Outcome: Progressing     Problem: GASTROINTESTINAL - ADULT  Goal: Maintains adequate nutritional intake  Description: INTERVENTIONS:  - Monitor percentage of each meal consumed  - Identify factors contributing to decreased intake, treat as appropriate  - Assist with meals as needed  - Monitor I&O, weight, and lab values if indicated  - Obtain nutrition services referral as needed  Outcome: Progressing  Goal: Oral mucous membranes remain intact  Description: INTERVENTIONS  - Assess oral mucosa and hygiene practices  -  Implement preventative oral hygiene regimen  - Implement oral medicated treatments as ordered  - Initiate Nutrition services referral as needed  Outcome: Progressing

## 2025-01-14 NOTE — PROGRESS NOTES
Progress Note - Infectious Disease   Name: Bart Jasso 84 y.o. female I MRN: [id number]  Unit/Bed#: 2 E 272-01 I Date of Admission: 1/7/2025   Date of Service: 1/14/2025 I Hospital Day: 6    Assessment & Plan  Fever  Patient developed fever up to 101 early in hospitalization.  In the setting of severe bleeding mouth oral ulcerations and pancytopenia, as below.  Of note, patient is from [facility]. Blood cultures are negative.  UA was negative.  CT chest showed right lower lobe bronchial inflammation with no consolidation.  Negative procalcitonin.  No acute findings on CT A/P.  Normal LFTs.  Consideration for viral process, as below.  Fortunately, patient remains hemodynamically stable and nontoxic in appearance. No clinical evidence of encephalitis/meningitis.  Fevers improved during hospitalization however patient had isolated fever overnight which has since improved.  No new localizing complaints.  ANC has improved.   Continue to observe closely off antibiotic given ongoing clinical stability off antibiotics.   Follow temperatures and hemodynamics.   Check CBC/ANC in AM   Remainder of workup as below.  Mouth pain  Presented with painful mouth ulcerations in the setting of pancytopenia.  Consideration for viral process, as below. Consideration for autoimmune process. Consider drug reaction? No rashes on the skin. Patient was recently started on NSAID and prednisone in [facility] prior to traveling here.  She does have eosinophilia.  Negative HSV PCR.   Workup as below.  Pancytopenia (HCC)  Presented with significant pancytopenia with platelet count as low as 26, ANC of 660.  Consideration for acute viral process, potentially acquired in [facility]. No transaminitis. Negative HIV, hepatitis panel.  Consideration for acute hematologic process including possible malignancy.  Status post bone marrow biopsy 1/9 with results pending.  Parasite smear is negative.  Negative CMV, parvovirus.  EBV serologies consistent  with prior infection.  Fortunately, leukopenia/thrombocytopenia is now much improved.   Follow-up dengue serologies, still pending.   Follow-up bone marrow biopsy results   Hematology input appreciated  CKD (chronic kidney disease) stage 3, GFR 30-59 ml/min ([facility])  Lab Results   Component Value Date    EGFR 60 01/14/2025    EGFR 72 01/13/2025    EGFR 64 01/09/2025    CREATININE 0.88 01/14/2025    CREATININE 0.76 01/13/2025    CREATININE 0.83 01/09/2025     Elevated troponin  Cardiology input noted.  EKG without acute ischemic changes.  Suspected secondary to atrial fibrillation with RVR in addition to underlying acute process.        Antibiotics:  None    I discussed above plan with primary service who agrees with continued close observation off antibiotics.    Subjective   Patient had isolated fever of 101 overnight.  Overall feeling better today.  No new localizing complaints.    Objective :  Temp:  [98.3 °F (36.8 °C)-101 °F (38.3 °C)] 98.3 °F (36.8 °C)  HR:  [61-81] 61  BP: ()/(40-60) 117/40  Resp:  [19] 19  SpO2:  [88 %-96 %] 95 %  O2 Device: None (Room air)    General:  No acute distress  HEENT atraumatic normocephalic  Neck trachea midline  Psychiatric:  Awake and alert  Pulmonary:  Normal respiratory excursion without accessory muscle use  Abdomen: No visible distention  Extremities:  No edema  Skin:  No new rash  Neuro moves all extremities spontaneous      Lab Results: I have reviewed the following results:  Results from last 7 days   Lab Units 01/14/25  0504 01/13/25 0445 01/12/25  0505   WBC Thousand/uL 4.66 4.34 3.59*   HEMOGLOBIN g/dL 9.4* 9.1* 9.3*   PLATELETS Thousands/uL 560* 367 203     Results from last 7 days   Lab Units 01/14/25  0504 01/13/25  0445 01/09/25  0447 01/08/25  0443 01/07/25  1357   SODIUM mmol/L 134* 137 139   < > 140   POTASSIUM mmol/L 4.0 4.2 3.7   < > 4.4   CHLORIDE mmol/L 103 106 108   < > 107   CO2 mmol/L 26 24 27   < > 26   BUN mg/dL 7 10 19   < > 26*   CREATININE mg/dL  0.88 0.76 0.83   < > 1.14   EGFR ml/min/1.73sq m 60 72 64   < > 44   CALCIUM mg/dL 8.4 8.1* 7.7*   < > 8.7   AST U/L  --   --   --   --  12*   ALT U/L  --   --   --   --  14   ALK PHOS U/L  --   --   --   --  68   ALBUMIN g/dL  --   --   --   --  3.7    < > = values in this interval not displayed.     Results from last 7 days   Lab Units 01/07/25  1403 01/07/25  1357   BLOOD CULTURE  No Growth After 5 Days. No Growth After 5 Days.     Results from last 7 days   Lab Units 01/14/25  0504 01/13/25  0445 01/07/25  1357   PROCALCITONIN ng/ml 0.59* 1.21* <0.05         Results from last 7 days   Lab Units 01/08/25  1338   FERRITIN ng/mL 278

## 2025-01-14 NOTE — ASSESSMENT & PLAN NOTE
Patient developed fever up to 101 early in hospitalization.  In the setting of severe bleeding mouth oral ulcerations and pancytopenia, as below.  Of note, patient is from [facility]. Blood cultures are negative.  UA was negative.  CT chest showed right lower lobe bronchial inflammation with no consolidation.  Negative procalcitonin.  No acute findings on CT A/P.  Normal LFTs.  Consideration for viral process, as below.  Fortunately, patient remains hemodynamically stable and nontoxic in appearance. No clinical evidence of encephalitis/meningitis.  Fevers improved during hospitalization however patient had isolated fever overnight which has since improved.  No new localizing complaints.  ANC has improved.   Continue to observe closely off antibiotic given ongoing clinical stability off antibiotics.   Follow temperatures and hemodynamics.   Check CBC/ANC in AM   Remainder of workup as below.

## 2025-01-14 NOTE — ASSESSMENT & PLAN NOTE
Lab Results   Component Value Date    EGFR 60 01/14/2025    EGFR 72 01/13/2025    EGFR 64 01/09/2025    CREATININE 0.88 01/14/2025    CREATININE 0.76 01/13/2025    CREATININE 0.83 01/09/2025

## 2025-01-14 NOTE — ASSESSMENT & PLAN NOTE
Lab Results   Component Value Date    EGFR 60 01/14/2025    EGFR 72 01/13/2025    EGFR 64 01/09/2025    CREATININE 0.88 01/14/2025    CREATININE 0.76 01/13/2025    CREATININE 0.83 01/09/2025   Currently creatinine stable.

## 2025-01-14 NOTE — ASSESSMENT & PLAN NOTE
Presented with painful mouth ulcerations in the setting of pancytopenia.  Consideration for viral process, as below. Consideration for autoimmune process. Consider drug reaction? No rashes on the skin. Patient was recently started on NSAID and prednisone in [facility] prior to traveling here.  She does have eosinophilia.  Negative HSV PCR.   Workup as below.

## 2025-01-14 NOTE — ASSESSMENT & PLAN NOTE
Presented with significant pancytopenia with platelet count as low as 26, ANC of 660.  Consideration for acute viral process, potentially acquired in [facility]. No transaminitis. Negative HIV, hepatitis panel.  Consideration for acute hematologic process including possible malignancy.  Status post bone marrow biopsy 1/9 with results pending.  Parasite smear is negative.  Negative CMV, parvovirus.  EBV serologies consistent with prior infection.  Fortunately, leukopenia/thrombocytopenia is now much improved.   Follow-up dengue serologies, still pending.   Follow-up bone marrow biopsy results   Hematology input appreciated

## 2025-01-14 NOTE — ASSESSMENT & PLAN NOTE
"84-year-old female patient from [facility] here with multiple problems.  Elevated troponin likely secondary to A-fib RVR.  Noted pancytopenia, mouth ulcer, odynophagia, chest pain.  Difficult to obtain history since patient primarily speaks native language \"[place] \", patient's daughter speak patient language however daughter does not speak English however grandson is helping contribute to patient's daughter who is helping interpret with the patient.    Had received PLT transfusion on 1/8/2025.  Folate deficiency noted.  COVID/FLU/RSV  negative  HSV PCR in precess  CMV culture negative.  Chronic hepatitis negative.  HIV negative.  EBV IgG positive likely previous infection  [place] serology in process    Suspected viral etiology.  Patient had another episode of fever last night 101.  Still hold off on further intervention.  Leukemia/lymphoma mentioning neoplasm/infection.  Oncology team following. S/p bone marrow biopsy on 1/9/2025 pathology report pending.  ID, heme-onc on board.    "

## 2025-01-14 NOTE — PLAN OF CARE
Problem: PAIN - ADULT  Goal: Verbalizes/displays adequate comfort level or baseline comfort level  Description: Interventions:  - Encourage patient to monitor pain and request assistance  - Assess pain using appropriate pain scale  - Administer analgesics based on type and severity of pain and evaluate response  - Implement non-pharmacological measures as appropriate and evaluate response  - Consider cultural and social influences on pain and pain management  - Notify physician/advanced practitioner if interventions unsuccessful or patient reports new pain  Outcome: Progressing     Problem: INFECTION - ADULT  Goal: Absence or prevention of progression during hospitalization  Description: INTERVENTIONS:  - Assess and monitor for signs and symptoms of infection  - Monitor lab/diagnostic results  - Monitor all insertion sites, i.e. indwelling lines, tubes, and drains  - Monitor endotracheal if appropriate and nasal secretions for changes in amount and color  - [place] appropriate cooling/warming therapies per order  - Administer medications as ordered  - Instruct and encourage patient and family to use good hand hygiene technique  - Identify and instruct in appropriate isolation precautions for identified infection/condition  Outcome: Progressing     Problem: SAFETY ADULT  Goal: Patient will remain free of falls  Description: INTERVENTIONS:  - Educate patient/family on patient safety including physical limitations  - Instruct patient to call for assistance with activity   - Consult OT/PT to assist with strengthening/mobility   - Keep Call bell within reach  - Keep bed low and locked with side rails adjusted as appropriate  - Keep care items and personal belongings within reach  - Initiate and maintain comfort rounds  - Make Fall Risk Sign visible to staff  - Offer Toileting every 2 Hours, in advance of need  - Initiate/Maintain alarm  - Obtain necessary fall risk management equipment  - Apply yellow socks and  bracelet for high fall risk patients  - Consider moving patient to room near nurses station  Outcome: Progressing  Goal: Maintain or return to baseline ADL function  Description: INTERVENTIONS:  -  Assess patient's ability to carry out ADLs; assess patient's baseline for ADL function and identify physical deficits which impact ability to perform ADLs (bathing, care of mouth/teeth, toileting, grooming, dressing, etc.)  - Assess/evaluate cause of self-care deficits   - Assess range of motion  - Assess patient's mobility; develop plan if impaired  - Assess patient's need for assistive devices and provide as appropriate  - Encourage maximum independence but intervene and supervise when necessary  - Involve family in performance of ADLs  - Assess for home care needs following discharge   - Consider OT consult to assist with ADL evaluation and planning for discharge  - Provide patient education as appropriate  Outcome: Progressing  Goal: Maintains/Returns to pre admission functional level  Description: INTERVENTIONS:  - Perform AM-PAC 6 Click Basic Mobility/ Daily Activity assessment daily.  - Set and communicate daily mobility goal to care team and patient/family/caregiver.   - Collaborate with rehabilitation services on mobility goals if consulted  - Perform Range of Motion 3 times a day.  - Reposition patient every 3 hours.  - Dangle patient 3 times a day  - Stand patient 3 times a day  - Ambulate patient 3 times a day  - Out of bed to chair 3 times a day   - Out of bed for meals 3 times a day  - Out of bed for toileting  - Record patient progress and toleration of activity level   Outcome: Progressing     Problem: DISCHARGE PLANNING  Goal: Discharge to home or other facility with appropriate resources  Description: INTERVENTIONS:  - Identify barriers to discharge w/patient and caregiver  - Arrange for needed discharge resources and transportation as appropriate  - Identify discharge learning needs (meds, wound care,  etc.)  - Arrange for interpretive services to assist at discharge as needed  - Refer to Case Management Department for coordinating discharge planning if the patient needs post-hospital services based on physician/advanced practitioner order or complex needs related to functional status, cognitive ability, or social support system  Outcome: Progressing     Problem: Knowledge Deficit  Goal: Patient/family/caregiver demonstrates understanding of disease process, treatment plan, medications, and discharge instructions  Description: Complete learning assessment and assess knowledge base.  Interventions:  - Provide teaching at level of understanding  - Provide teaching via preferred learning methods  Outcome: Progressing     Problem: Prexisting or High Potential for Compromised Skin Integrity  Goal: Skin integrity is maintained or improved  Description: INTERVENTIONS:  - Identify patients at risk for skin breakdown  - Assess and monitor skin integrity  - Assess and monitor nutrition and hydration status  - Monitor labs   - Assess for incontinence   - Turn and reposition patient  - Assist with mobility/ambulation  - Relieve pressure over bony prominences  - Avoid friction and shearing  - Provide appropriate hygiene as needed including keeping skin clean and dry  - Evaluate need for skin moisturizer/barrier cream  - Collaborate with interdisciplinary team   - Patient/family teaching  - Consider wound care consult   Outcome: Progressing     Problem: GASTROINTESTINAL - ADULT  Goal: Maintains adequate nutritional intake  Description: INTERVENTIONS:  - Monitor percentage of each meal consumed  - Identify factors contributing to decreased intake, treat as appropriate  - Assist with meals as needed  - Monitor I&O, weight, and lab values if indicated  - Obtain nutrition services referral as needed  Outcome: Progressing  Goal: Oral mucous membranes remain intact  Description: INTERVENTIONS  - Assess oral mucosa and hygiene  practices  - Implement preventative oral hygiene regimen  - Implement oral medicated treatments as ordered  - Initiate Nutrition services referral as needed  Outcome: Progressing

## 2025-01-14 NOTE — ASSESSMENT & PLAN NOTE
Patient developed fever and hospitalization.  COVID-19 suspicious RSV negative.  UA negative for UTI.  2 sets of blood culture without growth in 5 days.  CT chest showed right lower lobe bronchial inflammation and no consolidation.  Patient had another episode of fever last night 101 Tmax.  ID team following.  Continue to monitor off antibiotics given patient improving.  Repeat CBC ordered for tomorrow.

## 2025-01-15 LAB
ANION GAP SERPL CALCULATED.3IONS-SCNC: 6 MMOL/L (ref 4–13)
ANISOCYTOSIS BLD QL SMEAR: PRESENT
BASOPHILS # BLD MANUAL: 0.06 THOUSAND/UL (ref 0–0.1)
BASOPHILS NFR MAR MANUAL: 1 % (ref 0–1)
BUN SERPL-MCNC: 15 MG/DL (ref 5–25)
CALCIUM SERPL-MCNC: 8.6 MG/DL (ref 8.4–10.2)
CHLORIDE SERPL-SCNC: 103 MMOL/L (ref 96–108)
CO2 SERPL-SCNC: 28 MMOL/L (ref 21–32)
CREAT SERPL-MCNC: 0.83 MG/DL (ref 0.6–1.3)
EOSINOPHIL # BLD MANUAL: 0.13 THOUSAND/UL (ref 0–0.4)
EOSINOPHIL NFR BLD MANUAL: 2 % (ref 0–6)
ERYTHROCYTE [DISTWIDTH] IN BLOOD BY AUTOMATED COUNT: 15.2 % (ref 11.6–15.1)
GFR SERPL CREATININE-BSD FRML MDRD: 64 ML/MIN/1.73SQ M
GIANT PLATELETS BLD QL SMEAR: PRESENT
GLUCOSE SERPL-MCNC: 107 MG/DL (ref 65–140)
HCT VFR BLD AUTO: 31.4 % (ref 34.8–46.1)
HGB BLD-MCNC: 10 G/DL (ref 11.5–15.4)
LG PLATELETS BLD QL SMEAR: PRESENT
LYMPHOCYTES # BLD AUTO: 2.38 THOUSAND/UL (ref 0.6–4.47)
LYMPHOCYTES # BLD AUTO: 37 % (ref 14–44)
Lab: NORMAL
Lab: NORMAL
MAGNESIUM SERPL-MCNC: 2 MG/DL (ref 1.9–2.7)
MCH RBC QN AUTO: 28.1 PG (ref 26.8–34.3)
MCHC RBC AUTO-ENTMCNC: 31.8 G/DL (ref 31.4–37.4)
MCV RBC AUTO: 88 FL (ref 82–98)
METAMYELOCYTE ABSOLUTE CT: 0.06 THOUSAND/UL (ref 0–0.1)
METAMYELOCYTES NFR BLD MANUAL: 1 % (ref 0–1)
MISCELLANEOUS LAB TEST RESULT: NORMAL
MONOCYTES # BLD AUTO: 0.64 THOUSAND/UL (ref 0–1.22)
MONOCYTES NFR BLD: 10 % (ref 4–12)
MYELOCYTE ABSOLUTE CT: 0.64 THOUSAND/UL (ref 0–0.1)
MYELOCYTES NFR BLD MANUAL: 10 % (ref 0–1)
NEUTROPHILS # BLD MANUAL: 2.51 THOUSAND/UL (ref 1.85–7.62)
NEUTS BAND NFR BLD MANUAL: 8 % (ref 0–8)
NEUTS SEG NFR BLD AUTO: 31 % (ref 43–75)
PLATELET # BLD AUTO: 874 THOUSANDS/UL (ref 149–390)
PLATELET BLD QL SMEAR: ABNORMAL
PMV BLD AUTO: 9.1 FL (ref 8.9–12.7)
POTASSIUM SERPL-SCNC: 4.3 MMOL/L (ref 3.5–5.3)
RBC # BLD AUTO: 3.56 MILLION/UL (ref 3.81–5.12)
RBC MORPH BLD: PRESENT
SODIUM SERPL-SCNC: 137 MMOL/L (ref 135–147)
WBC # BLD AUTO: 6.43 THOUSAND/UL (ref 4.31–10.16)

## 2025-01-15 PROCEDURE — 85027 COMPLETE CBC AUTOMATED: CPT

## 2025-01-15 PROCEDURE — 83735 ASSAY OF MAGNESIUM: CPT

## 2025-01-15 PROCEDURE — 85007 BL SMEAR W/DIFF WBC COUNT: CPT

## 2025-01-15 PROCEDURE — 99232 SBSQ HOSP IP/OBS MODERATE 35: CPT

## 2025-01-15 PROCEDURE — 80048 BASIC METABOLIC PNL TOTAL CA: CPT

## 2025-01-15 RX ORDER — LIDOCAINE HYDROCHLORIDE 20 MG/ML
15 SOLUTION OROPHARYNGEAL
Status: DISCONTINUED | OUTPATIENT
Start: 2025-01-15 | End: 2025-01-16 | Stop reason: HOSPADM

## 2025-01-15 RX ORDER — OXYCODONE HYDROCHLORIDE 5 MG/1
5 TABLET ORAL EVERY 6 HOURS PRN
Refills: 0 | Status: DISCONTINUED | OUTPATIENT
Start: 2025-01-15 | End: 2025-01-16 | Stop reason: HOSPADM

## 2025-01-15 RX ORDER — ACETAMINOPHEN 325 MG/1
975 TABLET ORAL EVERY 6 HOURS SCHEDULED
Status: DISCONTINUED | OUTPATIENT
Start: 2025-01-15 | End: 2025-01-16 | Stop reason: HOSPADM

## 2025-01-15 RX ADMIN — ENOXAPARIN SODIUM 40 MG: 40 INJECTION SUBCUTANEOUS at 10:47

## 2025-01-15 RX ADMIN — FOLIC ACID 1 MG: 1 TABLET ORAL at 09:15

## 2025-01-15 RX ADMIN — ACETAMINOPHEN 975 MG: 325 TABLET, FILM COATED ORAL at 10:48

## 2025-01-15 RX ADMIN — LIDOCAINE HYDROCHLORIDE 15 ML: 20 SOLUTION ORAL; TOPICAL at 21:10

## 2025-01-15 RX ADMIN — OXYCODONE HYDROCHLORIDE 5 MG: 5 TABLET ORAL at 21:23

## 2025-01-15 RX ADMIN — Medication 2.5 MG: at 10:47

## 2025-01-15 RX ADMIN — LIDOCAINE HYDROCHLORIDE 15 ML: 20 SOLUTION ORAL; TOPICAL at 17:31

## 2025-01-15 RX ADMIN — LIDOCAINE HYDROCHLORIDE 15 ML: 20 SOLUTION ORAL; TOPICAL at 10:49

## 2025-01-15 RX ADMIN — ACETAMINOPHEN 975 MG: 325 TABLET, FILM COATED ORAL at 17:30

## 2025-01-15 NOTE — ASSESSMENT & PLAN NOTE
Lab Results   Component Value Date    EGFR 64 01/15/2025    EGFR 60 01/14/2025    EGFR 72 01/13/2025    CREATININE 0.83 01/15/2025    CREATININE 0.88 01/14/2025    CREATININE 0.76 01/13/2025   Currently creatinine stable.

## 2025-01-15 NOTE — PROGRESS NOTES
"Progress Note - Hospitalist   Name: Bart Jasso 84 y.o. female I MRN: [id number]  Unit/Bed#: 2 E 272-01 I Date of Admission: 1/7/2025   Date of Service: 1/15/2025 I Hospital Day: 7    Assessment & Plan  Pancytopenia (HCC)  84-year-old female patient from [facility] here with multiple problems.  Elevated troponin likely secondary to A-fib RVR.  Noted pancytopenia, mouth ulcer, odynophagia, chest pain.  Difficult to obtain history since patient primarily speaks native language \"[place] \", patient's daughter speak patient language however daughter does not speak English however grandson is helping contribute to patient's daughter who is helping interpret with the patient.    Had received PLT transfusion on 1/8/2025.  Folate deficiency noted.  COVID/FLU/RSV  negative  HSV PCR in precess  CMV culture negative.  Chronic hepatitis negative.  HIV negative.  EBV IgG positive likely previous infection  Dangue serology negative    Suspicion pancytopenia to be in the setting of viral given improvement in WBC as well as platelets, however not completely ruled out malignancy.  Leukemia/lymphoma mentioning neoplasm/infection.  Oncology team following. S/p bone marrow biopsy on 1/9/2025 pathology report pending.  ID, heme-onc on board.    Discussed with the family at bedside, did readjustment and pain medication anticipate discharge tomorrow if the patient tolerates diet better.    Mouth pain  Reports of mouth pain for the past week or 2 with poor oral intake  Patient was given NSAIDs, steroids and [place] prior to traveling here.  She does have mouth sores and ulcers.  Plan:  Patient continues to have poor oral intake due to mouth ulcers, a lot of erythema on the gums.  Started on viscous lidocaine- made it scheduled.  Scheduled Tylenol.  As needed opioids for moderate and severe pain  Anticipate discharge tomorrow if the patient increases oral intake.  Elevated troponin  84-year-old female patient from [facility] here with " multiple problems.  Elevated troponin likely secondary to A-fib RVR.  Troponins are elevated however flat.  Cardiology recommended noncardiac in nature most likely because of underlying infection.  Currently recommend outpatient follow-up and ambulatory event monitor.  Cardiology sign off    Cardiac arrhythmia  Reportedly had A-fib with RVR and outpatient PCP office earlier today  Currently normal sinus rhythm and rate controlled  Episode likely due to underlying infection and since patient is currently maintained normal sinus rhythm currently plan is not to anticoagulate.  Outpatient event monitor and outpatient follow-up with cardiology.  CKD (chronic kidney disease) stage 3, GFR 30-59 ml/min ([facility])  Lab Results   Component Value Date    EGFR 64 01/15/2025    EGFR 60 01/14/2025    EGFR 72 01/13/2025    CREATININE 0.83 01/15/2025    CREATININE 0.88 01/14/2025    CREATININE 0.76 01/13/2025   Currently creatinine stable.  Paroxysmal atrial fibrillation ([facility])  Reportedly had episode of A-fib with RVR with outpatient PCP visit however currently EKG noted with normal sinus rhythm.  Further care per cardiology recommendation.  Fever  Patient developed fever and hospitalization.  COVID-19 suspicious RSV negative.  UA negative for UTI.  2 sets of blood culture without growth in 5 days.  CT chest showed right lower lobe bronchial inflammation and no consolidation.  Patient had another episode of fever last night 101 Tmax.  ID team following.  Continue to monitor off antibiotics given patient improving.    Aortic regurgitation  Echocardiogram with EF of 52%, mild to moderate AR   Recommend outpatient follow-up with cardiology.    VTE Pharmacologic Prophylaxis: VTE Score: 6 High Risk (Score >/= 5) - Pharmacological DVT Prophylaxis Ordered: enoxaparin (Lovenox). Sequential Compression Devices Ordered.    Mobility:   Basic Mobility Inpatient Raw Score: 16  -[facility] Goal: 5: Stand one or more mins  -HL Achieved: 6: Walk 10 steps  or more  -HLM Goal achieved. Continue to encourage appropriate mobility.    Patient Centered Rounds: I performed bedside rounds with nursing staff today.   Discussions with Specialists or Other Care Team Provider: Discussed case with infectious disease and oncology team    Education and Discussions with Family / Patient: Updated  (edgardo) at bedside.    Current Length of Stay: 7 day(s)  Current Patient Status: Inpatient   Certification Statement: The patient will continue to require additional inpatient hospital stay due to monitor for the fever also patient decreased oral intake in the setting of pain  Discharge Plan: Anticipate discharge in 24-48 hrs to home.    Code Status: Level 1 - Full Code    Subjective   Patient not speaking English however per family patient complains of a lot of pain and burning of her lips.  Decreased oral intake due to mouth sores upper and lower gums.    Objective :  HR:  [76-80] 76  BP: (103-109)/(71-79) 109/79  Resp:  [16-17] 16  SpO2:  [95 %-96 %] 95 %  O2 Device: None (Room air)    Body mass index is 22.83 kg/m².     Input and Output Summary (last 24 hours):     Intake/Output Summary (Last 24 hours) at 1/15/2025 1524  Last data filed at 1/15/2025 1348  Gross per 24 hour   Intake 420 ml   Output --   Net 420 ml       Physical Exam  Vitals and nursing note reviewed.   Constitutional:       General: She is not in acute distress.     Appearance: She is well-developed. She is not ill-appearing.   HENT:      Head: Normocephalic and atraumatic.      Mouth/Throat:      Comments: Ulcers of the lower lip that are improving.  Poor denture  Eyes:      Conjunctiva/sclera: Conjunctivae normal.   Cardiovascular:      Rate and Rhythm: Normal rate and regular rhythm.      Heart sounds: No murmur heard.  Pulmonary:      Effort: Pulmonary effort is normal. No respiratory distress.      Breath sounds: Normal breath sounds. No wheezing or rales.   Abdominal:      Palpations: Abdomen  is soft.      Tenderness: There is no abdominal tenderness.   Musculoskeletal:         General: No swelling.      Cervical back: Neck supple.      Right lower leg: No edema.      Left lower leg: No edema.   Skin:     General: Skin is warm and dry.      Capillary Refill: Capillary refill takes less than 2 seconds.   Neurological:      Mental Status: She is alert.   Psychiatric:         Mood and Affect: Mood normal.         Lines/Drains:              Lab Results: I have reviewed the following results:   Results from last 7 days   Lab Units 01/15/25  1056 01/13/25  0445 01/12/25  0505   WBC Thousand/uL 6.43   < > 3.59*   HEMOGLOBIN g/dL 10.0*   < > 9.3*   HEMATOCRIT % 31.4*   < > 29.2*   PLATELETS Thousands/uL 874*   < > 203   BANDS PCT % 8  --   --    SEGS PCT %  --   --  34*   LYMPHO PCT % 37  --  41   MONO PCT % 10  --  16*   EOS PCT % 2  --  6    < > = values in this interval not displayed.     Results from last 7 days   Lab Units 01/15/25  1056   SODIUM mmol/L 137   POTASSIUM mmol/L 4.3   CHLORIDE mmol/L 103   CO2 mmol/L 28   BUN mg/dL 15   CREATININE mg/dL 0.83   ANION GAP mmol/L 6   CALCIUM mg/dL 8.6   GLUCOSE RANDOM mg/dL 107                 Results from last 7 days   Lab Units 01/14/25  0504 01/13/25  0445   PROCALCITONIN ng/ml 0.59* 1.21*       Recent Cultures (last 7 days):         Imaging Results Review: I reviewed radiology reports from this admission including: CT chest.  Other Study Results Review: No additional pertinent studies reviewed.    Last 24 Hours Medication List:     Current Facility-Administered Medications:   •  acetaminophen (TYLENOL) tablet 975 mg, Q6H AMAURY  •  albuterol (PROVENTIL HFA,VENTOLIN HFA) inhaler 2 puff, Q6H PRN  •  enoxaparin (LOVENOX) subcutaneous injection 40 mg, Q24H AMAURY  •  fluticasone (FLONASE) 50 mcg/act nasal spray 1 spray, Daily  •  folic acid (FOLVITE) tablet 1 mg, Daily  •  Lidocaine Viscous HCl (XYLOCAINE) 2 % mucosal solution 15 mL, 4x Daily (AC & HS)  •  oxyCODONE  (ROXICODONE) IR tablet 5 mg, Q6H PRN  •  oxyCODONE (ROXICODONE) split tablet 2.5 mg, Q6H PRN    Administrative Statements   Today, Patient Was Seen By: Elizabeth Ramirez MD      **Please Note: This note may have been constructed using a voice recognition system.**

## 2025-01-15 NOTE — ASSESSMENT & PLAN NOTE
84-year-old female patient from [facility] here with multiple problems.  Elevated troponin likely secondary to A-fib RVR.  Troponins are elevated however flat.  Cardiology recommended noncardiac in nature most likely because of underlying infection.  Currently recommend outpatient follow-up and ambulatory event monitor.  Cardiology sign off

## 2025-01-15 NOTE — ASSESSMENT & PLAN NOTE
"84[facility].  Elevated troponin likely secondary to A-fib RVR.  Noted pancytopenia, mouth ulcer, odynophagia, chest pain.  Difficult to obtain history since patient primarily speaks native language \"[place] \", patient's daughter speak patient language however daughter does not speak English however grandson is helping contribute to patient's daughter who is helping interpret with the patient.    Had received PLT transfusion on 1/8/2025.  Folate deficiency noted.  COVID/FLU/RSV  negative  HSV PCR in precess  CMV culture negative.  Chronic hepatitis negative.  HIV negative.  EBV IgG positive likely previous infection  [place]angue serology negative    Suspicion pancytopenia to be in the setting of viral given improvement in WBC as well as platelets, however not completely ruled out malignancy.  Leukemia/lymphoma mentioning neoplasm/infection.  Oncology team following. S/p bone marrow biopsy on 1/9/2025 pathology report pending.  ID, heme-onc on board.    Discussed with the family at bedside, did readjustment and pain medication anticipate discharge tomorrow if the patient tolerates diet better.    "

## 2025-01-15 NOTE — ASSESSMENT & PLAN NOTE
Reports of mouth pain for the past week or 2 with poor oral intake  Patient was given NSAIDs, steroids and [place] prior to traveling here.  She does have mouth sores and ulcers.  Plan:  Patient continues to have poor oral intake due to mouth ulcers, a lot of erythema on the gums.  Started on viscous lidocaine- made it scheduled.  Scheduled Tylenol.  As needed opioids for moderate and severe pain  Anticipate discharge tomorrow if the patient increases oral intake.

## 2025-01-15 NOTE — PLAN OF CARE
Problem: PAIN - ADULT  Goal: Verbalizes/displays adequate comfort level or baseline comfort level  Description: Interventions:  - Encourage patient to monitor pain and request assistance  - Assess pain using appropriate pain scale  - Administer analgesics based on type and severity of pain and evaluate response  - Implement non-pharmacological measures as appropriate and evaluate response  - Consider cultural and social influences on pain and pain management  - Notify physician/advanced practitioner if interventions unsuccessful or patient reports new pain  Outcome: Progressing     Problem: INFECTION - ADULT  Goal: Absence or prevention of progression during hospitalization  Description: INTERVENTIONS:  - Assess and monitor for signs and symptoms of infection  - Monitor lab/diagnostic results  - Monitor all insertion sites, i.e. indwelling lines, tubes, and drains  - Monitor endotracheal if appropriate and nasal secretions for changes in amount and color  - [place] appropriate cooling/warming therapies per order  - Administer medications as ordered  - Instruct and encourage patient and family to use good hand hygiene technique  - Identify and instruct in appropriate isolation precautions for identified infection/condition  Outcome: Progressing     Problem: SAFETY ADULT  Goal: Patient will remain free of falls  Description: INTERVENTIONS:  - Educate patient/family on patient safety including physical limitations  - Instruct patient to call for assistance with activity   - Consult OT/PT to assist with strengthening/mobility   - Keep Call bell within reach  - Keep bed low and locked with side rails adjusted as appropriate  - Keep care items and personal belongings within reach  - Initiate and maintain comfort rounds  - Make Fall Risk Sign visible to staff  - Offer Toileting every  Hours, in advance of need  - Initiate/Maintain alarm  - Obtain necessary fall risk management equipment:   - Apply yellow socks and  bracelet for high fall risk patients  - Consider moving patient to room near nurses station  Outcome: Progressing  Goal: Maintain or return to baseline ADL function  Description: INTERVENTIONS:  -  Assess patient's ability to carry out ADLs; assess patient's baseline for ADL function and identify physical deficits which impact ability to perform ADLs (bathing, care of mouth/teeth, toileting, grooming, dressing, etc.)  - Assess/evaluate cause of self-care deficits   - Assess range of motion  - Assess patient's mobility; develop plan if impaired  - Assess patient's need for assistive devices and provide as appropriate  - Encourage maximum independence but intervene and supervise when necessary  - Involve family in performance of ADLs  - Assess for home care needs following discharge   - Consider OT consult to assist with ADL evaluation and planning for discharge  - Provide patient education as appropriate  Outcome: Progressing  Goal: Maintains/Returns to pre admission functional level  Description: INTERVENTIONS:  - Perform AM-PAC 6 Click Basic Mobility/ Daily Activity assessment daily.  - Set and communicate daily mobility goal to care team and patient/family/caregiver.   - Collaborate with rehabilitation services on mobility goals if consulted  - Perform Range of Motion  times a day.  - Reposition patient every  hours.  - Dangle patient  times a day  - Stand patient  times a day  - Ambulate patient  times a day  - Out of bed to chair  times a day   - Out of bed for meals times a day  - Out of bed for toileting  - Record patient progress and toleration of activity level   Outcome: Progressing     Problem: DISCHARGE PLANNING  Goal: Discharge to home or other facility with appropriate resources  Description: INTERVENTIONS:  - Identify barriers to discharge w/patient and caregiver  - Arrange for needed discharge resources and transportation as appropriate  - Identify discharge learning needs (meds, wound care, etc.)  -  Arrange for interpretive services to assist at discharge as needed  - Refer to Case Management Department for coordinating discharge planning if the patient needs post-hospital services based on physician/advanced practitioner order or complex needs related to functional status, cognitive ability, or social support system  Outcome: Progressing     Problem: Knowledge Deficit  Goal: Patient/family/caregiver demonstrates understanding of disease process, treatment plan, medications, and discharge instructions  Description: Complete learning assessment and assess knowledge base.  Interventions:  - Provide teaching at level of understanding  - Provide teaching via preferred learning methods  Outcome: Progressing     Problem: Prexisting or High Potential for Compromised Skin Integrity  Goal: Skin integrity is maintained or improved  Description: INTERVENTIONS:  - Identify patients at risk for skin breakdown  - Assess and monitor skin integrity  - Assess and monitor nutrition and hydration status  - Monitor labs   - Assess for incontinence   - Turn and reposition patient  - Assist with mobility/ambulation  - Relieve pressure over bony prominences  - Avoid friction and shearing  - Provide appropriate hygiene as needed including keeping skin clean and dry  - Evaluate need for skin moisturizer/barrier cream  - Collaborate with interdisciplinary team   - Patient/family teaching  - Consider wound care consult   Outcome: Progressing     Problem: GASTROINTESTINAL - ADULT  Goal: Maintains adequate nutritional intake  Description: INTERVENTIONS:  - Monitor percentage of each meal consumed  - Identify factors contributing to decreased intake, treat as appropriate  - Assist with meals as needed  - Monitor I&O, weight, and lab values if indicated  - Obtain nutrition services referral as needed  Outcome: Progressing  Goal: Oral mucous membranes remain intact  Description: INTERVENTIONS  - Assess oral mucosa and hygiene practices  -  Implement preventative oral hygiene regimen  - Implement oral medicated treatments as ordered  - Initiate Nutrition services referral as needed  Outcome: Progressing     Problem: Nutrition/Hydration-ADULT  Goal: Nutrient/Hydration intake appropriate for improving, restoring or maintaining nutritional needs  Description: Monitor and assess patient's nutrition/hydration status for malnutrition. Collaborate with interdisciplinary team and initiate plan and interventions as ordered.  Monitor patient's weight and dietary intake as ordered or per policy. Utilize nutrition screening tool and intervene as necessary. Determine patient's food preferences and provide high-protein, high-caloric foods as appropriate.     INTERVENTIONS:  - Monitor oral intake, urinary output, labs, and treatment plans  - Assess nutrition and hydration status and recommend course of action  - Evaluate amount of meals eaten  - Assist patient with eating if necessary   - Allow adequate time for meals  - Recommend/ encourage appropriate diets, oral nutritional supplements, and vitamin/mineral supplements  - Order, calculate, and assess calorie counts as needed  - Recommend, monitor, and adjust tube feedings and TPN/PPN based on assessed needs  - Assess need for intravenous fluids  - Provide specific nutrition/hydration education as appropriate  - Include patient/family/caregiver in decisions related to nutrition  Outcome: Progressing

## 2025-01-15 NOTE — PLAN OF CARE
Problem: PAIN - ADULT  Goal: Verbalizes/displays adequate comfort level or baseline comfort level  Description: Interventions:  - Encourage patient to monitor pain and request assistance  - Assess pain using appropriate pain scale  - Administer analgesics based on type and severity of pain and evaluate response  - Implement non-pharmacological measures as appropriate and evaluate response  - Consider cultural and social influences on pain and pain management  - Notify physician/advanced practitioner if interventions unsuccessful or patient reports new pain  Outcome: Progressing     Problem: INFECTION - ADULT  Goal: Absence or prevention of progression during hospitalization  Description: INTERVENTIONS:  - Assess and monitor for signs and symptoms of infection  - Monitor lab/diagnostic results  - Monitor all insertion sites, i.e. indwelling lines, tubes, and drains  - Monitor endotracheal if appropriate and nasal secretions for changes in amount and color  - [place] appropriate cooling/warming therapies per order  - Administer medications as ordered  - Instruct and encourage patient and family to use good hand hygiene technique  - Identify and instruct in appropriate isolation precautions for identified infection/condition  Outcome: Progressing     Problem: SAFETY ADULT  Goal: Patient will remain free of falls  Description: INTERVENTIONS:  - Educate patient/family on patient safety including physical limitations  - Instruct patient to call for assistance with activity   - Consult OT/PT to assist with strengthening/mobility   - Keep Call bell within reach  - Keep bed low and locked with side rails adjusted as appropriate  - Keep care items and personal belongings within reach  - Initiate and maintain comfort rounds  - Make Fall Risk Sign visible to staff  - Offer Toileting every 2 Hours, in advance of need  - Initiate/Maintain bed alarm  - Obtain necessary fall risk management equipment: bed alarm non skid  footwear call bell at bed side   - Apply yellow socks and bracelet for high fall risk patients  - Consider moving patient to room near nurses station  Outcome: Progressing  Goal: Maintain or return to baseline ADL function  Description: INTERVENTIONS:  -  Assess patient's ability to carry out ADLs; assess patient's baseline for ADL function and identify physical deficits which impact ability to perform ADLs (bathing, care of mouth/teeth, toileting, grooming, dressing, etc.)  - Assess/evaluate cause of self-care deficits   - Assess range of motion  - Assess patient's mobility; develop plan if impaired  - Assess patient's need for assistive devices and provide as appropriate  - Encourage maximum independence but intervene and supervise when necessary  - Involve family in performance of ADLs  - Assess for home care needs following discharge   - Consider OT consult to assist with ADL evaluation and planning for discharge  - Provide patient education as appropriate  Outcome: Progressing  Goal: Maintains/Returns to pre admission functional level  Description: INTERVENTIONS:  - Perform AM-PAC 6 Click Basic Mobility/ Daily Activity assessment daily.  - Set and communicate daily mobility goal to care team and patient/family/caregiver.   - Collaborate with rehabilitation services on mobility goals if consulted  - Perform Range of Motion 3 times a day.  - Reposition patient every 3 hours.  - Dangle patient 3 times a day  - Stand patient 3 times a day  - Ambulate patient 3 times a day  - Out of bed to chair 3 times a day   - Out of bed for meals 3 times a day  - Out of bed for toileting  - Record patient progress and toleration of activity level   Outcome: Progressing     Problem: DISCHARGE PLANNING  Goal: Discharge to home or other facility with appropriate resources  Description: INTERVENTIONS:  - Identify barriers to discharge w/patient and caregiver  - Arrange for needed discharge resources and transportation as  appropriate  - Identify discharge learning needs (meds, wound care, etc.)  - Arrange for interpretive services to assist at discharge as needed  - Refer to Case Management Department for coordinating discharge planning if the patient needs post-hospital services based on physician/advanced practitioner order or complex needs related to functional status, cognitive ability, or social support system  Outcome: Progressing     Problem: Knowledge Deficit  Goal: Patient/family/caregiver demonstrates understanding of disease process, treatment plan, medications, and discharge instructions  Description: Complete learning assessment and assess knowledge base.  Interventions:  - Provide teaching at level of understanding  - Provide teaching via preferred learning methods  Outcome: Progressing     Problem: Prexisting or High Potential for Compromised Skin Integrity  Goal: Skin integrity is maintained or improved  Description: INTERVENTIONS:  - Identify patients at risk for skin breakdown  - Assess and monitor skin integrity  - Assess and monitor nutrition and hydration status  - Monitor labs   - Assess for incontinence   - Turn and reposition patient  - Assist with mobility/ambulation  - Relieve pressure over bony prominences  - Avoid friction and shearing  - Provide appropriate hygiene as needed including keeping skin clean and dry  - Evaluate need for skin moisturizer/barrier cream  - Collaborate with interdisciplinary team   - Patient/family teaching  - Consider wound care consult   Outcome: Progressing     Problem: GASTROINTESTINAL - ADULT  Goal: Maintains adequate nutritional intake  Description: INTERVENTIONS:  - Monitor percentage of each meal consumed  - Identify factors contributing to decreased intake, treat as appropriate  - Assist with meals as needed  - Monitor I&O, weight, and lab values if indicated  - Obtain nutrition services referral as needed  Outcome: Progressing  Goal: Oral mucous membranes remain  intact  Description: INTERVENTIONS  - Assess oral mucosa and hygiene practices  - Implement preventative oral hygiene regimen  - Implement oral medicated treatments as ordered  - Initiate Nutrition services referral as needed  Outcome: Progressing     Problem: Nutrition/Hydration-ADULT  Goal: Nutrient/Hydration intake appropriate for improving, restoring or maintaining nutritional needs  Description: Monitor and assess patient's nutrition/hydration status for malnutrition. Collaborate with interdisciplinary team and initiate plan and interventions as ordered.  Monitor patient's weight and dietary intake as ordered or per policy. Utilize nutrition screening tool and intervene as necessary. Determine patient's food preferences and provide high-protein, high-caloric foods as appropriate.     INTERVENTIONS:  - Monitor oral intake, urinary output, labs, and treatment plans  - Assess nutrition and hydration status and recommend course of action  - Evaluate amount of meals eaten  - Assist patient with eating if necessary   - Allow adequate time for meals  - Recommend/ encourage appropriate diets, oral nutritional supplements, and vitamin/mineral supplements  - Order, calculate, and assess calorie counts as needed  - Recommend, monitor, and adjust tube feedings and TPN/PPN based on assessed needs  - Assess need for intravenous fluids  - Provide specific nutrition/hydration education as appropriate  - Include patient/family/caregiver in decisions related to nutrition  Outcome: Progressing

## 2025-01-15 NOTE — ASSESSMENT & PLAN NOTE
Patient developed fever and hospitalization.  COVID-19 suspicious RSV negative.  UA negative for UTI.  2 sets of blood culture without growth in 5 days.  CT chest showed right lower lobe bronchial inflammation and no consolidation.  Patient had another episode of fever last night 101 Tmax.  ID team following.  Continue to monitor off antibiotics given patient improving.

## 2025-01-16 ENCOUNTER — TRANSITIONAL CARE MANAGEMENT (OUTPATIENT)
Dept: FAMILY MEDICINE CLINIC | Facility: CLINIC | Age: 85
End: 2025-01-16

## 2025-01-16 VITALS
HEART RATE: 57 BPM | TEMPERATURE: 98.3 F | BODY MASS INDEX: 22.71 KG/M2 | SYSTOLIC BLOOD PRESSURE: 106 MMHG | DIASTOLIC BLOOD PRESSURE: 54 MMHG | HEIGHT: 64 IN | WEIGHT: 133 LBS | RESPIRATION RATE: 18 BRPM | OXYGEN SATURATION: 95 %

## 2025-01-16 PROBLEM — L03.113 RIGHT ARM CELLULITIS: Status: ACTIVE | Noted: 2025-01-16

## 2025-01-16 PROCEDURE — 99239 HOSP IP/OBS DSCHRG MGMT >30: CPT

## 2025-01-16 RX ORDER — SULFAMETHOXAZOLE AND TRIMETHOPRIM 800; 160 MG/1; MG/1
1 TABLET ORAL EVERY 12 HOURS SCHEDULED
Qty: 14 TABLET | Refills: 0 | Status: SHIPPED | OUTPATIENT
Start: 2025-01-16 | End: 2025-01-23

## 2025-01-16 RX ORDER — SULFAMETHOXAZOLE AND TRIMETHOPRIM 800; 160 MG/1; MG/1
1 TABLET ORAL EVERY 12 HOURS SCHEDULED
Status: DISCONTINUED | OUTPATIENT
Start: 2025-01-16 | End: 2025-01-16 | Stop reason: HOSPADM

## 2025-01-16 RX ORDER — LIDOCAINE HYDROCHLORIDE 20 MG/ML
15 SOLUTION OROPHARYNGEAL 4 TIMES DAILY PRN
Qty: 100 ML | Refills: 0 | Status: SHIPPED | OUTPATIENT
Start: 2025-01-16 | End: 2025-01-26

## 2025-01-16 RX ORDER — FOLIC ACID 1 MG/1
1 TABLET ORAL DAILY
Qty: 30 TABLET | Refills: 0 | Status: SHIPPED | OUTPATIENT
Start: 2025-01-16 | End: 2025-02-15

## 2025-01-16 RX ADMIN — ACETAMINOPHEN 975 MG: 325 TABLET, FILM COATED ORAL at 12:36

## 2025-01-16 RX ADMIN — SULFAMETHOXAZOLE AND TRIMETHOPRIM 1 TABLET: 800; 160 TABLET ORAL at 12:36

## 2025-01-16 RX ADMIN — ACETAMINOPHEN 975 MG: 325 TABLET, FILM COATED ORAL at 06:20

## 2025-01-16 RX ADMIN — LIDOCAINE HYDROCHLORIDE 15 ML: 20 SOLUTION ORAL; TOPICAL at 08:19

## 2025-01-16 RX ADMIN — FLUTICASONE PROPIONATE 1 SPRAY: 50 SPRAY, METERED NASAL at 08:20

## 2025-01-16 RX ADMIN — ENOXAPARIN SODIUM 40 MG: 40 INJECTION SUBCUTANEOUS at 08:19

## 2025-01-16 RX ADMIN — FOLIC ACID 1 MG: 1 TABLET ORAL at 08:19

## 2025-01-16 NOTE — ASSESSMENT & PLAN NOTE
Patient complains of pain and itching in the right cubital area where the previous IV was.  There is some pus coming out at the IV site.  Patient continues to remain hemodynamically stable and no other systemic signs of infection.  Prescribed Bactrim twice a day for 7 days.

## 2025-01-16 NOTE — PLAN OF CARE
Problem: PAIN - ADULT  Goal: Verbalizes/displays adequate comfort level or baseline comfort level  Description: Interventions:  - Encourage patient to monitor pain and request assistance  - Assess pain using appropriate pain scale  - Administer analgesics based on type and severity of pain and evaluate response  - Implement non-pharmacological measures as appropriate and evaluate response  - Consider cultural and social influences on pain and pain management  - Notify physician/advanced practitioner if interventions unsuccessful or patient reports new pain  Outcome: Progressing     Problem: INFECTION - ADULT  Goal: Absence or prevention of progression during hospitalization  Description: INTERVENTIONS:  - Assess and monitor for signs and symptoms of infection  - Monitor lab/diagnostic results  - Monitor all insertion sites, i.e. indwelling lines, tubes, and drains  - Monitor endotracheal if appropriate and nasal secretions for changes in amount and color  - [place] appropriate cooling/warming therapies per order  - Administer medications as ordered  - Instruct and encourage patient and family to use good hand hygiene technique  - Identify and instruct in appropriate isolation precautions for identified infection/condition  Outcome: Progressing     Problem: SAFETY ADULT  Goal: Patient will remain free of falls  Description: INTERVENTIONS:  - Educate patient/family on patient safety including physical limitations  - Instruct patient to call for assistance with activity   - Consult OT/PT to assist with strengthening/mobility   - Keep Call bell within reach  - Keep bed low and locked with side rails adjusted as appropriate  - Keep care items and personal belongings within reach  - Initiate and maintain comfort rounds  - Make Fall Risk Sign visible to staff  - Offer Toileting every  Hours, in advance of need  - Initiate/Maintain alarm  - Obtain necessary fall risk management equipment:   - Apply yellow socks and  bracelet for high fall risk patients  - Consider moving patient to room near nurses station  Outcome: Progressing  Goal: Maintain or return to baseline ADL function  Description: INTERVENTIONS:  -  Assess patient's ability to carry out ADLs; assess patient's baseline for ADL function and identify physical deficits which impact ability to perform ADLs (bathing, care of mouth/teeth, toileting, grooming, dressing, etc.)  - Assess/evaluate cause of self-care deficits   - Assess range of motion  - Assess patient's mobility; develop plan if impaired  - Assess patient's need for assistive devices and provide as appropriate  - Encourage maximum independence but intervene and supervise when necessary  - Involve family in performance of ADLs  - Assess for home care needs following discharge   - Consider OT consult to assist with ADL evaluation and planning for discharge  - Provide patient education as appropriate  Outcome: Progressing  Goal: Maintains/Returns to pre admission functional level  Description: INTERVENTIONS:  - Perform AM-PAC 6 Click Basic Mobility/ Daily Activity assessment daily.  - Set and communicate daily mobility goal to care team and patient/family/caregiver.   - Collaborate with rehabilitation services on mobility goals if consulted  - Perform Range of Motion  times a day.  - Reposition patient every  hours.  - Dangle patient  times a day  - Stand patient  times a day  - Ambulate patient  times a day  - Out of bed to chair  times a day   - Out of bed for meals  times a day  - Out of bed for toileting  - Record patient progress and toleration of activity level   Outcome: Progressing     Problem: DISCHARGE PLANNING  Goal: Discharge to home or other facility with appropriate resources  Description: INTERVENTIONS:  - Identify barriers to discharge w/patient and caregiver  - Arrange for needed discharge resources and transportation as appropriate  - Identify discharge learning needs (meds, wound care, etc.)  -  Arrange for interpretive services to assist at discharge as needed  - Refer to Case Management Department for coordinating discharge planning if the patient needs post-hospital services based on physician/advanced practitioner order or complex needs related to functional status, cognitive ability, or social support system  Outcome: Progressing     Problem: Knowledge Deficit  Goal: Patient/family/caregiver demonstrates understanding of disease process, treatment plan, medications, and discharge instructions  Description: Complete learning assessment and assess knowledge base.  Interventions:  - Provide teaching at level of understanding  - Provide teaching via preferred learning methods  Outcome: Progressing     Problem: Prexisting or High Potential for Compromised Skin Integrity  Goal: Skin integrity is maintained or improved  Description: INTERVENTIONS:  - Identify patients at risk for skin breakdown  - Assess and monitor skin integrity  - Assess and monitor nutrition and hydration status  - Monitor labs   - Assess for incontinence   - Turn and reposition patient  - Assist with mobility/ambulation  - Relieve pressure over bony prominences  - Avoid friction and shearing  - Provide appropriate hygiene as needed including keeping skin clean and dry  - Evaluate need for skin moisturizer/barrier cream  - Collaborate with interdisciplinary team   - Patient/family teaching  - Consider wound care consult   Outcome: Progressing     Problem: GASTROINTESTINAL - ADULT  Goal: Maintains adequate nutritional intake  Description: INTERVENTIONS:  - Monitor percentage of each meal consumed  - Identify factors contributing to decreased intake, treat as appropriate  - Assist with meals as needed  - Monitor I&O, weight, and lab values if indicated  - Obtain nutrition services referral as needed  Outcome: Progressing  Goal: Oral mucous membranes remain intact  Description: INTERVENTIONS  - Assess oral mucosa and hygiene practices  -  Implement preventative oral hygiene regimen  - Implement oral medicated treatments as ordered  - Initiate Nutrition services referral as needed  Outcome: Progressing     Problem: Nutrition/Hydration-ADULT  Goal: Nutrient/Hydration intake appropriate for improving, restoring or maintaining nutritional needs  Description: Monitor and assess patient's nutrition/hydration status for malnutrition. Collaborate with interdisciplinary team and initiate plan and interventions as ordered.  Monitor patient's weight and dietary intake as ordered or per policy. Utilize nutrition screening tool and intervene as necessary. Determine patient's food preferences and provide high-protein, high-caloric foods as appropriate.     INTERVENTIONS:  - Monitor oral intake, urinary output, labs, and treatment plans  - Assess nutrition and hydration status and recommend course of action  - Evaluate amount of meals eaten  - Assist patient with eating if necessary   - Allow adequate time for meals  - Recommend/ encourage appropriate diets, oral nutritional supplements, and vitamin/mineral supplements  - Order, calculate, and assess calorie counts as needed  - Recommend, monitor, and adjust tube feedings and TPN/PPN based on assessed needs  - Assess need for intravenous fluids  - Provide specific nutrition/hydration education as appropriate  - Include patient/family/caregiver in decisions related to nutrition  Outcome: Progressing

## 2025-01-16 NOTE — DISCHARGE INSTR - AVS FIRST PAGE
Start taking Bactrim every 12 h for 7 days  Lidocaine viscous 4 times a day before meals and at bedtime  Continue to take tylenol every 8 hours  Please make sure to arrange a visit with the primary doctor to discuss the bone marrow biopsy results.  Patient can advance the diet as tolerated.  If tolerates solids can advance to solids.  Recommended follow-up with primary doctor to proceed with an MRI of the abdomen to check the pancreas.  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

## 2025-01-16 NOTE — ASSESSMENT & PLAN NOTE
Reports of mouth pain for the past week or 2 with poor oral intake  Patient was given NSAIDs, steroids and [place] prior to traveling here.  She does have mouth sores and ulcers.  Plan:  After adjustments in pain medication patient reports improvement in pain in the mouth.  Was recommended to continue with mouthwash 4 times a day before meals and at bedtime.  Recommended to continue with Tylenol.  Patient deemed stable for discharge.  Diet to be advanced as tolerated.

## 2025-01-16 NOTE — ASSESSMENT & PLAN NOTE
"84-year-old female patient from [facility] here with multiple problems.  Elevated troponin likely secondary to A-fib RVR.  Noted pancytopenia, mouth ulcer, odynophagia, chest pain.  Difficult to obtain history since patient primarily speaks native language \"[place] \", patient's daughter speak patient language however daughter does not speak English however grandson is helping contribute to patient's daughter who is helping interpret with the patient.    Had received PLT transfusion on 1/8/2025.  Folate deficiency noted.  COVID/FLU/RSV  negative  HSV PCR in precess  CMV culture negative.  Chronic hepatitis negative.  HIV negative.  EBV IgG positive likely previous infection  [place] serology negative    Suspicion pancytopenia to be in the setting of viral given improvement in WBC as well as platelets, however not completely ruled out malignancy.  Leukemia/lymphoma mentioning neoplasm/infection.  Oncology team following. S/p bone marrow biopsy on 1/9/2025 pathology report pending.  ID, heme-onc on board.    Patient with improvement in mouth pain.  Tolerated diet this morning.  Patient deemed stable for discharge.  Recommended follow-up with the primary care doctor.  Information was provided by case management to help establishing care with a primary doctor.    "

## 2025-01-16 NOTE — PLAN OF CARE
Problem: PAIN - ADULT  Goal: Verbalizes/displays adequate comfort level or baseline comfort level  Description: Interventions:  - Encourage patient to monitor pain and request assistance  - Assess pain using appropriate pain scale  - Administer analgesics based on type and severity of pain and evaluate response  - Implement non-pharmacological measures as appropriate and evaluate response  - Consider cultural and social influences on pain and pain management  - Notify physician/advanced practitioner if interventions unsuccessful or patient reports new pain  Outcome: Progressing     Problem: INFECTION - ADULT  Goal: Absence or prevention of progression during hospitalization  Description: INTERVENTIONS:  - Assess and monitor for signs and symptoms of infection  - Monitor lab/diagnostic results  - Monitor all insertion sites, i.e. indwelling lines, tubes, and drains  - Monitor endotracheal if appropriate and nasal secretions for changes in amount and color  - [place] appropriate cooling/warming therapies per order  - Administer medications as ordered  - Instruct and encourage patient and family to use good hand hygiene technique  - Identify and instruct in appropriate isolation precautions for identified infection/condition  Outcome: Progressing     Problem: SAFETY ADULT  Goal: Patient will remain free of falls  Description: INTERVENTIONS:  - Educate patient/family on patient safety including physical limitations  - Instruct patient to call for assistance with activity   - Consult OT/PT to assist with strengthening/mobility   - Keep Call bell within reach  - Keep bed low and locked with side rails adjusted as appropriate  - Keep care items and personal belongings within reach  - Initiate and maintain comfort rounds  - Make Fall Risk Sign visible to staff  - Offer Toileting every 2 Hours, in advance of need  - Initiate/Maintain bed alarm  - Apply yellow socks and bracelet for high fall risk patients  - Consider  moving patient to room near nurses station  Outcome: Progressing  Goal: Maintain or return to baseline ADL function  Description: INTERVENTIONS:  -  Assess patient's ability to carry out ADLs; assess patient's baseline for ADL function and identify physical deficits which impact ability to perform ADLs (bathing, care of mouth/teeth, toileting, grooming, dressing, etc.)  - Assess/evaluate cause of self-care deficits   - Assess range of motion  - Assess patient's mobility; develop plan if impaired  - Assess patient's need for assistive devices and provide as appropriate  - Encourage maximum independence but intervene and supervise when necessary  - Involve family in performance of ADLs  - Assess for home care needs following discharge   - Consider OT consult to assist with ADL evaluation and planning for discharge  - Provide patient education as appropriate  Outcome: Progressing  Goal: Maintains/Returns to pre admission functional level  Description: INTERVENTIONS:  - Perform AM-PAC 6 Click Basic Mobility/ Daily Activity assessment daily.  - Set and communicate daily mobility goal to care team and patient/family/caregiver.   - Collaborate with rehabilitation services on mobility goals if consulted  - Perform Range of Motion 2 times a day.  - Reposition patient every 2 hours.  - Dangle patient 2 times a day  - Stand patient 2 times a day  - Ambulate patient 2 times a day  - Out of bed to chair 2 times a day   - Out of bed for meals 2 times a day  - Out of bed for toileting  - Record patient progress and toleration of activity level   Outcome: Progressing     Problem: DISCHARGE PLANNING  Goal: Discharge to home or other facility with appropriate resources  Description: INTERVENTIONS:  - Identify barriers to discharge w/patient and caregiver  - Arrange for needed discharge resources and transportation as appropriate  - Identify discharge learning needs (meds, wound care, etc.)  - Arrange for interpretive services to  assist at discharge as needed  - Refer to Case Management Department for coordinating discharge planning if the patient needs post-hospital services based on physician/advanced practitioner order or complex needs related to functional status, cognitive ability, or social support system  Outcome: Progressing     Problem: Knowledge Deficit  Goal: Patient/family/caregiver demonstrates understanding of disease process, treatment plan, medications, and discharge instructions  Description: Complete learning assessment and assess knowledge base.  Interventions:  - Provide teaching at level of understanding  - Provide teaching via preferred learning methods  Outcome: Progressing     Problem: Knowledge Deficit  Goal: Patient/family/caregiver demonstrates understanding of disease process, treatment plan, medications, and discharge instructions  Description: Complete learning assessment and assess knowledge base.  Interventions:  - Provide teaching at level of understanding  - Provide teaching via preferred learning methods  Outcome: Progressing

## 2025-01-16 NOTE — ASSESSMENT & PLAN NOTE
84-year-old female patient from [facility] here with multiple problems.  Elevated troponin likely secondary to A-fib RVR.  Troponins are elevated however flat.  Cardiology recommended noncardiac in nature most likely because of underlying infection.  Currently recommend outpatient follow-up and ambulatory event monitor.  Cardiology sign off

## 2025-01-16 NOTE — CASE MANAGEMENT
Case Management Assessment & Discharge Planning Note    Patient name Bart Jasso  Location 2 [facility] 272/2 E 272-01 MRN [id number]  : 1940 Date 2025       Current Admission Date: 2025  Current Admission Diagnosis:Pancytopenia (HCC)   Patient Active Problem List    Diagnosis Date Noted Date Diagnosed    Right arm cellulitis 2025     Fever 2025     Aortic regurgitation 2025     Pancytopenia (HCC) 2025     CKD (chronic kidney disease) stage 3, GFR 30-59 ml/min (HCC) 2025     Paroxysmal atrial fibrillation (HCC) 2025     Elevated troponin 2025     Cardiac arrhythmia 2025     Mouth pain 2025     Chronic pain of both knees 2023       LOS (days): 8  Geometric Mean LOS (GMLOS) (days): 3.4  Days to GMLOS:-4.6     OBJECTIVE:    Risk of Unplanned Readmission Score: 7.02         Current admission status: Inpatient       Preferred Pharmacy:   [facility]pharm[facility][id number] - [facility] [address] [address]  Phone: [phone] Fax: [phone]    Primary Care Provider: MAKAYLA Chapman    Primary Insurance: NOLAN SAMSON PENDING  Secondary Insurance:     ASSESSMENT:  Active Health Care Proxies    There are no active Health Care Proxies on file.         DISCHARGE DETAILS:    Discharge planning discussed with:: Patient's dtr and grandson Maxime  Freedom of Choice: Yes  Comments - Freedom of Choice: Patient  is cleared for d/c today per rounds with SL[facility].  MADY ,through grandson interpretation ,shared information on [facility] f/u for medical care, in addition to social and financial support with Medicaid application.  The family intends to provide 24/7 care until the patient can return to [facility].  Family will provide transport to all appointments,  CM also gave daughter the [facility] Find Help Card in [place]  CM contacted family/caregiver?: Yes  Were Treatment Team discharge recommendations  reviewed with patient/caregiver?: Yes  Did patient/caregiver verbalize understanding of patient care needs?: Yes  Were patient/caregiver advised of the risks associated with not following Treatment Team discharge recommendations?: Yes    Contacts  Patient Contacts: Maxime Collier/Liz  Relationship to Patient:: Family  Contact Method: Phone  Phone Number: [phone]  Reason/Outcome: Emergency Contact    Requested Home Health Care         Is the patient interested in HHC at discharge?: No    DME Referral Provided  Referral made for DME?: No    Other Referral/Resources/Interventions Provided:  Interventions: FindHelp  Referral Comments: CM met with the family to discuss OP support at [facility]- gave them a brochure and Find Help card.  jeremias Maxime plans to call to schedule appointment for the patient.  Government Services:: Medical Assistance    Would you like to participate in our [facility] service program?  : No - Declined    Treatment Team Recommendation: Home  Discharge Destination Plan:: Home  Transport at Discharge : Family                             IMM Given (Date):: 01/16/25 (CM reviwed IMM with  sheng via phone . he verbalized understanding with no questions.  CM took a verbal to sign original , which was placed in MR and dtr was given a copy.)  IMM Given to:: Family

## 2025-01-16 NOTE — DISCHARGE SUMMARY
"Discharge Summary - Hospitalist   Name: Bart Jasso 84 y.o. female I MRN: [id number]  Unit/Bed#: 2 E 272-01 I Date of Admission: 1/7/2025   Date of Service: 1/16/2025 I Hospital Day: 8     Assessment & Plan  Pancytopenia (HCC)  84-year-old female patient from [facility] here with multiple problems.  Elevated troponin likely secondary to A-fib RVR.  Noted pancytopenia, mouth ulcer, odynophagia, chest pain.  Difficult to obtain history since patient primarily speaks native language \"[place] \", patient's daughter speak patient language however daughter does not speak English however grandson is helping contribute to patient's daughter who is helping interpret with the patient.    Had received PLT transfusion on 1/8/2025.  Folate deficiency noted.  COVID/FLU/RSV  negative  HSV PCR in precess  CMV culture negative.  Chronic hepatitis negative.  HIV negative.  EBV IgG positive likely previous infection  Dangue serology negative    Suspicion pancytopenia to be in the setting of viral given improvement in WBC as well as platelets, however not completely ruled out malignancy.  Leukemia/lymphoma mentioning neoplasm/infection.  Oncology team following. S/p bone marrow biopsy on 1/9/2025 pathology report pending.  ID, heme-onc on board.    Patient with improvement in mouth pain.  Tolerated diet this morning.  Patient deemed stable for discharge.  Recommended follow-up with the primary care doctor.  Information was provided by case management to help establishing care with a primary doctor.    Mouth pain  Reports of mouth pain for the past week or 2 with poor oral intake  Patient was given NSAIDs, steroids and [place] prior to traveling here.  She does have mouth sores and ulcers.  Plan:  After adjustments in pain medication patient reports improvement in pain in the mouth.  Was recommended to continue with mouthwash 4 times a day before meals and at bedtime.  Recommended to continue with Tylenol.  Patient deemed stable " for discharge.  Diet to be advanced as tolerated.  Elevated troponin  84-year-old female patient from [facility] here with multiple problems.  Elevated troponin likely secondary to A-fib RVR.  Troponins are elevated however flat.  Cardiology recommended noncardiac in nature most likely because of underlying infection.  Currently recommend outpatient follow-up and ambulatory event monitor.  Cardiology sign off    Cardiac arrhythmia  Reportedly had A-fib with RVR and outpatient PCP office earlier today  Currently normal sinus rhythm and rate controlled  Episode likely due to underlying infection and since patient is currently maintained normal sinus rhythm currently plan is not to anticoagulate.  Outpatient event monitor and outpatient follow-up with cardiology.  CKD (chronic kidney disease) stage 3, GFR 30-59 ml/min ([facility])  Lab Results   Component Value Date    EGFR 64 01/15/2025    EGFR 60 01/14/2025    EGFR 72 01/13/2025    CREATININE 0.83 01/15/2025    CREATININE 0.88 01/14/2025    CREATININE 0.76 01/13/2025   Currently creatinine stable.  Paroxysmal atrial fibrillation (HCC)  Reportedly had episode of A-fib with RVR with outpatient PCP visit however currently EKG noted with normal sinus rhythm.  Further care per cardiology recommendation.  Fever  Patient developed fever and hospitalization.  COVID-19 suspicious RSV negative.  UA negative for UTI.  2 sets of blood culture without growth in 5 days.  CT chest showed right lower lobe bronchial inflammation and no consolidation.  Patient had another episode of fever last night 101 Tmax.  ID team following.  Continue to monitor off antibiotics given patient improving.    Aortic regurgitation  Echocardiogram with EF of 52%, mild to moderate AR   Recommend outpatient follow-up with cardiology.  Right arm cellulitis  Patient complains of pain and itching in the right cubital area where the previous IV was.  There is some pus coming out at the IV site.  Patient continues to  remain hemodynamically stable and no other systemic signs of infection.  Prescribed Bactrim twice a day for 7 days.       Medical Problems       Resolved Problems  Date Reviewed: 1/7/2025   None       Discharging Physician / Practitioner: Elizabeth Ramirez MD  PCP: MAKAYLA Chapman  Admission Date:   Admission Orders (From admission, onward)       Ordered        01/08/25 1500  INPATIENT ADMISSION  Once            01/07/25 1543  Place in Observation  Once                          Discharge Date: 01/16/25    Consultations During Hospital Stay:  Hematology oncology and infectious disease    Procedures Performed:   Bone marrow biopsy    Significant Findings / Test Results:   IR biopsy bone marrow   Final Result by Vince Manley MD (01/10 1052)   Impression:      Successful image guided bone marrow aspiration and core biopsy.      Signed, performed, and dictated by MAKAYLA Espinosa under the supervision of Dr. Vince Manley         Workstation performed: [id number]         CT abdomen pelvis wo contrast   Final Result by Gio Beard MD (01/09 0945)      No splenomegaly or lymphadenopathy.      Pancreatic neck/body cyst measuring 2.5 cm. Given its size, outpatient MRI/MRCP abdomen with and without contrast is recommended.      The study was marked in EPIC for immediate notification.      Workstation performed: [id number]         CTA chest pe study   Final Result by Samuel Renteria MD (01/07 1631)         1. Right lower lobe bronchial inflammation. No airspace consolidation.   2. No evidence of acute pulmonary embolus.                  Workstation performed: [id number]         XR chest pa and lateral   Final Result by Lane Uriarte MD (01/07 9249)      No acute cardiopulmonary disease.            Workstation performed: [id number]              Incidental Findings:   CT finding showing 2.5 cm pancreatic cyst, recommending MRI with MRCP in the outpatient setting  I reviewed the above mentioned  "incidental findings with the patient and/or family and they expressed understanding.    Test Results Pending at Discharge (will require follow up):   Bone marrow biopsy     Outpatient Tests Requested:  none    Complications:  none    Reason for Admission: Mouth pain due to ulcers    Hospital Course:   Bart Jasso is a 84 y.o. female patient who originally presented to the hospital on 1/7/2025 due to mouth pain for the past few days as well as poor oral intake in the setting of pain.  During hospital stay workup revealed pancytopenia.  Infectious disease was on board when patient started to develop fever.  Given patient from [facility] broad infectious workup was done however all the test came back negative.  All her cell counts were slowly improving without any intervention.  The ulceration in the mouth was slowly improving.  Hematology oncology was also involved and recommended bone marrow biopsy.  Patient underwent the procedure however the results are still pending.  Patient was also noted to have cardiac arrhythmia at the PCP office was concern for A-fib with RVR.  On admission patient was in sinus rhythm cardiology was involved and thought to be in the setting of infection.  Was recommended outpatient follow-up with cardiology.          Please see above list of diagnoses and related plan for additional information.     Condition at Discharge: good    Discharge Day Visit / Exam:   Subjective: Patient does not speak English however the family  Vitals: Blood Pressure: 106/54 (01/16/25 1341)  Pulse: 57 (01/16/25 0808)  Temperature: 98.3 °F (36.8 °C) (01/14/25 1516)  Temp Source: Axillary (01/14/25 1516)  Respirations: 18 (01/16/25 0808)  Height: 5' 4\" (162.6 cm) (01/08/25 1439)  Weight - Scale: 60.3 kg (133 lb) (01/08/25 1439)  SpO2: 95 % (01/16/25 0808)  Physical Exam  Vitals and nursing note reviewed.   Constitutional:       General: She is not in acute distress.     Appearance: She is well-developed. She " is not ill-appearing.   HENT:      Head: Normocephalic and atraumatic.      Mouth/Throat:      Comments: Ulceration in the mouth with improvement.  Eyes:      Conjunctiva/sclera: Conjunctivae normal.   Cardiovascular:      Rate and Rhythm: Normal rate and regular rhythm.      Heart sounds: No murmur heard.  Pulmonary:      Effort: Pulmonary effort is normal. No respiratory distress.      Breath sounds: Normal breath sounds. No wheezing or rales.   Abdominal:      Palpations: Abdomen is soft.      Tenderness: There is no abdominal tenderness. There is no right CVA tenderness or left CVA tenderness.   Musculoskeletal:         General: No swelling.      Cervical back: Neck supple.      Right lower leg: No edema.      Left lower leg: No edema.   Skin:     General: Skin is warm and dry.      Capillary Refill: Capillary refill takes less than 2 seconds.      Findings: Erythema and lesion (Right antecubital area) present.   Neurological:      Mental Status: She is alert. Mental status is at baseline.   Psychiatric:         Mood and Affect: Mood normal.          Discussion with Family: Updated  (granddaughter) at bedside.    Discharge instructions/Information to patient and family:   See after visit summary for information provided to patient and family.      Provisions for Follow-Up Care:  See after visit summary for information related to follow-up care and any pertinent home health orders.      Mobility at time of Discharge:   Basic Mobility Inpatient Raw Score: 17  JH-HLM Goal: 5: Stand one or more mins  JH-HLM Achieved: 6: Walk 10 steps or more  HLM Goal achieved. Continue to encourage appropriate mobility.     Disposition:   Home    Planned Readmission: none    Discharge Medications:  See after visit summary for reconciled discharge medications provided to patient and/or family.      Administrative Statements   Discharge Statement:  I have spent a total time of 55 minutes in caring for this patient on  the day of the visit/encounter. >30 minutes of time was spent on: Diagnostic results, Prognosis, Risks and benefits of tx options, Instructions for management, Patient and family education, Importance of tx compliance, Risk factor reductions, Impressions, Counseling / Coordination of care, Documenting in the medical record, and Reviewing / ordering tests, medicine, procedures  .    **Please Note: This note may have been constructed using a voice recognition system**

## 2025-01-17 LAB
CMV SPEC QL CULT: NORMAL
EOSINOPHIL # BLD AUTO: 0.23 THOUSAND/UL (ref 0–0.61)
EOSINOPHIL NFR BLD MANUAL: 12 % (ref 0–6)
LYMPHOCYTES # BLD AUTO: 1.29 THOUSAND/UL (ref 0.6–4.47)
LYMPHOCYTES # BLD AUTO: 68 %
MONOCYTES # BLD AUTO: 0.04 THOUSAND/UL (ref 0–1.22)
MONOCYTES NFR BLD AUTO: 2 % (ref 4–12)
NEUTS BAND NFR BLD MANUAL: 1 % (ref 0–8)
NEUTS SEG # BLD: 0.34 THOUSAND/UL (ref 1.81–6.82)
NEUTS SEG NFR BLD AUTO: 17 %
PATHOLOGY REVIEW: YES
TOTAL CELLS COUNTED SPEC: 100

## 2025-01-20 LAB
Lab: NORMAL
Lab: NORMAL
MISCELLANEOUS LAB TEST RESULT: NORMAL
SCAN RESULT: NORMAL

## 2025-01-23 LAB
ATRIAL RATE: 74 BPM
Lab: NORMAL
Lab: NORMAL
MISCELLANEOUS LAB TEST RESULT: NORMAL
P AXIS: 61 DEGREES
PR INTERVAL: 166 MS
QRS AXIS: -39 DEGREES
QRSD INTERVAL: 80 MS
QT INTERVAL: 344 MS
QTC INTERVAL: 381 MS
T WAVE AXIS: 61 DEGREES
VENTRICULAR RATE: 74 BPM

## 2025-01-28 ENCOUNTER — OFFICE VISIT (OUTPATIENT)
Dept: FAMILY MEDICINE CLINIC | Facility: CLINIC | Age: 85
End: 2025-01-28

## 2025-01-28 VITALS
HEIGHT: 64 IN | HEART RATE: 54 BPM | BODY MASS INDEX: 22.36 KG/M2 | WEIGHT: 131 LBS | OXYGEN SATURATION: 95 % | SYSTOLIC BLOOD PRESSURE: 116 MMHG | DIASTOLIC BLOOD PRESSURE: 70 MMHG

## 2025-01-28 DIAGNOSIS — K86.2 PANCREATIC CYST: ICD-10-CM

## 2025-01-28 DIAGNOSIS — K13.79 MOUTH SORES: Primary | ICD-10-CM

## 2025-01-28 PROCEDURE — 99495 TRANSJ CARE MGMT MOD F2F 14D: CPT

## 2025-01-28 RX ORDER — NYSTATIN 100000 [USP'U]/ML
SUSPENSION ORAL
COMMUNITY
Start: 2024-12-31

## 2025-01-28 RX ORDER — TRIAMCINOLONE ACETONIDE 0.1 %
1 PASTE (GRAM) DENTAL 2 TIMES DAILY
Qty: 10 G | Refills: 3 | Status: SHIPPED | OUTPATIENT
Start: 2025-01-28

## 2025-01-28 NOTE — PROGRESS NOTES
Transition of Care Visit  Name: Bart Jasso      : 1940      MRN: [id number]  Encounter Provider: MAKAYLA Chapman  Encounter Date: 2025   Encounter department: [facility] [address]    Assessment & Plan  Mouth sores  Continue liquid/soft diet, triamcilone for sore part, follow up in 1 month or sooner.   Orders:  •  triamcinolone (KENALOG) 0.1 % oral topical paste; Apply 1 Application topically 2 (two) times a day    Pancreatic cyst  Reviewed imaging will order additional imaging and refer to endo for no  Orders:  •  Ambulatory Referral to Endocrinology; Future  •  MRI abdomen w wo contrast and mrcp; Future         History of Present Illness     Transitional Care Management Review:   Bart Jasso is a 84 y.o. female here for TCM follow up.     During the TCM phone call patient stated:  TCM Call     Date and time call was made  2025  2:59 PM    Hospital care reviewed  Records reviewed    Patient was hospitialized at  [facility]    Date of Admission  25    Date of discharge  25    Diagnosis  Pancytopenia    Disposition  Home    Were the patients medications reviewed and updated  Yes      TCM Call     I have advised the patient to call PCP with any new or worsening symptoms  NATALIA Ward        Here for TCM, she was admitted - for mouth sores abnormal blood counts and afib. Follow up with hem onc tomorrow and cardiology on 2/10. She is not able to eat because she cannot but on dentures. She had driness and burning in the mouth.       Review of Systems   Constitutional:  Negative for chills and fever.   HENT:  Positive for dental problem. Negative for ear pain and sore throat.    Eyes:  Negative for pain and visual disturbance.   Respiratory:  Negative for cough and shortness of breath.    Cardiovascular:  Negative for chest pain and palpitations.   Gastrointestinal:  Negative for abdominal pain and  "vomiting.   Genitourinary:  Negative for dysuria and hematuria.   Musculoskeletal:  Negative for arthralgias and back pain.   Skin:  Negative for color change and rash.   Neurological:  Positive for dizziness. Negative for seizures and syncope.   All other systems reviewed and are negative.    Objective   /70 (BP Location: Left arm, Patient Position: Sitting, Cuff Size: Standard)   Pulse (!) 54   Ht 5' 4\" (1.626 m)   Wt 59.4 kg (131 lb)   SpO2 95%   BMI 22.49 kg/m²     Physical Exam  Vitals and nursing note reviewed.   Constitutional:       General: She is not in acute distress.     Appearance: Normal appearance. She is well-developed. She is not ill-appearing.   HENT:      Head: Normocephalic and atraumatic.      Right Ear: Tympanic membrane, ear canal and external ear normal. There is no impacted cerumen.      Left Ear: Tympanic membrane, ear canal and external ear normal. There is no impacted cerumen.      Nose: Nose normal. No congestion.      Mouth/Throat:      Mouth: Mucous membranes are moist.      Pharynx: No posterior oropharyngeal erythema.   Eyes:      Extraocular Movements: Extraocular movements intact.      Conjunctiva/sclera: Conjunctivae normal.      Pupils: Pupils are equal, round, and reactive to light.   Cardiovascular:      Rate and Rhythm: Normal rate and regular rhythm.      Heart sounds: No murmur heard.  Pulmonary:      Effort: Pulmonary effort is normal. No respiratory distress.      Breath sounds: Normal breath sounds.   Abdominal:      Palpations: Abdomen is soft.      Tenderness: There is no abdominal tenderness.   Musculoskeletal:         General: No swelling.      Cervical back: Normal range of motion and neck supple.      Right lower leg: No edema.      Left lower leg: No edema.   Lymphadenopathy:      Cervical: No cervical adenopathy.   Skin:     General: Skin is warm and dry.      Capillary Refill: Capillary refill takes less than 2 seconds.   Neurological:      General: No " focal deficit present.      Mental Status: She is alert.   Psychiatric:         Mood and Affect: Mood normal.         Behavior: Behavior normal.       Medications have been reviewed by provider in current encounter

## 2025-01-29 ENCOUNTER — TELEPHONE (OUTPATIENT)
Dept: HEMATOLOGY ONCOLOGY | Facility: CLINIC | Age: 85
End: 2025-01-29

## 2025-01-29 NOTE — TELEPHONE ENCOUNTER
Attempted to call grandson to give address of pts upcoming appt w PEREZ- mailbox full, cannot accept any voicemails at this time

## 2025-01-30 ENCOUNTER — TELEPHONE (OUTPATIENT)
Age: 85
End: 2025-01-30

## 2025-01-30 DIAGNOSIS — K86.2 PANCREATIC CYST: Primary | ICD-10-CM

## 2025-01-30 NOTE — TELEPHONE ENCOUNTER
"Endocrinology referral reviewed by Dr. Franco Guzman.     Provider states:   \"if no concerns for blood sugar problems as a result of a pancreatic lesion or other endocrine portion of the pancreas problems like hormone hypersecretion, this is usually evaluated by GI     the referring provider can reach out to me if there is additional clinical info or questions but this seems like a GI referral\"    If any further questions please reach out to Dr. Franco Guzman directly via Epic Secure chat.   "

## 2025-02-08 PROBLEM — R50.9 FEVER: Status: RESOLVED | Noted: 2025-01-09 | Resolved: 2025-02-08

## 2025-02-09 PROBLEM — D46.9 MDS (MYELODYSPLASTIC SYNDROME) (HCC): Status: ACTIVE | Noted: 2025-02-09

## 2025-02-10 ENCOUNTER — TELEPHONE (OUTPATIENT)
Dept: HEMATOLOGY ONCOLOGY | Facility: CLINIC | Age: 85
End: 2025-02-10

## 2025-02-10 NOTE — TELEPHONE ENCOUNTER
Left message to inform patient of NO SHOW appointment. Informed patient that if they would like to reschedule, they can call the office to reschedule.

## 2025-03-24 ENCOUNTER — OFFICE VISIT (OUTPATIENT)
Dept: FAMILY MEDICINE CLINIC | Facility: CLINIC | Age: 85
End: 2025-03-24

## 2025-03-24 VITALS
DIASTOLIC BLOOD PRESSURE: 76 MMHG | SYSTOLIC BLOOD PRESSURE: 118 MMHG | BODY MASS INDEX: 23.61 KG/M2 | HEART RATE: 74 BPM | OXYGEN SATURATION: 96 % | TEMPERATURE: 98.1 F | WEIGHT: 138.3 LBS | HEIGHT: 64 IN

## 2025-03-24 DIAGNOSIS — J06.9 UPPER RESPIRATORY TRACT INFECTION, UNSPECIFIED TYPE: Primary | ICD-10-CM

## 2025-03-24 PROCEDURE — 99214 OFFICE O/P EST MOD 30 MIN: CPT | Performed by: FAMILY MEDICINE

## 2025-03-24 RX ORDER — ALBUTEROL SULFATE 90 UG/1
2 INHALANT RESPIRATORY (INHALATION) EVERY 6 HOURS PRN
Qty: 18 G | Refills: 0 | Status: SHIPPED | OUTPATIENT
Start: 2025-03-24

## 2025-03-24 RX ORDER — AZITHROMYCIN 250 MG/1
TABLET, FILM COATED ORAL
Qty: 6 TABLET | Refills: 0 | Status: SHIPPED | OUTPATIENT
Start: 2025-03-24 | End: 2025-03-28

## 2025-03-24 RX ORDER — AZELASTINE 1 MG/ML
1 SPRAY, METERED NASAL 2 TIMES DAILY
Qty: 30 ML | Refills: 3 | Status: SHIPPED | OUTPATIENT
Start: 2025-03-24

## 2025-03-24 NOTE — PROGRESS NOTES
Name: Bart Jasso      : 1940      MRN: [id number]  Encounter Provider: MAKAYLA May  Encounter Date: 3/24/2025   Encounter department: [facility] [address]  :  Assessment & Plan  Upper respiratory tract infection, unspecified type  Discussed care family  Increase oral hydration, warm tea with honey, increase nutrition   Adequate rest  Tylenol or Motrin for pain and/or fever  Nasal mist  Humidify room  Use decongestant- Mucinex  Zinc lozenges   Good handwashing  Orders:    azithromycin (ZITHROMAX) 250 mg tablet; Take 2 tablets today then 1 tablet daily x 4 days    albuterol (Ventolin HFA) 90 mcg/act inhaler; Inhale 2 puffs every 6 (six) hours as needed for wheezing    azelastine (ASTELIN) 0.1 % nasal spray; 1 spray into each nostril 2 (two) times a day Use in each nostril as directed           History of Present Illness   Complaining of cough past 2 weeks plus  Daughter assisting with history, patient non-English speaking  Negative negative fever chills shortness of breath difficulty breathing cough productive yellow/green in times, denies palpitations swelling to extremities  Previous uses of inhalers   Negative OTC medications  Ill contact grandson  Negative smoking        Review of Systems   Constitutional:  Negative for appetite change, chills, fever and unexpected weight change.   HENT:  Negative for congestion, dental problem, ear pain, hearing loss, postnasal drip, rhinorrhea, sinus pressure, sinus pain, sneezing, sore throat, tinnitus and voice change.    Eyes:  Negative for visual disturbance.   Respiratory:  Positive for cough. Negative for apnea, chest tightness and shortness of breath.    Cardiovascular:  Negative for chest pain, palpitations and leg swelling.   Gastrointestinal:  Negative for abdominal pain, blood in stool, constipation, diarrhea, nausea and vomiting.   Endocrine: Negative for cold intolerance, heat intolerance,  "polydipsia, polyphagia and polyuria.   Genitourinary:  Negative for decreased urine volume, difficulty urinating, dysuria, frequency and hematuria.   Musculoskeletal:  Negative for arthralgias, back pain, gait problem, joint swelling and myalgias.   Skin:  Negative for color change, rash and wound.   Allergic/Immunologic: Negative for environmental allergies and food allergies.   Neurological:  Negative for dizziness, syncope, weakness, light-headedness, numbness and headaches.   Hematological:  Negative for adenopathy. Does not bruise/bleed easily.   Psychiatric/Behavioral:  Negative for sleep disturbance and suicidal ideas. The patient is not nervous/anxious.        Objective   /76 (BP Location: Left arm, Patient Position: Sitting)   Pulse 74   Temp 98.1 °F (36.7 °C)   Ht 5' 4\" (1.626 m)   Wt 62.7 kg (138 lb 4.8 oz)   SpO2 96%   BMI 23.74 kg/m²      Physical Exam  Constitutional:       General: She is not in acute distress.     Appearance: She is well-developed. She is not ill-appearing or toxic-appearing.   HENT:      Head: Normocephalic and atraumatic.   Cardiovascular:      Rate and Rhythm: Normal rate and regular rhythm.      Heart sounds: Normal heart sounds.   Pulmonary:      Effort: Pulmonary effort is normal. No respiratory distress.      Breath sounds: Normal breath sounds. No wheezing or rales.   Musculoskeletal:         General: Normal range of motion.      Cervical back: Normal range of motion and neck supple.      Right lower leg: No edema.      Left lower leg: No edema.   Skin:     General: Skin is warm and dry.      Findings: No rash.   Neurological:      Mental Status: She is alert and oriented to person, place, and time.      Deep Tendon Reflexes: Reflexes are normal and symmetric.   Psychiatric:         Behavior: Behavior normal.         Thought Content: Thought content normal.         Judgment: Judgment normal.         "

## 2025-04-15 DIAGNOSIS — J06.9 UPPER RESPIRATORY TRACT INFECTION, UNSPECIFIED TYPE: ICD-10-CM

## 2025-04-16 RX ORDER — ALBUTEROL SULFATE 90 UG/1
2 INHALANT RESPIRATORY (INHALATION) EVERY 6 HOURS PRN
Qty: 18 G | Refills: 3 | Status: SHIPPED | OUTPATIENT
Start: 2025-04-16
